# Patient Record
Sex: FEMALE | Race: WHITE | NOT HISPANIC OR LATINO | Employment: PART TIME | URBAN - METROPOLITAN AREA
[De-identification: names, ages, dates, MRNs, and addresses within clinical notes are randomized per-mention and may not be internally consistent; named-entity substitution may affect disease eponyms.]

---

## 2017-11-26 ENCOUNTER — HOSPITAL ENCOUNTER (EMERGENCY)
Facility: HOSPITAL | Age: 57
Discharge: HOME | End: 2017-11-26
Attending: EMERGENCY MEDICINE | Admitting: EMERGENCY MEDICINE
Payer: COMMERCIAL

## 2017-11-26 ENCOUNTER — APPOINTMENT (EMERGENCY)
Dept: RADIOLOGY | Facility: HOSPITAL | Age: 57
End: 2017-11-26
Payer: COMMERCIAL

## 2017-11-26 VITALS
DIASTOLIC BLOOD PRESSURE: 68 MMHG | SYSTOLIC BLOOD PRESSURE: 100 MMHG | HEIGHT: 68 IN | RESPIRATION RATE: 21 BRPM | WEIGHT: 160 LBS | TEMPERATURE: 97.7 F | OXYGEN SATURATION: 98 % | HEART RATE: 86 BPM | BODY MASS INDEX: 24.25 KG/M2

## 2017-11-26 DIAGNOSIS — I47.1 PSVT (PAROXYSMAL SUPRAVENTRICULAR TACHYCARDIA) (HCC): Primary | ICD-10-CM

## 2017-11-26 LAB
ALBUMIN SERPL BCP-MCNC: 3.3 G/DL (ref 3.5–5)
ALP SERPL-CCNC: 83 U/L (ref 46–116)
ALT SERPL W P-5'-P-CCNC: 18 U/L (ref 12–78)
ANION GAP SERPL CALCULATED.3IONS-SCNC: 14 MMOL/L (ref 4–13)
AST SERPL W P-5'-P-CCNC: 12 U/L (ref 5–45)
ATRIAL RATE: 104 BPM
ATRIAL RATE: 79 BPM
BASOPHILS # BLD AUTO: 0.08 THOUSANDS/ΜL (ref 0–0.1)
BASOPHILS NFR BLD AUTO: 1 % (ref 0–1)
BILIRUB SERPL-MCNC: 0.3 MG/DL (ref 0.2–1)
BUN SERPL-MCNC: 17 MG/DL (ref 5–25)
CALCIUM SERPL-MCNC: 9.1 MG/DL (ref 8.3–10.1)
CHLORIDE SERPL-SCNC: 101 MMOL/L (ref 100–108)
CO2 SERPL-SCNC: 24 MMOL/L (ref 21–32)
CREAT SERPL-MCNC: 1.09 MG/DL (ref 0.6–1.3)
EOSINOPHIL # BLD AUTO: 0.13 THOUSAND/ΜL (ref 0–0.61)
EOSINOPHIL NFR BLD AUTO: 1 % (ref 0–6)
ERYTHROCYTE [DISTWIDTH] IN BLOOD BY AUTOMATED COUNT: 13.2 % (ref 11.6–15.1)
GFR SERPL CREATININE-BSD FRML MDRD: 57 ML/MIN/1.73SQ M
GLUCOSE SERPL-MCNC: 149 MG/DL (ref 65–140)
HCT VFR BLD AUTO: 47.1 % (ref 34.8–46.1)
HGB BLD-MCNC: 15.6 G/DL (ref 11.5–15.4)
LYMPHOCYTES # BLD AUTO: 3.73 THOUSANDS/ΜL (ref 0.6–4.47)
LYMPHOCYTES NFR BLD AUTO: 27 % (ref 14–44)
MAGNESIUM SERPL-MCNC: 2 MG/DL (ref 1.6–2.6)
MCH RBC QN AUTO: 28.3 PG (ref 26.8–34.3)
MCHC RBC AUTO-ENTMCNC: 33.1 G/DL (ref 31.4–37.4)
MCV RBC AUTO: 85 FL (ref 82–98)
MONOCYTES # BLD AUTO: 0.75 THOUSAND/ΜL (ref 0.17–1.22)
MONOCYTES NFR BLD AUTO: 5 % (ref 4–12)
NEUTROPHILS # BLD AUTO: 9.25 THOUSANDS/ΜL (ref 1.85–7.62)
NEUTS SEG NFR BLD AUTO: 66 % (ref 43–75)
NRBC BLD AUTO-RTO: 0 /100 WBCS
P AXIS: 60 DEGREES
PHOSPHATE SERPL-MCNC: 3 MG/DL (ref 2.7–4.5)
PLATELET # BLD AUTO: 328 THOUSANDS/UL (ref 149–390)
PMV BLD AUTO: 10.1 FL (ref 8.9–12.7)
POTASSIUM SERPL-SCNC: 4.2 MMOL/L (ref 3.5–5.3)
PR INTERVAL: 144 MS
PROT SERPL-MCNC: 7.3 G/DL (ref 6.4–8.2)
QRS AXIS: 57 DEGREES
QRS AXIS: 75 DEGREES
QRSD INTERVAL: 66 MS
QRSD INTERVAL: 68 MS
QT INTERVAL: 280 MS
QT INTERVAL: 338 MS
QTC INTERVAL: 444 MS
QTC INTERVAL: 448 MS
RBC # BLD AUTO: 5.52 MILLION/UL (ref 3.81–5.12)
SODIUM SERPL-SCNC: 139 MMOL/L (ref 136–145)
T WAVE AXIS: 10 DEGREES
T WAVE AXIS: 50 DEGREES
TROPONIN I SERPL-MCNC: 0.02 NG/ML
TSH SERPL DL<=0.05 MIU/L-ACNC: 1.83 UIU/ML (ref 0.36–3.74)
VENTRICULAR RATE: 104 BPM
VENTRICULAR RATE: 154 BPM
WBC # BLD AUTO: 13.99 THOUSAND/UL (ref 4.31–10.16)

## 2017-11-26 PROCEDURE — 96374 THER/PROPH/DIAG INJ IV PUSH: CPT

## 2017-11-26 PROCEDURE — 99285 EMERGENCY DEPT VISIT HI MDM: CPT

## 2017-11-26 PROCEDURE — 80053 COMPREHEN METABOLIC PANEL: CPT | Performed by: EMERGENCY MEDICINE

## 2017-11-26 PROCEDURE — 36415 COLL VENOUS BLD VENIPUNCTURE: CPT | Performed by: EMERGENCY MEDICINE

## 2017-11-26 PROCEDURE — 84484 ASSAY OF TROPONIN QUANT: CPT | Performed by: EMERGENCY MEDICINE

## 2017-11-26 PROCEDURE — 84443 ASSAY THYROID STIM HORMONE: CPT | Performed by: EMERGENCY MEDICINE

## 2017-11-26 PROCEDURE — 71010 HB CHEST X-RAY 1 VIEW FRONTAL (PORTABLE): CPT

## 2017-11-26 PROCEDURE — 85025 COMPLETE CBC W/AUTO DIFF WBC: CPT | Performed by: EMERGENCY MEDICINE

## 2017-11-26 PROCEDURE — 84100 ASSAY OF PHOSPHORUS: CPT | Performed by: EMERGENCY MEDICINE

## 2017-11-26 PROCEDURE — 83735 ASSAY OF MAGNESIUM: CPT | Performed by: EMERGENCY MEDICINE

## 2017-11-26 PROCEDURE — 93005 ELECTROCARDIOGRAM TRACING: CPT | Performed by: EMERGENCY MEDICINE

## 2017-11-26 RX ORDER — CLONAZEPAM 1 MG/1
0.5 TABLET ORAL 4 TIMES DAILY PRN
COMMUNITY

## 2017-11-26 RX ORDER — ADENOSINE 3 MG/ML
6 INJECTION INTRAVENOUS ONCE
Status: COMPLETED | OUTPATIENT
Start: 2017-11-26 | End: 2017-11-26

## 2017-11-26 RX ORDER — ADENOSINE 3 MG/ML
INJECTION, SOLUTION INTRAVENOUS
Status: COMPLETED
Start: 2017-11-26 | End: 2017-11-26

## 2017-11-26 RX ORDER — PAROXETINE 30 MG/1
60 TABLET, FILM COATED ORAL EVERY MORNING
COMMUNITY

## 2017-11-26 RX ORDER — ADENOSINE 3 MG/ML
12 INJECTION INTRAVENOUS ONCE
Status: COMPLETED | OUTPATIENT
Start: 2017-11-26 | End: 2017-11-26

## 2017-11-26 RX ADMIN — ADENOSINE 6 MG: 3 INJECTION, SOLUTION INTRAVENOUS at 10:13

## 2017-11-26 RX ADMIN — ADENOSINE 6 MG: 3 INJECTION INTRAVENOUS at 10:13

## 2017-11-26 RX ADMIN — ADENOSINE 12 MG: 3 INJECTION, SOLUTION INTRAVENOUS at 10:15

## 2017-11-26 NOTE — ED NOTES
SVT persists after 6mg adenosine  Administered 12mg adenosine   Dr crenshaw at bedside for administration     Pepe Mcbride RN  11/26/17 1016

## 2017-11-26 NOTE — ED PROVIDER NOTES
History  Chief Complaint   Patient presents with    Chest Pain     patient is c/o chest pain and palpitations  hx of SVT  Patient is a 72-year-old female  She has a history of PSVT  She has not had an episode about 20 years  Last time she was referred for ablation  She declined  Again she has been well for about 20 years  At 8:00 a m  she developed palpitations  She tried vagal maneuvers without relief  She is also experiencing a burning chest pain and some shortness of breath  Those symptoms are typical for her when she has these episodes of PSVT  She is not dizzy or near syncopal   She did not pass out  Symptoms have been constant  There been no relieving factors  Prior to Admission Medications   Prescriptions Last Dose Informant Patient Reported? Taking? PARoxetine (PAXIL) 30 mg tablet   Yes Yes   Sig: Take 60 mg by mouth every morning   clonazePAM (KlonoPIN) 1 mg tablet   Yes Yes   Sig: Take 0 5 mg by mouth 4 (four) times a day as needed      Facility-Administered Medications: None       Past Medical History:   Diagnosis Date    SVT (supraventricular tachycardia) (HCC)        Past Surgical History:   Procedure Laterality Date    JOINT REPLACEMENT      right hip       History reviewed  No pertinent family history  I have reviewed and agree with the history as documented  Social History   Substance Use Topics    Smoking status: Current Every Day Smoker     Packs/day: 1 00     Types: Cigarettes    Smokeless tobacco: Never Used    Alcohol use No        Review of Systems   Constitutional: Negative for chills and fever  HENT: Negative for rhinorrhea and sore throat  Eyes: Negative for pain, redness and visual disturbance  Respiratory: Positive for shortness of breath  Negative for cough  Cardiovascular: Positive for chest pain and palpitations  Negative for leg swelling  Gastrointestinal: Negative for abdominal pain, diarrhea and vomiting     Endocrine: Negative for polydipsia and polyuria  Genitourinary: Negative for dysuria, frequency, hematuria, vaginal bleeding and vaginal discharge  Musculoskeletal: Negative for back pain and neck pain  Skin: Negative for rash and wound  Allergic/Immunologic: Negative for immunocompromised state  Neurological: Negative for weakness, numbness and headaches  Hematological: Does not bruise/bleed easily  Psychiatric/Behavioral: Negative for hallucinations and suicidal ideas  All other systems reviewed and are negative  Physical Exam  ED Triage Vitals [11/26/17 1005]   Temperature Pulse Respirations Blood Pressure SpO2   97 7 °F (36 5 °C) (!) 165 16 158/76 98 %      Temp Source Heart Rate Source Patient Position - Orthostatic VS BP Location FiO2 (%)   Oral Monitor Sitting Right arm --      Pain Score       8           Orthostatic Vital Signs  Vitals:    11/26/17 1005 11/26/17 1015 11/26/17 1030 11/26/17 1100   BP: 158/76  115/70 100/68   Pulse: (!) 165 (!) 152 88 86   Patient Position - Orthostatic VS: Sitting  Lying        Physical Exam   Constitutional: She is oriented to person, place, and time  She appears well-developed and well-nourished  HENT:   Head: Normocephalic and atraumatic  Mouth/Throat: Oropharynx is clear and moist    Eyes: Conjunctivae are normal  Right eye exhibits no discharge  Left eye exhibits no discharge  No scleral icterus  Neck: Normal range of motion  Neck supple  Cardiovascular: Regular rhythm, normal heart sounds and intact distal pulses  Exam reveals no gallop and no friction rub  No murmur heard  Tachycardic rate  Pulmonary/Chest: Effort normal and breath sounds normal  No stridor  No respiratory distress  She has no wheezes  She has no rales  Abdominal: Soft  Bowel sounds are normal  She exhibits no distension  There is no tenderness  There is no rebound and no guarding  Musculoskeletal: Normal range of motion  She exhibits no edema, tenderness or deformity     No CVA tenderness  No calf tenderness/palpable cords  Neurological: She is alert and oriented to person, place, and time  She has normal strength  No sensory deficit  GCS eye subscore is 4  GCS verbal subscore is 5  GCS motor subscore is 6  Skin: Skin is warm and dry  No rash noted  Psychiatric: She has a normal mood and affect  Her behavior is normal    Vitals reviewed  ED Medications  Medications   adenosine (ADENOCARD) injection 6 mg (6 mg Intravenous Given 11/26/17 1013)   adenosine (ADENOCARD) injection 12 mg (12 mg Intravenous Given 11/26/17 1015)       Diagnostic Studies  Results Reviewed     Procedure Component Value Units Date/Time    Magnesium [[id number]]  (Normal) Collected:  11/26/17 1023    Lab Status:  Final result Specimen:  Blood from Arm, Right Updated:  11/26/17 1116     Magnesium 2 0 mg/dL     Phosphorus [[id number]]  (Normal) Collected:  11/26/17 1023    Lab Status:  Final result Specimen:  Blood from Arm, Right Updated:  11/26/17 1116     Phosphorus 3 0 mg/dL     TSH [[id number]]  (Normal) Collected:  11/26/17 1023    Lab Status:  Final result Specimen:  Blood from Arm, Right Updated:  11/26/17 1116     TSH 3RD GENERATON 1 827 uIU/mL     Narrative:         Patients undergoing fluorescein dye angiography may retain small amounts of fluorescein in the body for 48-72 hours post procedure  Samples containing fluorescein can produce falsely depressed TSH values  If the patient had this procedure,a specimen should be resubmitted post fluorescein clearance            The recommended reference ranges for TSH during pregnancy are as follows:  First trimester 0 1 to 2 5 uIU/mL  Second trimester  0 2 to 3 0 uIU/mL  Third trimester 0 3 to 3 0 uIU/m      Comprehensive metabolic panel [[id number]]  (Abnormal) Collected:  11/26/17 1023    Lab Status:  Final result Specimen:  Blood from Arm, Right Updated:  11/26/17 1109     Sodium 139 mmol/L      Potassium 4 2 mmol/L      Chloride 101 mmol/L      CO2 24 mmol/L Anion Gap 14 (H) mmol/L      BUN 17 mg/dL      Creatinine 1 09 mg/dL      Glucose 149 (H) mg/dL      Calcium 9 1 mg/dL      AST 12 U/L      ALT 18 U/L      Alkaline Phosphatase 83 U/L      Total Protein 7 3 g/dL      Albumin 3 3 (L) g/dL      Total Bilirubin 0 30 mg/dL      eGFR 57 ml/min/1 73sq m     Narrative:         National Kidney Disease Education Program recommendations are as follows:  GFR calculation is accurate only with a steady state creatinine  Chronic Kidney disease less than 60 ml/min/1 73 sq  meters  Kidney failure less than 15 ml/min/1 73 sq  meters  Troponin I [[id number]]  (Normal) Collected:  11/26/17 1023    Lab Status:  Final result Specimen:  Blood from Arm, Right Updated:  11/26/17 1056     Troponin I 0 02 ng/mL     Narrative:         [employer] Chemistry analyzer 99% cutoff is > 0 04 ng/mL in network labs    o cTnI 99% cutoff is useful only when applied to patients in the clinical setting of myocardial ischemia  o cTnI 99% cutoff should be interpreted in the context of clinical history, ECG findings and possibly cardiac imaging to establish correct diagnosis  o cTnI 99% cutoff may be suggestive but clearly not indicative of a coronary event without the clinical setting of myocardial ischemia      CBC and differential [[id number]]  (Abnormal) Collected:  11/26/17 1023    Lab Status:  Final result Specimen:  Blood from Arm, Right Updated:  11/26/17 1038     WBC 13 99 (H) Thousand/uL      RBC 5 52 (H) Million/uL      Hemoglobin 15 6 (H) g/dL      Hematocrit 47 1 (H) %      MCV 85 fL      MCH 28 3 pg      MCHC 33 1 g/dL      RDW 13 2 %      MPV 10 1 fL      Platelets 588 Thousands/uL      nRBC 0 /100 WBCs      Neutrophils Relative 66 %      Lymphocytes Relative 27 %      Monocytes Relative 5 %      Eosinophils Relative 1 %      Basophils Relative 1 %      Neutrophils Absolute 9 25 (H) Thousands/µL      Lymphocytes Absolute 3 73 Thousands/µL      Monocytes Absolute 0 75 Thousand/µL      Eosinophils Absolute 0 13 Thousand/µL      Basophils Absolute 0 08 Thousands/µL                  XR chest 1 view portable   ED Interpretation by Ana Maria Roach MD (11/26 5031)   No cardiomegaly  Normal mediastinum  No infiltrates  No congestive heart failure  No pneumothorax  Procedures  ECG 12 Lead Documentation  Date/Time: 11/26/2017 10:19 AM  Performed by: Tom Molina  Authorized by: Tom Molina     ECG reviewed by me, the ED Provider: yes    Patient location:  ED  Comments:      PSVT  Nonspecific ST abnormality  Rate is 154  No ectopy  ECG 12 Lead Documentation  Date/Time: 11/26/2017 10:20 AM  Performed by: Tom Molina  Authorized by: Tom Molina     ECG reviewed by me, the ED Provider: yes    Patient location:  ED  Ectopy:     Ectopy: none    QRS:     QRS axis:  Normal  Conduction:     Conduction: normal    ST segments:     ST segments:  Normal  T waves:     T waves: normal    Comments:      Sinus tachycardia  Rate is 104 beats per minute  General Procedure  Date/Time: 11/26/2017 10:21 AM  Performed by: Tom Molina  Authorized by: Tom Molina     Patient location:  ED  Assisting Provider(s): Yes (comment)    Comments:      Chemical cardioversion:  Patient received 6 mg of adenosine rapid IV push  Patient remained in PSVT  Patient subsequently received 12 mg of adenosine rapid IV push  She converted into a sinus tachycardia at 104 beats per minute  Patient tolerated procedure well  Phone Contacts  ED Phone Contact    ED Course  ED Course                                MDM  Number of Diagnoses or Management Options  Diagnosis management comments: Patient remained in normal sinus rhythm  Chest x-ray was normal  Symptoms resolved  Troponin negative  Appropriate for discharge and outpatient management  Will refer to cardiology  White blood cell count was mildly elevated  Patient reports this is chronic  She also had mild elevations in her glucose  This can be followed up by her primary MD        Amount and/or Complexity of Data Reviewed  Clinical lab tests: ordered and reviewed  Tests in the radiology section of CPT®: ordered and reviewed  Independent visualization of images, tracings, or specimens: yes      CritCare Time    Disposition  Final diagnoses:   PSVT (paroxysmal supraventricular tachycardia) ([address] )     Time reflects when diagnosis was documented in both MDM as applicable and the Disposition within this note     Time User Action Codes Description Comment    11/26/2017 12:03 PM Dung Golden Add [I47 1] PSVT (paroxysmal supraventricular tachycardia) [facility])       ED Disposition     ED Disposition Condition Comment    Discharge  Viktor Ghosh discharge to home/self care  Condition at discharge: Good        Follow-up Information     Follow up With Specialties Details Why Samir Hamilton MD Cardiology In 1 week  [address] [facility] [address] [phone]          Patient's Medications   Discharge Prescriptions    No medications on file     No discharge procedures on file      ED Provider  Electronically Signed by           Ashanti Salinas MD  11/26/17 2200

## 2017-11-26 NOTE — ED NOTES
Pt in NSR post 12mg adenosine; VSS    Denies chest pain SOB; resolved     Jose Burgos RN  11/26/17 6673

## 2022-05-06 ENCOUNTER — OFFICE VISIT (OUTPATIENT)
Dept: URGENT CARE | Facility: CLINIC | Age: 62
End: 2022-05-06
Payer: COMMERCIAL

## 2022-05-06 ENCOUNTER — HOSPITAL ENCOUNTER (EMERGENCY)
Facility: HOSPITAL | Age: 62
Discharge: HOME | End: 2022-05-06
Attending: EMERGENCY MEDICINE | Admitting: EMERGENCY MEDICINE
Payer: COMMERCIAL

## 2022-05-06 VITALS
BODY MASS INDEX: 25.76 KG/M2 | OXYGEN SATURATION: 98 % | DIASTOLIC BLOOD PRESSURE: 70 MMHG | TEMPERATURE: 98.6 F | WEIGHT: 170 LBS | RESPIRATION RATE: 20 BRPM | HEIGHT: 68 IN | HEART RATE: 156 BPM | SYSTOLIC BLOOD PRESSURE: 160 MMHG

## 2022-05-06 VITALS
RESPIRATION RATE: 18 BRPM | DIASTOLIC BLOOD PRESSURE: 55 MMHG | TEMPERATURE: 99.9 F | SYSTOLIC BLOOD PRESSURE: 93 MMHG | HEART RATE: 77 BPM | OXYGEN SATURATION: 95 %

## 2022-05-06 DIAGNOSIS — R06.02 SHORTNESS OF BREATH: ICD-10-CM

## 2022-05-06 DIAGNOSIS — R00.0 TACHYCARDIA: Primary | ICD-10-CM

## 2022-05-06 DIAGNOSIS — I47.1 SVT (SUPRAVENTRICULAR TACHYCARDIA) (HCC): Primary | ICD-10-CM

## 2022-05-06 DIAGNOSIS — I95.9 HYPOTENSION: ICD-10-CM

## 2022-05-06 LAB
ATRIAL RATE: 153 BPM
ATRIAL RATE: 375 BPM
QRS AXIS: 77 DEGREES
QRS AXIS: 78 DEGREES
QRSD INTERVAL: 66 MS
QRSD INTERVAL: 68 MS
QT INTERVAL: 266 MS
QT INTERVAL: 282 MS
QTC INTERVAL: 420 MS
QTC INTERVAL: 445 MS
T WAVE AXIS: -30 DEGREES
T WAVE AXIS: -74 DEGREES
VENTRICULAR RATE: 150 BPM
VENTRICULAR RATE: 150 BPM

## 2022-05-06 PROCEDURE — 93010 ELECTROCARDIOGRAM REPORT: CPT | Performed by: INTERNAL MEDICINE

## 2022-05-06 PROCEDURE — 96360 HYDRATION IV INFUSION INIT: CPT

## 2022-05-06 PROCEDURE — 93005 ELECTROCARDIOGRAM TRACING: CPT

## 2022-05-06 PROCEDURE — G0382 LEV 3 HOSP TYPE B ED VISIT: HCPCS

## 2022-05-06 PROCEDURE — 99284 EMERGENCY DEPT VISIT MOD MDM: CPT | Performed by: EMERGENCY MEDICINE

## 2022-05-06 PROCEDURE — 99285 EMERGENCY DEPT VISIT HI MDM: CPT

## 2022-05-06 RX ORDER — ADENOSINE 3 MG/ML
12 INJECTION INTRAVENOUS ONCE
Status: DISCONTINUED | OUTPATIENT
Start: 2022-05-06 | End: 2022-05-06 | Stop reason: HOSPADM

## 2022-05-06 RX ADMIN — SODIUM CHLORIDE 1000 ML: 0.9 INJECTION, SOLUTION INTRAVENOUS at 15:19

## 2022-05-06 NOTE — ED NOTES
Pt's heart rate 83 when brought back to room  Provider made aware and new EKG printed        Nilda Rudolph RN  05/06/22 0084

## 2022-05-06 NOTE — PROGRESS NOTES
[facility]        NAME: Henrry Chin is a 65 y o  female  : 1960    MRN: [id number]  DATE: May 6, 2022  TIME: 2:19 PM    Assessment and Plan   Tachycardia [R00 0]  1  Tachycardia  ECG 12 lead    Transfer to other facility   2  Shortness of breath       EKG obtained - motion degraded  Regular rhythm, appears SVT but a fib not ruled out due to limited P-wave presence noted with motion degration  Sent to ER for further work-up given abnormal EKG and symptomatic nature  Offered ambulance, pt and  declined  They prefer to drive over to hospital,  reports that he will drive  Patient Instructions     Proceed to ER  Chief Complaint     Chief Complaint   Patient presents with    Shortness of Breath     with  racing heart started last night          History of Present Illness       Presents with shortness of breath and heart racing that started last night  Previous history of SVT - no episode for years  Other past medical history includes anxiety  She feels she is always anxious/panic status unchanged from baseline  No shortness of breath at rest but feels with any movement  Waited to seek a doctor since her SVT episodes often went away own their own  Associated symptoms include mild nausea, no vomiting and slight lightheadedness  Review of Systems   Review of Systems   Constitutional: Negative for chills, fatigue and fever  HENT: Negative for congestion  Respiratory: Positive for shortness of breath  Negative for cough  Cardiovascular: Positive for palpitations  Negative for chest pain  Feels heart racing   Gastrointestinal: Positive for nausea (mild)  Musculoskeletal: Negative for myalgias  Skin: Negative for rash  Neurological: Positive for light-headedness  Negative for syncope  Psychiatric/Behavioral: Negative for confusion  The patient is nervous/anxious (chronic)            Current Medications       Current Outpatient Medications:    clonazePAM (KlonoPIN) 1 mg tablet, Take 0 5 mg by mouth 4 (four) times a day as needed, Disp: , Rfl:     CLONIDINE-CHLORTHALIDONE PO, Take by mouth, Disp: , Rfl:     PARoxetine (PAXIL) 30 mg tablet, Take 60 mg by mouth every morning, Disp: , Rfl:     QUEtiapine Fumarate (SEROQUEL PO), Take by mouth, Disp: , Rfl:     Sertraline HCl (ZOLOFT PO), Take by mouth, Disp: , Rfl:     Current Allergies     Allergies as of 05/06/2022    (No Known Allergies)            The following portions of the patient's history were reviewed and updated as appropriate: allergies, current medications, past family history, past medical history, past social history, past surgical history and problem list      Past Medical History:   Diagnosis Date    Anxiety     SVT (supraventricular tachycardia) ([facility] [address] )        Past Surgical History:   Procedure Laterality Date    JOINT REPLACEMENT      right hip       History reviewed  No pertinent family history  Medications have been verified  Objective   /70   Pulse (!) 156   Temp 98 6 °F (37 °C)   Resp 20   Ht 5' 8" (1 727 m)   Wt 77 1 kg (170 lb)   SpO2 98%   BMI 25 85 kg/m²        Physical Exam     Physical Exam  Vitals reviewed  Constitutional:       Appearance: Normal appearance  Cardiovascular:      Rate and Rhythm: Regular rhythm  Tachycardia present  Pulses: Normal pulses  Heart sounds: Normal heart sounds  No murmur heard  Pulmonary:      Effort: Pulmonary effort is normal  No respiratory distress  Breath sounds: Normal breath sounds  No decreased breath sounds  Chest:      Chest wall: No tenderness  Musculoskeletal:         General: Normal range of motion  Skin:     General: Skin is warm and dry  Capillary Refill: Capillary refill takes less than 2 seconds  Neurological:      General: No focal deficit present  Mental Status: She is alert and oriented to person, place, and time     Psychiatric:         Mood and Affect: Mood normal          Behavior: Behavior normal

## 2022-05-06 NOTE — ED PROVIDER NOTES
History  Chief Complaint   Patient presents with    Rapid Heart Rate     pt states she was just seen at urgent care for palpitations and was sent here  66 yo female with h/o recurrent SVT who presents to ED c/o tachycardia since last night  Associated dyspnea similar to prior SVT  No leg pain or swelling  No cough  No CP  Attempted to perform vagal maneuvers without relief  Sent to ED by  due to SVT  States prior episodes were resolved with adenosine  Prior to Admission Medications   Prescriptions Last Dose Informant Patient Reported? Taking? CLONIDINE-CHLORTHALIDONE PO   Yes No   Sig: Take by mouth   PARoxetine (PAXIL) 30 mg tablet   Yes No   Sig: Take 60 mg by mouth every morning   QUEtiapine Fumarate (SEROQUEL PO)   Yes No   Sig: Take by mouth   Sertraline HCl (ZOLOFT PO)   Yes No   Sig: Take by mouth   clonazePAM (KlonoPIN) 1 mg tablet   Yes No   Sig: Take 0 5 mg by mouth 4 (four) times a day as needed      Facility-Administered Medications: None       Past Medical History:   Diagnosis Date    Anxiety     SVT (supraventricular tachycardia) (HCC)        Past Surgical History:   Procedure Laterality Date    JOINT REPLACEMENT      right hip       History reviewed  No pertinent family history  I have reviewed and agree with the history as documented  E-Cigarette/Vaping     E-Cigarette/Vaping Substances     Social History     Tobacco Use    Smoking status: Current Every Day Smoker     Packs/day: 1 00     Types: Cigarettes    Smokeless tobacco: Never Used   Substance Use Topics    Alcohol use: No    Drug use: No       Review of Systems   Respiratory: Positive for shortness of breath  Cardiovascular: Positive for palpitations  All other systems reviewed and are negative  Physical Exam  Physical Exam  Vitals and nursing note reviewed  Constitutional:       General: She is not in acute distress  Appearance: Normal appearance  She is well-developed   She is not ill-appearing, toxic-appearing or diaphoretic  HENT:      Head: Normocephalic and atraumatic  Eyes:      Conjunctiva/sclera: Conjunctivae normal       Pupils: Pupils are equal, round, and reactive to light  Neck:      Vascular: No JVD  Cardiovascular:      Rate and Rhythm: Regular rhythm  Tachycardia present  Pulses: Normal pulses  Heart sounds: Normal heart sounds  Pulmonary:      Effort: Pulmonary effort is normal       Breath sounds: Normal breath sounds  No stridor  Abdominal:      General: There is no distension  Palpations: Abdomen is soft  Tenderness: There is no abdominal tenderness  There is no guarding or rebound  Musculoskeletal:         General: No tenderness or deformity  Normal range of motion  Cervical back: Normal range of motion and neck supple  Skin:     General: Skin is warm and dry  Capillary Refill: Capillary refill takes less than 2 seconds  Coloration: Skin is not jaundiced or pale  Findings: No bruising, erythema, lesion or rash  Neurological:      General: No focal deficit present  Mental Status: She is alert and oriented to person, place, and time  Cranial Nerves: No cranial nerve deficit  Sensory: No sensory deficit  Motor: No abnormal muscle tone        Coordination: Coordination normal       Gait: Gait normal          Vital Signs  ED Triage Vitals [05/06/22 1444]   Temperature Pulse Respirations Blood Pressure SpO2   99 9 °F (37 7 °C) (!) 157 20 116/58 99 %      Temp Source Heart Rate Source Patient Position - Orthostatic VS BP Location FiO2 (%)   Tympanic Monitor Sitting Left arm --      Pain Score       --           Vitals:    05/06/22 1444 05/06/22 1500 05/06/22 1515 05/06/22 1530   BP: 116/58  (!) 85/55 93/55   Pulse: (!) 157 83 79 77   Patient Position - Orthostatic VS: Sitting            Visual Acuity      ED Medications  Medications   sodium chloride 0 9 % bolus 1,000 mL (0 mL Intravenous Stopped 5/6/22 1615) Diagnostic Studies  Results Reviewed     None                 No orders to display              Procedures  Procedures         ED Course  ED Course as of 05/06/22 2003   Fri May 06, 2022   1511 Svt resolved PRIOR to administration of adenosine  Pt notes resolution of sxs  Will observe in ED on cardiac monitor  Anticipate d/c home, will refer to EP and start on beta blocker  [address]  Mildly hypotensive but states she feels well, appears well  Iv fluids hanging  1604 Again reexamined  Continues to feel well  Comfortable w plan to d/c home  SBIRT 22yo+      Most Recent Value   SBIRT (24 yo +)    In order to provide better care to our patients, we are screening all of our patients for alcohol and drug use  Would it be okay to ask you these screening questions? No Filed at: 05/06/2022 1522                    MDM    Disposition  Final diagnoses:   SVT (supraventricular tachycardia) (HCC)   Hypotension     Time reflects when diagnosis was documented in both MDM as applicable and the Disposition within this note     Time User Action Codes Description Comment    5/6/2022  4:06 PM Pedrito [place] Add [I47 1] SVT (supraventricular tachycardia) ([address] )     5/6/2022  4:06 PM Pedrito [place] Add [I95 9] Hypotension       ED Disposition     ED Disposition Condition Date/Time Comment    Discharge Stable Fri May 6, 2022  4:06 PM Gordon Davila discharge to home/self care              Follow-up Information     Follow up With Specialties Details Why Contact Info Additional Information    Dandy Villavicencio, DO Cardiology In 1 week for further evaluation of your SVT [facility]  Emergency Department Emergency Medicine  If symptoms worsen [address] Emergency Department, [address] [place] [address], [id number] Discharge Medication List as of 5/6/2022  4:07 PM      CONTINUE these medications which have NOT CHANGED    Details   clonazePAM (KlonoPIN) 1 mg tablet Take 0 5 mg by mouth 4 (four) times a day as needed, Historical Med      CLONIDINE-CHLORTHALIDONE PO Take by mouth, Historical Med      PARoxetine (PAXIL) 30 mg tablet Take 60 mg by mouth every morning, Historical Med      QUEtiapine Fumarate (SEROQUEL PO) Take by mouth, Historical Med      Sertraline HCl (ZOLOFT PO) Take by mouth, Historical Med             No discharge procedures on file      PDMP Review     None          ED Provider  Electronically Signed by           Mark Hobson MD  05/06/22 2003

## 2022-05-08 LAB
ATRIAL RATE: 83 BPM
P AXIS: 43 DEGREES
PR INTERVAL: 116 MS
QRS AXIS: 65 DEGREES
QRSD INTERVAL: 64 MS
QT INTERVAL: 350 MS
QTC INTERVAL: 411 MS
T WAVE AXIS: 31 DEGREES
VENTRICULAR RATE: 83 BPM

## 2022-05-08 PROCEDURE — 93010 ELECTROCARDIOGRAM REPORT: CPT | Performed by: INTERNAL MEDICINE

## 2022-05-10 LAB
ATRIAL RATE: 146 BPM
QRS AXIS: 57 DEGREES
QRSD INTERVAL: 118 MS
QT INTERVAL: 290 MS
QTC INTERVAL: 451 MS
T WAVE AXIS: -24 DEGREES
VENTRICULAR RATE: 146 BPM

## 2022-05-10 PROCEDURE — 93010 ELECTROCARDIOGRAM REPORT: CPT | Performed by: INTERNAL MEDICINE

## 2022-07-25 ENCOUNTER — OFFICE VISIT (OUTPATIENT)
Dept: URGENT CARE | Facility: CLINIC | Age: 62
End: 2022-07-25
Payer: COMMERCIAL

## 2022-07-25 VITALS
RESPIRATION RATE: 18 BRPM | SYSTOLIC BLOOD PRESSURE: 110 MMHG | TEMPERATURE: 98 F | HEART RATE: 76 BPM | OXYGEN SATURATION: 98 % | DIASTOLIC BLOOD PRESSURE: 60 MMHG

## 2022-07-25 DIAGNOSIS — B34.9 VIRAL INFECTION: Primary | ICD-10-CM

## 2022-07-25 PROCEDURE — G0382 LEV 3 HOSP TYPE B ED VISIT: HCPCS | Performed by: PHYSICIAN ASSISTANT

## 2022-07-25 NOTE — PROGRESS NOTES
[facility] Now        NAME: Daniel Freitas is a 65 y o  female  : 1960    MRN: [id number]  DATE: 2022  TIME: 7:58 AM    Assessment and Plan   Viral infection [B34 9]  1  Viral infection           Patient Instructions   There are no Patient Instructions on file for this visit  Follow up with PCP in 3-5 days  Proceed to  ER if symptoms worsen  Chief Complaint     Chief Complaint   Patient presents with    Influenza     Patient states about last Monday with symtoms    Sore Throat    Headache    Cough    Fatigue         History of Present Illness       Patient presents with sore throat, headache, cough, fatigue, congestion for the past week  Had 2 episodes of vomiting earlier and a few episodes of diarrhea  Denies any sudden changes to symptoms, new symptoms, fevers, chest pain, shortness of breath, difficulty breathing, muscle aches, body aches  She has had sick contacts with similar symptoms around her  Review of Systems   Review of Systems   Constitutional: Positive for fatigue  Negative for chills and fever  HENT: Positive for congestion, rhinorrhea and sore throat  Negative for ear discharge, ear pain, postnasal drip, sinus pressure and sinus pain  Respiratory: Positive for cough  Negative for chest tightness, shortness of breath and wheezing  Cardiovascular: Negative for chest pain and palpitations  Gastrointestinal: Positive for diarrhea, nausea and vomiting  Negative for blood in stool  Musculoskeletal: Negative for arthralgias and myalgias  Neurological: Positive for headaches  Negative for weakness  Psychiatric/Behavioral: Negative for confusion           Current Medications       Current Outpatient Medications:     clonazePAM (KlonoPIN) 1 mg tablet, Take 0 5 mg by mouth 4 (four) times a day as needed, Disp: , Rfl:     CLONIDINE-CHLORTHALIDONE PO, Take by mouth, Disp: , Rfl:     PARoxetine (PAXIL) 30 mg tablet, Take 60 mg by mouth every morning (Patient not taking: Reported on 7/25/2022), Disp: , Rfl:     QUEtiapine Fumarate (SEROQUEL PO), Take by mouth, Disp: , Rfl:     Sertraline HCl (ZOLOFT PO), Take by mouth, Disp: , Rfl:     Current Allergies     Allergies as of 07/25/2022    (No Known Allergies)            The following portions of the patient's history were reviewed and updated as appropriate: allergies, current medications, past family history, past medical history, past social history, past surgical history and problem list      Past Medical History:   Diagnosis Date    Anxiety     SVT (supraventricular tachycardia) ([facility] [address] )        Past Surgical History:   Procedure Laterality Date    JOINT REPLACEMENT      right hip       History reviewed  No pertinent family history  Medications have been verified  Objective   /60   Pulse 76   Temp 98 °F (36 7 °C)   Resp 18   SpO2 98%        Physical Exam     Physical Exam  Constitutional:       General: She is not in acute distress  Appearance: Normal appearance  She is not ill-appearing or diaphoretic  HENT:      Right Ear: Tympanic membrane, ear canal and external ear normal       Left Ear: Tympanic membrane, ear canal and external ear normal       Nose: Nose normal       Mouth/Throat:      Mouth: Mucous membranes are moist       Pharynx: Oropharynx is clear  Eyes:      Conjunctiva/sclera: Conjunctivae normal    Cardiovascular:      Rate and Rhythm: Normal rate and regular rhythm  Heart sounds: Normal heart sounds  Pulmonary:      Effort: Pulmonary effort is normal       Breath sounds: Normal breath sounds  Abdominal:      General: Abdomen is flat  Bowel sounds are normal       Palpations: Abdomen is soft  Tenderness: There is no abdominal tenderness  There is no guarding or rebound  Skin:     General: Skin is warm and dry  Neurological:      Mental Status: She is alert     Psychiatric:         Mood and Affect: Mood normal          Behavior: Behavior normal

## 2022-07-25 NOTE — LETTER
July 25, 2022     Patient: Kurt De La Rosa  YOB: 1960  Date of Visit: 7/25/2022      To Whom it May Concern:    Kurt De La Rosa is under my professional care  Lali Heller was seen in my office on 7/25/2022  Heathelena Heller may return to work on 07/26/2022  If you have any questions or concerns, please don't hesitate to call           Sincerely,          BREN Isaacs        CC: No Recipients

## 2022-10-30 ENCOUNTER — HOSPITAL ENCOUNTER (EMERGENCY)
Facility: HOSPITAL | Age: 62
Discharge: HOME | End: 2022-10-30
Attending: EMERGENCY MEDICINE

## 2022-10-30 ENCOUNTER — APPOINTMENT (EMERGENCY)
Dept: RADIOLOGY | Facility: HOSPITAL | Age: 62
End: 2022-10-30

## 2022-10-30 VITALS
DIASTOLIC BLOOD PRESSURE: 60 MMHG | OXYGEN SATURATION: 96 % | TEMPERATURE: 97.9 F | HEART RATE: 85 BPM | SYSTOLIC BLOOD PRESSURE: 91 MMHG | RESPIRATION RATE: 20 BRPM

## 2022-10-30 DIAGNOSIS — I47.1 SVT (SUPRAVENTRICULAR TACHYCARDIA) (HCC): Primary | ICD-10-CM

## 2022-10-30 LAB
ALBUMIN SERPL BCP-MCNC: 3.2 G/DL (ref 3.5–5)
ALP SERPL-CCNC: 106 U/L (ref 46–116)
ALT SERPL W P-5'-P-CCNC: 19 U/L (ref 12–78)
ANION GAP SERPL CALCULATED.3IONS-SCNC: 11 MMOL/L (ref 4–13)
AST SERPL W P-5'-P-CCNC: 21 U/L (ref 5–45)
ATRIAL RATE: 112 BPM
ATRIAL RATE: 32 BPM
ATRIAL RATE: 91 BPM
BASOPHILS # BLD AUTO: 0.06 THOUSANDS/ÂΜL (ref 0–0.1)
BASOPHILS NFR BLD AUTO: 0 % (ref 0–1)
BILIRUB SERPL-MCNC: 0.43 MG/DL (ref 0.2–1)
BUN SERPL-MCNC: 10 MG/DL (ref 5–25)
CALCIUM ALBUM COR SERPL-MCNC: 9.7 MG/DL (ref 8.3–10.1)
CALCIUM SERPL-MCNC: 9.1 MG/DL (ref 8.3–10.1)
CHLORIDE SERPL-SCNC: 104 MMOL/L (ref 96–108)
CO2 SERPL-SCNC: 26 MMOL/L (ref 21–32)
CREAT SERPL-MCNC: 0.95 MG/DL (ref 0.6–1.3)
EOSINOPHIL # BLD AUTO: 0.08 THOUSAND/ÂΜL (ref 0–0.61)
EOSINOPHIL NFR BLD AUTO: 1 % (ref 0–6)
ERYTHROCYTE [DISTWIDTH] IN BLOOD BY AUTOMATED COUNT: 13.9 % (ref 11.6–15.1)
GFR SERPL CREATININE-BSD FRML MDRD: 64 ML/MIN/1.73SQ M
GLUCOSE SERPL-MCNC: 152 MG/DL (ref 65–140)
HCT VFR BLD AUTO: 43.1 % (ref 34.8–46.1)
HGB BLD-MCNC: 14.1 G/DL (ref 11.5–15.4)
IMM GRANULOCYTES # BLD AUTO: 0.08 THOUSAND/UL (ref 0–0.2)
IMM GRANULOCYTES NFR BLD AUTO: 1 % (ref 0–2)
LYMPHOCYTES # BLD AUTO: 3.02 THOUSANDS/ÂΜL (ref 0.6–4.47)
LYMPHOCYTES NFR BLD AUTO: 19 % (ref 14–44)
MCH RBC QN AUTO: 29.5 PG (ref 26.8–34.3)
MCHC RBC AUTO-ENTMCNC: 32.7 G/DL (ref 31.4–37.4)
MCV RBC AUTO: 90 FL (ref 82–98)
MONOCYTES # BLD AUTO: 1.01 THOUSAND/ÂΜL (ref 0.17–1.22)
MONOCYTES NFR BLD AUTO: 6 % (ref 4–12)
NEUTROPHILS # BLD AUTO: 11.89 THOUSANDS/ÂΜL (ref 1.85–7.62)
NEUTS SEG NFR BLD AUTO: 73 % (ref 43–75)
NRBC BLD AUTO-RTO: 0 /100 WBCS
P AXIS: 41 DEGREES
P AXIS: 68 DEGREES
PLATELET # BLD AUTO: 313 THOUSANDS/UL (ref 149–390)
PMV BLD AUTO: 10.4 FL (ref 8.9–12.7)
POTASSIUM SERPL-SCNC: 4.1 MMOL/L (ref 3.5–5.3)
PR INTERVAL: 136 MS
PR INTERVAL: 140 MS
PROT SERPL-MCNC: 7.6 G/DL (ref 6.4–8.4)
QRS AXIS: 49 DEGREES
QRS AXIS: 63 DEGREES
QRS AXIS: 69 DEGREES
QRSD INTERVAL: 66 MS
QRSD INTERVAL: 68 MS
QRSD INTERVAL: 72 MS
QT INTERVAL: 266 MS
QT INTERVAL: 312 MS
QT INTERVAL: 352 MS
QTC INTERVAL: 425 MS
QTC INTERVAL: 432 MS
QTC INTERVAL: 455 MS
RBC # BLD AUTO: 4.78 MILLION/UL (ref 3.81–5.12)
SODIUM SERPL-SCNC: 141 MMOL/L (ref 135–147)
T WAVE AXIS: -21 DEGREES
T WAVE AXIS: 12 DEGREES
T WAVE AXIS: 21 DEGREES
TSH SERPL DL<=0.05 MIU/L-ACNC: 1.65 UIU/ML (ref 0.45–4.5)
VENTRICULAR RATE: 112 BPM
VENTRICULAR RATE: 176 BPM
VENTRICULAR RATE: 91 BPM
WBC # BLD AUTO: 16.14 THOUSAND/UL (ref 4.31–10.16)

## 2022-10-30 RX ORDER — ADENOSINE 3 MG/ML
INJECTION, SOLUTION INTRAVENOUS
Status: COMPLETED
Start: 2022-10-30 | End: 2022-10-30

## 2022-10-30 RX ORDER — ADENOSINE 3 MG/ML
6 INJECTION INTRAVENOUS ONCE
Status: COMPLETED | OUTPATIENT
Start: 2022-10-30 | End: 2022-10-30

## 2022-10-30 RX ADMIN — ADENOSINE 6 MG: 3 INJECTION INTRAVENOUS at 09:52

## 2022-10-30 RX ADMIN — SODIUM CHLORIDE 1000 ML: 0.9 INJECTION, SOLUTION INTRAVENOUS at 10:00

## 2022-10-30 NOTE — ED PROVIDER NOTES
History  Chief Complaint   Patient presents with   • Palpitations     Pt reports a hx of SVT, reports a rapid heart rate that started at 8am  Also reports SOB     62yo female with a history of anxiety and recurrent SVT presenting for evaluation of palpitations x 2 hours  She reports feeling like her heart is racing with associated shortness of breath with exertion  Symptoms are identical to her previous SVT episodes  Patient tried vagal maneuvers at home without improvement  She has been having SVT episodes for 30 years  She was seen by cardiology many years ago and ablation was recommended but she declined at that time  She states her episodes are now becoming more frequent  Previously, she had 1 episode per year but now symptoms are occurring every 4-5 months  She started with mild URI symptoms earlier this week which are now improving  History provided by:  Patient and medical records   used: No    Palpitations  Palpitations quality:  Regular  Onset quality:  Sudden  Duration:  2 hours  Timing:  Constant  Progression:  Unchanged  Chronicity:  New  Relieved by:  Nothing  Worsened by:  Nothing  Ineffective treatments:  Valsalva  Associated symptoms: shortness of breath    Associated symptoms: no chest pain, no diaphoresis, no dizziness, no lower extremity edema, no nausea, no syncope, no vomiting and no weakness        Prior to Admission Medications   Prescriptions Last Dose Informant Patient Reported? Taking?    CLONIDINE-CHLORTHALIDONE PO   Yes No   Sig: Take by mouth   PARoxetine (PAXIL) 30 mg tablet   Yes No   Sig: Take 60 mg by mouth every morning   Patient not taking: Reported on 7/25/2022   QUEtiapine Fumarate (SEROQUEL PO)   Yes No   Sig: Take by mouth   Sertraline HCl (ZOLOFT PO)   Yes No   Sig: Take by mouth   clonazePAM (KlonoPIN) 1 mg tablet   Yes No   Sig: Take 0 5 mg by mouth 4 (four) times a day as needed      Facility-Administered Medications: None       Past Medical History:   Diagnosis Date   • Anxiety    • SVT (supraventricular tachycardia) (HCC)        Past Surgical History:   Procedure Laterality Date   • JOINT REPLACEMENT      right hip       No family history on file  I have reviewed and agree with the history as documented  E-Cigarette/Vaping     E-Cigarette/Vaping Substances     Social History     Tobacco Use   • Smoking status: Current Every Day Smoker     Packs/day: 1 00     Types: Cigarettes   • Smokeless tobacco: Never Used   Substance Use Topics   • Alcohol use: No   • Drug use: No       Review of Systems   Constitutional: Negative for chills, diaphoresis and fever  HENT: Negative for drooling and voice change  Eyes: Negative for discharge and redness  Respiratory: Positive for shortness of breath  Negative for stridor  Cardiovascular: Positive for palpitations  Negative for chest pain, leg swelling and syncope  Gastrointestinal: Negative for nausea and vomiting  Musculoskeletal: Negative for neck pain and neck stiffness  Skin: Negative for color change and rash  Neurological: Negative for dizziness, seizures, syncope and weakness  Psychiatric/Behavioral: Negative for confusion  The patient is not nervous/anxious  All other systems reviewed and are negative  Physical Exam  Physical Exam  Vitals and nursing note reviewed  Constitutional:       General: She is in acute distress  Appearance: She is well-developed  She is not diaphoretic  HENT:      Head: Normocephalic and atraumatic  Right Ear: External ear normal       Left Ear: External ear normal       Nose: Nose normal    Eyes:      General: No scleral icterus  Right eye: No discharge  Left eye: No discharge  Conjunctiva/sclera: Conjunctivae normal    Cardiovascular:      Rate and Rhythm: Regular rhythm  Tachycardia present  Heart sounds: Normal heart sounds  No murmur heard    Pulmonary:      Effort: Pulmonary effort is normal  No respiratory distress  Breath sounds: Normal breath sounds  No stridor  No wheezing or rales  Musculoskeletal:         General: No deformity  Normal range of motion  Cervical back: Normal range of motion and neck supple  Right lower leg: No edema  Left lower leg: No edema  Lymphadenopathy:      Cervical: No cervical adenopathy  Skin:     General: Skin is warm and dry  Neurological:      Mental Status: She is alert  She is not disoriented  GCS: GCS eye subscore is 4  GCS verbal subscore is 5  GCS motor subscore is 6  Psychiatric:         Behavior: Behavior normal          Vital Signs  ED Triage Vitals   Temperature Pulse Respirations Blood Pressure SpO2   10/30/22 1103 10/30/22 0943 10/30/22 0943 10/30/22 0943 10/30/22 0943   97 9 °F (36 6 °C) (!) 175 16 105/71 98 %      Temp Source Heart Rate Source Patient Position - Orthostatic VS BP Location FiO2 (%)   10/30/22 1103 10/30/22 0943 10/30/22 0943 10/30/22 0943 --   Oral Monitor Lying Right arm       Pain Score       --                  Vitals:    10/30/22 0943 10/30/22 1000 10/30/22 1045   BP: 105/71 101/61 91/60   Pulse: (!) 175 92 85   Patient Position - Orthostatic VS: Lying Sitting          Visual Acuity      ED Medications  Medications   sodium chloride 0 9 % bolus 1,000 mL (0 mL Intravenous Stopped 10/30/22 1100)   adenosine (ADENOCARD) injection 6 mg (6 mg Intravenous Given 10/30/22 5820)       Diagnostic Studies  Results Reviewed     Procedure Component Value Units Date/Time    TSH, 3rd generation with Free T4 reflex [[id number]]  (Normal) Collected: 10/30/22 1002    Lab Status: Final result Specimen: Blood from Arm, Right Updated: 10/30/22 1041     TSH 3RD GENERATON 1 653 uIU/mL     Narrative:      Patients undergoing fluorescein dye angiography may retain small amounts of fluorescein in the body for 48-72 hours post procedure  Samples containing fluorescein can produce falsely depressed TSH values   If the patient had this procedure,a specimen should be resubmitted post fluorescein clearance        Comprehensive metabolic panel [[id number]]  (Abnormal) Collected: 10/30/22 1002    Lab Status: Final result Specimen: Blood from Arm, Right Updated: 10/30/22 1032     Sodium 141 mmol/L      Potassium 4 1 mmol/L      Chloride 104 mmol/L      CO2 26 mmol/L      ANION GAP 11 mmol/L      BUN 10 mg/dL      Creatinine 0 95 mg/dL      Glucose 152 mg/dL      Calcium 9 1 mg/dL      Corrected Calcium 9 7 mg/dL      AST 21 U/L      ALT 19 U/L      Alkaline Phosphatase 106 U/L      Total Protein 7 6 g/dL      Albumin 3 2 g/dL      Total Bilirubin 0 43 mg/dL      eGFR 64 ml/min/1 73sq m     Narrative:      National Kidney Disease Foundation guidelines for Chronic Kidney Disease (CKD):   •  Stage 1 with normal or high GFR (GFR > 90 mL/min/1 73 square meters)  •  Stage 2 Mild CKD (GFR = 60-89 mL/min/1 73 square meters)  •  Stage 3A Moderate CKD (GFR = 45-59 mL/min/1 73 square meters)  •  Stage 3B Moderate CKD (GFR = 30-44 mL/min/1 73 square meters)  •  Stage 4 Severe CKD (GFR = 15-29 mL/min/1 73 square meters)  •  Stage 5 End Stage CKD (GFR <15 mL/min/1 73 square meters)  Note: GFR calculation is accurate only with a steady state creatinine    CBC and differential [[id number]]  (Abnormal) Collected: 10/30/22 1002    Lab Status: Final result Specimen: Blood from Arm, Right Updated: 10/30/22 1010     WBC 16 14 Thousand/uL      RBC 4 78 Million/uL      Hemoglobin 14 1 g/dL      Hematocrit 43 1 %      MCV 90 fL      MCH 29 5 pg      MCHC 32 7 g/dL      RDW 13 9 %      MPV 10 4 fL      Platelets 692 Thousands/uL      nRBC 0 /100 WBCs      Neutrophils Relative 73 %      Immat GRANS % 1 %      Lymphocytes Relative 19 %      Monocytes Relative 6 %      Eosinophils Relative 1 %      Basophils Relative 0 %      Neutrophils Absolute 11 89 Thousands/µL      Immature Grans Absolute 0 08 Thousand/uL      Lymphocytes Absolute 3 02 Thousands/µL      Monocytes Absolute 1 01 Thousand/µL Eosinophils Absolute 0 08 Thousand/µL      Basophils Absolute 0 06 Thousands/µL                  XR chest 1 view portable   ED Interpretation by Aylin Jane PA-C (10/30 7173)   No acute abnormality      Final Result by Rachelle Carroll MD (10/30 1132)      No acute cardiopulmonary disease  Findings concur with the preliminary report by the referring clinician already in PACS and/or our electronic record EPIC              Workstation performed: [id number]                    Procedures  ECG 12 Lead Documentation Only    Date/Time: 10/30/2022 3:40 PM  Performed by: Aylin Jane PA-C  Authorized by: Aylin Jane PA-C     Indications / Diagnosis:  Palpitations  ECG reviewed by me, the ED Provider: yes    Patient location:  ED  Rate:     ECG rate:  176    ECG rate assessment: tachycardic    Rhythm:     Rhythm: SVT    Ectopy:     Ectopy: none    QRS:     QRS axis:  Normal  Conduction:     Conduction: normal    ST segments:     ST segments:  Non-specific  T waves:     T waves: non-specific    ECG 12 Lead Documentation Only    Date/Time: 10/30/2022 3:40 PM  Performed by: Aylin Jane PA-C  Authorized by: Aylin Jane PA-C     Indications / Diagnosis:  S/p adenosine  ECG reviewed by me, the ED Provider: yes    Patient location:  ED  Rate:     ECG rate:  91    ECG rate assessment: normal    Rhythm:     Rhythm: sinus rhythm    Ectopy:     Ectopy: none    QRS:     QRS axis:  Normal  Conduction:     Conduction: normal    ST segments:     ST segments:  Normal  T waves:     T waves: inverted      Inverted:  III  CriticalCare Time  Performed by: Aylin Jane PA-C  Authorized by: Aylin Jane PA-C     Critical care provider statement:     Critical care time (minutes):  35    Critical care was necessary to treat or prevent imminent or life-threatening deterioration of the following conditions:  Cardiac failure    Critical care was time spent personally by me on the following activities:  Blood draw for specimens, ordering and review of laboratory studies, obtaining history from patient or surrogate, ordering and performing treatments and interventions, development of treatment plan with patient or surrogate, ordering and review of radiographic studies, re-evaluation of patient's condition, review of old charts, examination of patient and evaluation of patient's response to treatment             ED Course  ED Course as of 10/30/22 1809   Sun Oct 30, 2022   1053 Patient remains asymptomatic  She is stable for discharge  MDM  Number of Diagnoses or Management Options  SVT (supraventricular tachycardia) [facility]): new and requires workup  Diagnosis management comments: 64yo female presenting for palpitations x 2 hours  Hx of recurrent SVT  Tried vagal maneuvers without improvement  She is tachycardic to 175 on arrival  EKG confirms SVT  Blood pressure stable  Initial ED plan: Vagal maneuvers were attempted without improvement  Patient ultimately given 6mg IV adenosine with successful conversion to NSR  Check CBC, CMP, TSH, repeat EKG, and CXR  IV fluid bolus  Final assessment: Labs reveal a leukocytosis with a WBC of 16 which patient states is chronic  Glucose 152  Remainder of labs including electrolytes and TSH are normal  Repeat EKG without ischemic changes  She is asymptomatic on multiple re-evaluations  No indication for admission  Stressed the importance of outpatient f/u with cardiology  ED return precautions discussed  Patient expressed understanding and is agreeable to plan  Patient discharged in stable condition           Amount and/or Complexity of Data Reviewed  Clinical lab tests: ordered and reviewed  Tests in the radiology section of CPT®: ordered and reviewed  Tests in the medicine section of CPT®: reviewed and ordered  Review and summarize past medical records: yes  Independent visualization of images, tracings, or specimens: yes    Risk of Complications, Morbidity, and/or Mortality  Presenting problems: high  Diagnostic procedures: high  Management options: high    Patient Progress  Patient progress: resolved      Disposition  Final diagnoses:   SVT (supraventricular tachycardia) ([address] )     Time reflects when diagnosis was documented in both MDM as applicable and the Disposition within this note     Time User Action Codes Description Comment    10/30/2022 10:52 AM Zuri Epperson Add [I47 1] SVT (supraventricular tachycardia) [facility])       ED Disposition     ED Disposition   Discharge    Condition   Stable    Date/Time   Sun Oct 30, 2022 10:52 AM    Comment   Jean-Claude Phan discharge to home/self care                 Follow-up Information     Follow up With Specialties Details Why Contact Info Additional [facility] [place] [facility] Schedule an appointment as soon as possible for a visit   [address]  [facility] [address] [facility] [place] [address] Cardiology [address] [facility] [address] [place], [place], [address] [facility] Emergency Department Emergency Medicine  If symptoms worsen [address] [facility] [address] [facility] Emergency Department, [address] [place], [place], [address]          Discharge Medication List as of 10/30/2022 10:53 AM      CONTINUE these medications which have NOT CHANGED    Details   clonazePAM (KlonoPIN) 1 mg tablet Take 0 5 mg by mouth 4 (four) times a day as needed, Historical Med      CLONIDINE-CHLORTHALIDONE PO Take by mouth, Historical Med      PARoxetine (PAXIL) 30 mg tablet Take 60 mg by mouth every morning, Historical Med      QUEtiapine Fumarate (SEROQUEL PO) Take by mouth, Historical Med      Sertraline HCl (ZOLOFT PO) Take by mouth, Historical Med                 PDMP Review     None ED Provider  Electronically Signed by           Grant Steiner PA-C  10/30/22 7499

## 2022-10-30 NOTE — DISCHARGE INSTRUCTIONS
Please call tomorrow to schedule a follow-up appointment with cardiology  Return to the ER with any new or worsening symptoms

## 2022-11-30 ENCOUNTER — CONSULT (OUTPATIENT)
Dept: CARDIOLOGY CLINIC | Facility: CLINIC | Age: 62
End: 2022-11-30

## 2022-11-30 VITALS
WEIGHT: 181 LBS | DIASTOLIC BLOOD PRESSURE: 62 MMHG | HEART RATE: 71 BPM | OXYGEN SATURATION: 97 % | BODY MASS INDEX: 27.43 KG/M2 | SYSTOLIC BLOOD PRESSURE: 115 MMHG | HEIGHT: 68 IN | RESPIRATION RATE: 16 BRPM

## 2022-11-30 DIAGNOSIS — I47.1 SVT (SUPRAVENTRICULAR TACHYCARDIA) (HCC): Primary | ICD-10-CM

## 2022-11-30 DIAGNOSIS — F41.9 ANXIETY DISORDER, UNSPECIFIED TYPE: ICD-10-CM

## 2022-11-30 DIAGNOSIS — R06.02 SHORTNESS OF BREATH ON EXERTION: ICD-10-CM

## 2022-11-30 DIAGNOSIS — Z72.0 TOBACCO ABUSE: ICD-10-CM

## 2022-11-30 NOTE — PROGRESS NOTES
[address] [facility]  [address], [facility] [place]  Tel: [phone]      NAME: Jose Angel Call  AGE: 65 y o  SEX: female  : 1960  MRN: [id number]      Chief Complaint:  Chief Complaint   Patient presents with   • New Patient Visit         History of Present Illness:   Patient is an RN by profession  58-year-old with an anxiety disorder who states she has been suffering from SVT for over 30 years  Initially her episodes used to be once or twice a year but recently they have become more frequent occurring every few months  Earlier the episodes could easily be resolved by vagal maneuvers but now vagal maneuvers do not seem to be working that efficiently  Last time she had to go to the ED and almost got an adenosine injection before the rhythm broke  Pt states she was recommended ablation before but did not want to get done in her 35s but is willing to get it done now  Patient complains of shortness of breath during the episodes but denies associated chest pain  Pt also denies lightheadedness, syncope, swelling feet, orthopnea, PND, claudication  Past Medical History:  Past Medical History:   Diagnosis Date   • Anxiety    • SVT (supraventricular tachycardia) ([facility] [address] )          Past Surgical History:  Past Surgical History:   Procedure Laterality Date   • JOINT REPLACEMENT      right hip         Family History:  History reviewed  No pertinent family history  Social History:  Social History     Socioeconomic History   • Marital status: /Civil Union     Spouse name: None   • Number of children: None   • Years of education: None   • Highest education level: None   Occupational History   • None   Tobacco Use   • Smoking status: Every Day     Packs/day: 1 00     Types: Cigarettes   • Smokeless tobacco: Never   Substance and Sexual Activity   • Alcohol use: Yes     Comment: occ   • Drug use:  Yes Types: Marijuana     Comment: medical marijuana   • Sexual activity: Not Currently     Partners: Male   Other Topics Concern   • None   Social History Narrative   • None     Social Determinants of Health     Financial Resource Strain: Not on file   Food Insecurity: Not on file   Transportation Needs: Not on file   Physical Activity: Not on file   Stress: Not on file   Social Connections: Not on file   Intimate Partner Violence: Not on file   Housing Stability: Not on file         Active Problems: There is no problem list on file for this patient  The following portions of the patient's history were reviewed and updated as appropriate: past medical history, past surgical history, past family history,  past social history, current medications, allergies and problem list       Review of Systems:  Constitutional: Denies fever, chills  Eyes: Denies eye redness, eye discharge  ENT: Denies hearing loss, sneezing, nasal discharge, sore throat   Respiratory: Denies cough, expectoration  +shortness of breath  Cardiovascular: Denies chest pain,lower extremity swelling  +palpitations  Gastrointestinal: Denies abdominal pain, nausea, vomiting, diarrhea  Genito-Urinary: Denies dysuria, incontinence  Musculoskeletal: Denies back pain, joint pain, muscle pain  Neurologic: Denies lightheadedness, syncope, headache, seizures  Endocrine: Denies polydipsia, temperature intolerance  Allergy and Immunology: Denies hives, insect bite sensitivity  Hematological and Lymphatic: Denies bleeding problems, swollen glands   Psychological: Denies depression, suicidal ideation, anxiety, panic  Dermatological: Denies pruritus, rash, skin lesion changes      Vitals:  Vitals:    11/30/22 0922   BP: 115/62   Pulse: 71   Resp: 16   SpO2: 97%       Body mass index is 27 52 kg/m²  Weight (last 2 days)     Date/Time Weight    11/30/22 0922 82 1 (181)            Physical Examination:  General: Patient is not in acute distress   Awake, alert, oriented in time, place and person  Responding to commands  Head: Normocephalic  Atraumatic  Eyes: Both pupils normal sized, round and reactive to light  Nonicteric  ENT: Normal external ear canals  Neck: Supple  JVP not raised  Trachea central  No thyromegaly  Lungs: Bilateral bronchovascular breath sounds with no crackles or rhonchi  Chest wall: No tenderness  Cardiovascular: RRR  S1 and S2 normal  No murmur, rub or gallop  Gastrointestinal: Abdomen soft, nontender  No guarding or rigidity  Liver and spleen not palpable  Bowel sounds present  Neurologic: Patient is awake, alert, oriented in time, place and person  Responding to commands  Moving all extremities  Integumentary:  No skin rash  Lymphatic: No cervical lymphadenopathy  Back: Symmetric   No CVA tenderness  Extremities: No clubbing, cyanosis or edema      Laboratory Results:  CBC with diff:   Lab Results   Component Value Date    WBC 16 14 (H) 10/30/2022    RBC 4 78 10/30/2022    HGB 14 1 10/30/2022    HCT 43 1 10/30/2022    MCV 90 10/30/2022    MCH 29 5 10/30/2022    RDW 13 9 10/30/2022     10/30/2022       CMP:  Lab Results   Component Value Date    CREATININE 0 95 10/30/2022    BUN 10 10/30/2022    K 4 1 10/30/2022     10/30/2022    CO2 26 10/30/2022    ALKPHOS 106 10/30/2022    ALT 19 10/30/2022    AST 21 10/30/2022       Lab Results   Component Value Date    HGBA1C 6 1 (H) 02/15/2021    MG 2 0 11/26/2017    PHOS 3 0 11/26/2017       Lab Results   Component Value Date    TROPONINI 0 02 11/26/2017       EKG:  Patient has multiple EKGs in the system depicting the SVT episodes      Medications:    Current Outpatient Medications:   •  clonazePAM (KlonoPIN) 1 mg tablet, Take 0 5 mg by mouth 3 (three) times a day, Disp: , Rfl:   •  CLONIDINE-CHLORTHALIDONE PO, Take by mouth 2 (two) times a day, Disp: , Rfl:   •  QUEtiapine Fumarate (SEROQUEL PO), Take by mouth, Disp: , Rfl:   •  Sertraline HCl (ZOLOFT PO), Take by mouth once, Disp: , Rfl: Allergies:  No Known Allergies      Assessment and Plan:  1  SVT (supraventricular tachycardia) (HCC)  Cardizem was not started due to soft BP  Ablation offered  Referred to EP at [facility]    - Ambulatory Referral to Cardiology    2  Anxiety disorder, unspecified type  Continue your regular medications in association with a PCP/psychiatrist    3  Shortness of breath on exertion  2D echocardiogram ordered for EF, RWMA  Pharmacological nuclear stress test ordered for evaluation as this could be an anginal equivalent  Patient not being exercised on the treadmill for risk of going into SVT    4  Tobacco abuse  Counseled to try to stop smoking    Recommend aggressive risk factor modification and therapeutic lifestyle changes  Low-salt, low-calorie, low-fat, low-cholesterol diet with regular exercise and to optimize weight  I will defer the ordering and monitoring of necessity lab studies to you, but I am available and happy to review and manage any of the data at your request in the future  Discussed concepts of atherosclerosis, including signs and symptoms of cardiac disease  Previous studies were reviewed  Safety measures were reviewed  Questions were entertained and answered  Patient was advised to report any problems requiring medical attention  Follow-up with PCP and appropriate specialist and lab work as discussed  Return for follow up visit as scheduled or earlier, if needed  Thank you for allowing me to participate in the care and evaluation of your patient  Should you have any questions, please feel free to contact me        Roe Badillo MD  11/30/2022,10:00 AM

## 2023-01-06 ENCOUNTER — HOSPITAL ENCOUNTER (OUTPATIENT)
Dept: NON INVASIVE DIAGNOSTICS | Facility: CLINIC | Age: 63
Discharge: HOME | End: 2023-01-06

## 2023-01-06 ENCOUNTER — HOSPITAL ENCOUNTER (OUTPATIENT)
Dept: NON INVASIVE DIAGNOSTICS | Facility: CLINIC | Age: 63
End: 2023-01-06

## 2023-01-06 VITALS
DIASTOLIC BLOOD PRESSURE: 60 MMHG | WEIGHT: 181 LBS | SYSTOLIC BLOOD PRESSURE: 91 MMHG | HEIGHT: 68 IN | HEART RATE: 79 BPM | BODY MASS INDEX: 27.43 KG/M2

## 2023-01-06 DIAGNOSIS — I47.1 SVT (SUPRAVENTRICULAR TACHYCARDIA) (HCC): ICD-10-CM

## 2023-01-06 DIAGNOSIS — R06.02 SHORTNESS OF BREATH ON EXERTION: ICD-10-CM

## 2023-01-06 LAB
AORTIC ROOT: 3.2 CM
APICAL FOUR CHAMBER EJECTION FRACTION: 63 %
ASCENDING AORTA: 3 CM
E WAVE DECELERATION TIME: 167 MS
FRACTIONAL SHORTENING: 36 % (ref 28–44)
INTERVENTRICULAR SEPTUM IN DIASTOLE (PARASTERNAL SHORT AXIS VIEW): 0.9 CM
INTERVENTRICULAR SEPTUM: 0.9 CM (ref 0.6–1.1)
LAAS-AP2: 15.6 CM2
LAAS-AP4: 18.8 CM2
LEFT ATRIUM AREA SYSTOLE SINGLE PLANE A4C: 17.7 CM2
LEFT ATRIUM SIZE: 3 CM
LEFT INTERNAL DIMENSION IN SYSTOLE: 2.9 CM (ref 2.1–4)
LEFT VENTRICULAR INTERNAL DIMENSION IN DIASTOLE: 4.5 CM (ref 3.5–6)
LEFT VENTRICULAR POSTERIOR WALL IN END DIASTOLE: 0.9 CM
LEFT VENTRICULAR STROKE VOLUME: 58 ML
LVSV (TEICH): 58 ML
MV E'TISSUE VEL-SEP: 9 CM/S
MV PEAK A VEL: 0.52 M/S
MV PEAK E VEL: 44 CM/S
MV STENOSIS PRESSURE HALF TIME: 48 MS
MV VALVE AREA P 1/2 METHOD: 4.58 CM2
RIGHT ATRIUM AREA SYSTOLE A4C: 14.5 CM2
RIGHT VENTRICLE ID DIMENSION: 3.4 CM
SL CV LEFT ATRIUM LENGTH A2C: 4.9 CM
SL CV PED ECHO LEFT VENTRICLE DIASTOLIC VOLUME (MOD BIPLANE) 2D: 91 ML
SL CV PED ECHO LEFT VENTRICLE SYSTOLIC VOLUME (MOD BIPLANE) 2D: 33 ML
TR MAX PG: 27 MMHG
TR PEAK VELOCITY: 2.6 M/S
TRICUSPID VALVE PEAK REGURGITATION VELOCITY: 2.6 M/S

## 2023-01-13 ENCOUNTER — HOSPITAL ENCOUNTER (OUTPATIENT)
Dept: NON INVASIVE DIAGNOSTICS | Facility: CLINIC | Age: 63
Discharge: HOME | End: 2023-01-13

## 2023-01-13 VITALS
HEART RATE: 71 BPM | SYSTOLIC BLOOD PRESSURE: 118 MMHG | BODY MASS INDEX: 27.43 KG/M2 | WEIGHT: 181 LBS | HEIGHT: 68 IN | OXYGEN SATURATION: 95 % | DIASTOLIC BLOOD PRESSURE: 88 MMHG

## 2023-01-13 LAB
CHEST PAIN STATEMENT: NORMAL
MAX DIASTOLIC BP: 82 MMHG
MAX HEART RATE: 123 BPM
MAX PREDICTED HEART RATE: 159 BPM
MAX. SYSTOLIC BP: 156 MMHG
NUC STRESS EJECTION FRACTION: 66 %
PROTOCOL NAME: NORMAL
RATE PRESSURE PRODUCT: NORMAL
REASON FOR TERMINATION: NORMAL
SL CV REST NUCLEAR ISOTOPE DOSE: 10.9 MCI
SL CV STRESS NUCLEAR ISOTOPE DOSE: 30.6 MCI
SL CV STRESS RECOVERY BP: NORMAL MMHG
SL CV STRESS RECOVERY HR: 100 BPM
SL CV STRESS RECOVERY O2 SAT: 97 %
STRESS ANGINA INDEX: 0
STRESS BASELINE BP: NORMAL MMHG
STRESS BASELINE HR: 71 BPM
STRESS O2 SAT REST: 95 %
STRESS PEAK HR: 123 BPM
STRESS POST O2 SAT PEAK: 99 %
STRESS POST PEAK BP: 148 MMHG
STRESS/REST PERFUSION RATIO: 1.04
TARGET HR FORMULA: NORMAL
TIME IN EXERCISE PHASE: NORMAL

## 2023-01-13 RX ADMIN — REGADENOSON 0.4 MG: 0.08 INJECTION, SOLUTION INTRAVENOUS at 09:04

## 2023-01-16 ENCOUNTER — TELEPHONE (OUTPATIENT)
Dept: CARDIOLOGY CLINIC | Facility: CLINIC | Age: 63
End: 2023-01-16

## 2023-01-16 NOTE — TELEPHONE ENCOUNTER
----- Message from Makenzie Lazo MD sent at 1/13/2023  4:43 PM EST -----  Please call and inform the patient that the stress test was normal

## 2023-01-16 NOTE — TELEPHONE ENCOUNTER
Patient called back relayed Dr Chung response regarding stress test results,  patient verbally understood

## 2023-02-15 ENCOUNTER — OFFICE VISIT (OUTPATIENT)
Dept: CARDIOLOGY CLINIC | Facility: CLINIC | Age: 63
End: 2023-02-15

## 2023-02-15 VITALS
DIASTOLIC BLOOD PRESSURE: 60 MMHG | HEIGHT: 68 IN | OXYGEN SATURATION: 97 % | WEIGHT: 180 LBS | RESPIRATION RATE: 16 BRPM | BODY MASS INDEX: 27.28 KG/M2 | SYSTOLIC BLOOD PRESSURE: 100 MMHG | HEART RATE: 87 BPM

## 2023-02-15 DIAGNOSIS — I47.1 SVT (SUPRAVENTRICULAR TACHYCARDIA) (HCC): Primary | ICD-10-CM

## 2023-02-15 DIAGNOSIS — F41.9 ANXIETY DISORDER, UNSPECIFIED TYPE: ICD-10-CM

## 2023-02-15 DIAGNOSIS — Z72.0 TOBACCO ABUSE: ICD-10-CM

## 2023-02-15 NOTE — PROGRESS NOTES
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45-year-old with an anxiety disorder who states that she has been suffering from SVT for over 30 years  Initially her episodes used to be once or twice a year but recently they have become more frequent occurring every few months  Earlier the episodes could easily be resolved by vagal maneuvers but now vagal maneuvers do not seem to be working that effectively  Last time she had to go to the ED and almost got an adenosine injection before the rhythm broke  Pt states she was recommended ablation before but did not want to get done in her 35s but is willing to consider it now  Patient complains of shortness of breath during the episodes but denies associated chest pain  Pt denies chest pain / pressure, lightheadedness, syncope, swelling feet, orthopnea, PND, claudication  Past Medical History:  Past Medical History:   Diagnosis Date   • Anxiety    • SVT (supraventricular tachycardia) ([address] )          Past Surgical History:  Past Surgical History:   Procedure Laterality Date   • JOINT REPLACEMENT      right hip         Family History:  History reviewed  No pertinent family history        Social History:  Social History     Socioeconomic History   • Marital status: /Civil Union     Spouse name: None   • Number of children: None   • Years of education: None   • Highest education level: None   Occupational History   • None   Tobacco Use   • Smoking status: Every Day     Packs/day: 1 00     Types: Cigarettes   • Smokeless tobacco: Never   Substance and Sexual Activity   • Alcohol use: Yes     Comment: occ   • Drug use: Yes     Types: Marijuana     Comment: medical marijuana   • Sexual activity: Not Currently     Partners: Male   Other Topics Concern   • None   Social History Narrative   • None     Social Determinants of Health     Financial Resource Strain: Not on file   Food Insecurity: Not on file   Transportation Needs: Not on file   Physical Activity: Not on file   Stress: Not on file   Social Connections: Not on file   Intimate Partner Violence: Not on file   Housing Stability: Not on file         Active Problems: There is no problem list on file for this patient  The following portions of the patient's history were reviewed and updated as appropriate: past medical history, past surgical history, past family history,  past social history, current medications, allergies and problem list       Review of Systems:  Constitutional: Denies fever, chills  Eyes: Denies eye redness, eye discharge  ENT: Denies hearing loss, sneezing, nasal discharge, sore throat   Respiratory: Denies cough, expectoration  +shortness of breath  Cardiovascular: Denies chest pain  +palpitations  Gastrointestinal: Denies abdominal pain, nausea, vomiting, diarrhea  Genito-Urinary: Denies dysuria, incontinence  Musculoskeletal: Denies back pain, joint pain, muscle pain  Neurologic: Denies lightheadedness, syncope, headache, seizures  Endocrine: Denies polydipsia, temperature intolerance  Allergy and Immunology: Denies hives, insect bite sensitivity  Hematological and Lymphatic: Denies bleeding problems, swollen glands   Psychological: Denies depression, suicidal ideation, anxiety, panic  Dermatological: Denies pruritus, rash, skin lesion changes      Vitals:  Vitals:    02/15/23 0949   BP: 100/60   Pulse: 87   Resp: 16   SpO2: 97%       Body mass index is 27 37 kg/m²  Weight (last 2 days)     Date/Time Weight    02/15/23 0949 81 6 (180)            Physical Examination:  General: Patient is not in acute distress  Awake, alert, oriented in time, place and person  Responding to commands  Head: Normocephalic  Atraumatic  Eyes: Both pupils normal sized, round and reactive to light  Nonicteric  ENT: Normal external ear canals  Neck: Supple  JVP not raised  Trachea central  No thyromegaly  Lungs: Bilateral bronchovascular breath sounds with no crackles or rhonchi  Chest wall: No tenderness  Cardiovascular: RRR  S1 and S2 normal  No murmur, rub or gallop  Gastrointestinal: Abdomen soft, nontender  No guarding or rigidity  Liver and spleen not palpable  Bowel sounds present  Neurologic: Patient is awake, alert, oriented in time, place and person  Responding to commands  Moving all extremities  Integumentary:  No skin rash  Lymphatic: No cervical lymphadenopathy  Back: Symmetric  No CVA tenderness  Extremities: No clubbing, cyanosis or edema      Laboratory Results:  CBC with diff:   Lab Results   Component Value Date    WBC 16 14 (H) 10/30/2022    RBC 4 78 10/30/2022    HGB 14 1 10/30/2022    HCT 43 1 10/30/2022    MCV 90 10/30/2022    MCH 29 5 10/30/2022    RDW 13 9 10/30/2022     10/30/2022       CMP:  Lab Results   Component Value Date    CREATININE 0 95 10/30/2022    BUN 10 10/30/2022    K 4 1 10/30/2022     10/30/2022    CO2 26 10/30/2022    ALKPHOS 106 10/30/2022    ALT 19 10/30/2022    AST 21 10/30/2022       Lab Results   Component Value Date    HGBA1C 6 1 (H) 02/15/2021    MG 2 0 11/26/2017    PHOS 3 0 11/26/2017       Lab Results   Component Value Date    TROPONINI 0 02 11/26/2017       Cardiac testing:   Results for orders placed during the hospital encounter of 01/06/23    Echo complete w/ contrast if indicated    Interpretation Summary  •  Left Ventricle: Left ventricular cavity size is normal  Wall thickness is normal  Systolic function is normal (55%)  Wall motion is normal  Diastolic function is normal   •  Right Ventricle: Right ventricular cavity size is normal  Systolic function is normal   •  Mitral Valve: There is mild regurgitation    • Tricuspid Valve: There is mild regurgitation  The right ventricular systolic pressure is normal   •  Pulmonic Valve: There is trace regurgitation  Results for orders placed during the hospital encounter of 01/06/23    NM myocardial perfusion spect (rx stress and/or rest)    Interpretation Summary  •  Resting ECG: The ECG shows normal sinus rhythm  •  Stress ECG: Arrhythmias during stress: rare PACs  The ECG was not diagnostic due to pharmacological (vasodilator) stress  •  Perfusion: There is a left ventricular perfusion defect that is small in size present in the apical anteroseptal location(s) that is paradoxical   •  Perfusion Defect Conclusion: The stress/rest perfusion ratio is 1 04   There is no evidence of transient ischemic dilation (TID)  •  Stress Function: Left ventricular function post-stress is normal  Post-stress ejection fraction is 66 %  Normal study after pharmacologic vasodilation  There was image artifact without diagnostic evidence of perfusion abnormality  Left ventricular function was preserved  Medications:    Current Outpatient Medications:   •  clonazePAM (KlonoPIN) 1 mg tablet, Take 0 5 mg by mouth 3 (three) times a day, Disp: , Rfl:   •  CLONIDINE-CHLORTHALIDONE PO, Take by mouth 2 (two) times a day, Disp: , Rfl:   •  QUEtiapine Fumarate (SEROQUEL PO), Take by mouth, Disp: , Rfl:   •  Sertraline HCl (ZOLOFT PO), Take by mouth once, Disp: , Rfl:       Allergies:  No Known Allergies      Assessment and Plan:  1  SVT (supraventricular tachycardia) (HCC)  Cardizem not started due to soft BP  Ablation offered  Will refer to EP at [place]    2  Anxiety disorder, unspecified type  Management per psychiatrist/PCP    3  Tobacco abuse  Counseled to try to stop smoking    Recommend aggressive risk factor modification and therapeutic lifestyle changes  Low-salt, low-calorie, low-fat, low-cholesterol diet with regular exercise and to optimize weight    I will defer the ordering and monitoring of necessity lab studies to you, but I am available and happy to review and manage any of the data at your request in the future  Discussed concepts of atherosclerosis, including signs and symptoms of cardiac disease  Previous studies were reviewed  Safety measures were reviewed  Questions were entertained and answered  Patient was advised to report any problems requiring medical attention  Follow-up with PCP and appropriate specialist and lab work as discussed  Return for follow up visit as scheduled or earlier, if needed  Thank you for allowing me to participate in the care and evaluation of your patient  Should you have any questions, please feel free to contact me        Alcides Thompson MD  2/15/2023,10:19 AM

## 2023-02-25 ENCOUNTER — HOSPITAL ENCOUNTER (EMERGENCY)
Facility: HOSPITAL | Age: 63
Discharge: HOME | End: 2023-02-25
Attending: EMERGENCY MEDICINE

## 2023-02-25 ENCOUNTER — APPOINTMENT (EMERGENCY)
Dept: RADIOLOGY | Facility: HOSPITAL | Age: 63
End: 2023-02-25

## 2023-02-25 VITALS
DIASTOLIC BLOOD PRESSURE: 73 MMHG | WEIGHT: 175 LBS | SYSTOLIC BLOOD PRESSURE: 123 MMHG | OXYGEN SATURATION: 98 % | HEART RATE: 112 BPM | RESPIRATION RATE: 18 BRPM | TEMPERATURE: 98 F | BODY MASS INDEX: 26.61 KG/M2

## 2023-02-25 DIAGNOSIS — R00.2 PALPITATIONS: Primary | ICD-10-CM

## 2023-02-25 LAB
2HR DELTA HS TROPONIN: 16 NG/L
ALBUMIN SERPL BCP-MCNC: 3.7 G/DL (ref 3.5–5)
ALP SERPL-CCNC: 79 U/L (ref 34–104)
ALT SERPL W P-5'-P-CCNC: 12 U/L (ref 7–52)
ANION GAP SERPL CALCULATED.3IONS-SCNC: 7 MMOL/L (ref 4–13)
AST SERPL W P-5'-P-CCNC: 14 U/L (ref 13–39)
BASOPHILS # BLD AUTO: 0.05 THOUSANDS/ÂΜL (ref 0–0.1)
BASOPHILS NFR BLD AUTO: 0 % (ref 0–1)
BILIRUB SERPL-MCNC: 0.28 MG/DL (ref 0.2–1)
BUN SERPL-MCNC: 13 MG/DL (ref 5–25)
CALCIUM SERPL-MCNC: 9.5 MG/DL (ref 8.4–10.2)
CARDIAC TROPONIN I PNL SERPL HS: 12 NG/L
CARDIAC TROPONIN I PNL SERPL HS: 28 NG/L
CHLORIDE SERPL-SCNC: 108 MMOL/L (ref 96–108)
CO2 SERPL-SCNC: 26 MMOL/L (ref 21–32)
CREAT SERPL-MCNC: 0.86 MG/DL (ref 0.6–1.3)
EOSINOPHIL # BLD AUTO: 0.11 THOUSAND/ÂΜL (ref 0–0.61)
EOSINOPHIL NFR BLD AUTO: 1 % (ref 0–6)
ERYTHROCYTE [DISTWIDTH] IN BLOOD BY AUTOMATED COUNT: 14 % (ref 11.6–15.1)
GFR SERPL CREATININE-BSD FRML MDRD: 73 ML/MIN/1.73SQ M
GLUCOSE SERPL-MCNC: 96 MG/DL (ref 65–140)
HCT VFR BLD AUTO: 45 % (ref 34.8–46.1)
HGB BLD-MCNC: 14.7 G/DL (ref 11.5–15.4)
IMM GRANULOCYTES # BLD AUTO: 0.03 THOUSAND/UL (ref 0–0.2)
IMM GRANULOCYTES NFR BLD AUTO: 0 % (ref 0–2)
LYMPHOCYTES # BLD AUTO: 2.53 THOUSANDS/ÂΜL (ref 0.6–4.47)
LYMPHOCYTES NFR BLD AUTO: 21 % (ref 14–44)
MAGNESIUM SERPL-MCNC: 2.1 MG/DL (ref 1.9–2.7)
MCH RBC QN AUTO: 29 PG (ref 26.8–34.3)
MCHC RBC AUTO-ENTMCNC: 32.7 G/DL (ref 31.4–37.4)
MCV RBC AUTO: 89 FL (ref 82–98)
MONOCYTES # BLD AUTO: 0.62 THOUSAND/ÂΜL (ref 0.17–1.22)
MONOCYTES NFR BLD AUTO: 5 % (ref 4–12)
NEUTROPHILS # BLD AUTO: 8.77 THOUSANDS/ÂΜL (ref 1.85–7.62)
NEUTS SEG NFR BLD AUTO: 73 % (ref 43–75)
NRBC BLD AUTO-RTO: 0 /100 WBCS
PLATELET # BLD AUTO: 303 THOUSANDS/UL (ref 149–390)
PMV BLD AUTO: 9.8 FL (ref 8.9–12.7)
POTASSIUM SERPL-SCNC: 4 MMOL/L (ref 3.5–5.3)
PROT SERPL-MCNC: 7 G/DL (ref 6.4–8.4)
RBC # BLD AUTO: 5.07 MILLION/UL (ref 3.81–5.12)
SODIUM SERPL-SCNC: 141 MMOL/L (ref 135–147)
TSH SERPL DL<=0.05 MIU/L-ACNC: 1.94 UIU/ML (ref 0.45–4.5)
WBC # BLD AUTO: 12.11 THOUSAND/UL (ref 4.31–10.16)

## 2023-02-25 RX ADMIN — SODIUM CHLORIDE 1000 ML: 0.9 INJECTION, SOLUTION INTRAVENOUS at 10:17

## 2023-02-25 NOTE — ED PROVIDER NOTES
History  Chief Complaint   Patient presents with   • Palpitations     Pt reports her heart has been racing x 2 hrs  62yo female with a history of SVT and anxiety presenting for evaluation of palpitations x 2 hours  Patient was cleaning horse stalls around 8am when she started to have an abrupt onset of palpitations that felt like her heart was racing  This was associated with shortness of breath and felt identical to her previous bouts of SVT  She tried vagal maneuvers at home and states her symptoms are now resolved  She has a history of recurrent SVT for 30 years and episodes are becoming more frequent  She has been following with cardiology and underwent stress test and echocardiogram last month which were normal  She is in the process of scheduling an ablation with electrophysiology  History provided by:  Patient and medical records   used: No    Palpitations  Palpitations quality:  Fast  Onset quality:  Sudden  Duration:  2 hours  Timing:  Constant  Progression:  Resolved  Chronicity:  Recurrent  Context comment:  Hx of recurrent SVT  Relieved by:  Valsalva  Worsened by:  Nothing  Associated symptoms: shortness of breath    Associated symptoms: no chest pain, no diaphoresis, no dizziness, no nausea, no near-syncope, no numbness, no syncope, no vomiting and no weakness        Prior to Admission Medications   Prescriptions Last Dose Informant Patient Reported? Taking?    CLONIDINE-CHLORTHALIDONE PO   Yes No   Sig: Take by mouth 2 (two) times a day   QUEtiapine Fumarate (SEROQUEL PO)   Yes No   Sig: Take by mouth   Sertraline HCl (ZOLOFT PO)   Yes No   Sig: Take by mouth once   clonazePAM (KlonoPIN) 1 mg tablet   Yes No   Sig: Take 0 5 mg by mouth 3 (three) times a day      Facility-Administered Medications: None       Past Medical History:   Diagnosis Date   • Anxiety    • SVT (supraventricular tachycardia) (HCC)        Past Surgical History:   Procedure Laterality Date   • JOINT REPLACEMENT      right hip       History reviewed  No pertinent family history  I have reviewed and agree with the history as documented  E-Cigarette/Vaping     E-Cigarette/Vaping Substances     Social History     Tobacco Use   • Smoking status: Every Day     Packs/day: 1 00     Types: Cigarettes   • Smokeless tobacco: Never   Substance Use Topics   • Alcohol use: Yes     Comment: occ   • Drug use: Yes     Types: Marijuana     Comment: medical marijuana       Review of Systems   Constitutional: Negative for diaphoresis and fever  Eyes: Negative for discharge and redness  Respiratory: Positive for shortness of breath  Negative for stridor  Cardiovascular: Positive for palpitations  Negative for chest pain, syncope and near-syncope  Gastrointestinal: Negative for nausea and vomiting  Skin: Negative for color change and rash  Neurological: Negative for dizziness, weakness and numbness  All other systems reviewed and are negative  Physical Exam  Physical Exam  Vitals and nursing note reviewed  Constitutional:       General: She is not in acute distress  Appearance: She is well-developed  She is not diaphoretic  HENT:      Head: Normocephalic and atraumatic  Right Ear: External ear normal       Left Ear: External ear normal       Nose: Nose normal    Eyes:      General: No scleral icterus  Right eye: No discharge  Left eye: No discharge  Conjunctiva/sclera: Conjunctivae normal    Cardiovascular:      Rate and Rhythm: Normal rate and regular rhythm  Heart sounds: Normal heart sounds  No murmur heard  Pulmonary:      Effort: Pulmonary effort is normal  No respiratory distress  Breath sounds: Normal breath sounds  No stridor  No wheezing or rales  Musculoskeletal:         General: No deformity  Normal range of motion  Cervical back: Normal range of motion and neck supple  Lymphadenopathy:      Cervical: No cervical adenopathy     Skin:     General: Skin is warm and dry  Neurological:      General: No focal deficit present  Mental Status: She is alert  She is not disoriented  GCS: GCS eye subscore is 4  GCS verbal subscore is 5  GCS motor subscore is 6  Psychiatric:         Behavior: Behavior normal          Vital Signs  ED Triage Vitals [02/25/23 0945]   Temperature Pulse Respirations Blood Pressure SpO2   98 °F (36 7 °C) (!) 112 18 123/73 98 %      Temp Source Heart Rate Source Patient Position - Orthostatic VS BP Location FiO2 (%)   Tympanic Monitor Sitting Left arm --      Pain Score       --           Vitals:    02/25/23 0945   BP: 123/73   Pulse: (!) 112   Patient Position - Orthostatic VS: Sitting         Visual Acuity      ED Medications  Medications   sodium chloride 0 9 % bolus 1,000 mL (0 mL Intravenous Stopped 2/25/23 1212)       Diagnostic Studies  Results Reviewed     Procedure Component Value Units Date/Time    HS Troponin I 2hr [[id number]]  (Normal) Collected: 02/25/23 1221    Lab Status: Final result Specimen: Blood from Arm, Right Updated: 02/25/23 1254     hs TnI 2hr 28 ng/L      Delta 2hr hsTnI 16 ng/L     HS Troponin I 4hr [[id number]]     Lab Status: No result Specimen: Blood     TSH, 3rd generation with Free T4 reflex [[id number]]  (Normal) Collected: 02/25/23 1002    Lab Status: Final result Specimen: Blood from Arm, Right Updated: 02/25/23 1053     TSH 3RD GENERATON 1 935 uIU/mL     Narrative:      Patients undergoing fluorescein dye angiography may retain small amounts of fluorescein in the body for 48-72 hours post procedure  Samples containing fluorescein can produce falsely depressed TSH values  If the patient had this procedure,a specimen should be resubmitted post fluorescein clearance        HS Troponin 0hr (reflex protocol) [[id number]]  (Normal) Collected: 02/25/23 1002    Lab Status: Final result Specimen: Blood from Arm, Right Updated: 02/25/23 1044     hs TnI 0hr 12 ng/L     Comprehensive metabolic panel [[id number]] Collected: 02/25/23 1002    Lab Status: Final result Specimen: Blood from Arm, Right Updated: 02/25/23 1040     Sodium 141 mmol/L      Potassium 4 0 mmol/L      Chloride 108 mmol/L      CO2 26 mmol/L      ANION GAP 7 mmol/L      BUN 13 mg/dL      Creatinine 0 86 mg/dL      Glucose 96 mg/dL      Calcium 9 5 mg/dL      AST 14 U/L      ALT 12 U/L      Alkaline Phosphatase 79 U/L      Total Protein 7 0 g/dL      Albumin 3 7 g/dL      Total Bilirubin 0 28 mg/dL      eGFR 73 ml/min/1 73sq m     Narrative:      National Kidney Disease Foundation guidelines for Chronic Kidney Disease (CKD):   •  Stage 1 with normal or high GFR (GFR > 90 mL/min/1 73 square meters)  •  Stage 2 Mild CKD (GFR = 60-89 mL/min/1 73 square meters)  •  Stage 3A Moderate CKD (GFR = 45-59 mL/min/1 73 square meters)  •  Stage 3B Moderate CKD (GFR = 30-44 mL/min/1 73 square meters)  •  Stage 4 Severe CKD (GFR = 15-29 mL/min/1 73 square meters)  •  Stage 5 End Stage CKD (GFR <15 mL/min/1 73 square meters)  Note: GFR calculation is accurate only with a steady state creatinine    Magnesium [[id number]]  (Normal) Collected: 02/25/23 1002    Lab Status: Final result Specimen: Blood from Arm, Right Updated: 02/25/23 1040     Magnesium 2 1 mg/dL     CBC and differential [[id number]]  (Abnormal) Collected: 02/25/23 1002    Lab Status: Final result Specimen: Blood from Arm, Right Updated: 02/25/23 1013     WBC 12 11 Thousand/uL      RBC 5 07 Million/uL      Hemoglobin 14 7 g/dL      Hematocrit 45 0 %      MCV 89 fL      MCH 29 0 pg      MCHC 32 7 g/dL      RDW 14 0 %      MPV 9 8 fL      Platelets 724 Thousands/uL      nRBC 0 /100 WBCs      Neutrophils Relative 73 %      Immat GRANS % 0 %      Lymphocytes Relative 21 %      Monocytes Relative 5 %      Eosinophils Relative 1 %      Basophils Relative 0 %      Neutrophils Absolute 8 77 Thousands/µL      Immature Grans Absolute 0 03 Thousand/uL      Lymphocytes Absolute 2 53 Thousands/µL      Monocytes Absolute 0 62 Thousand/µL      Eosinophils Absolute 0 11 Thousand/µL      Basophils Absolute 0 05 Thousands/µL                  No orders to display              Procedures  ECG 12 Lead Documentation Only    Date/Time: 2/25/2023 4:02 PM  Performed by: Colletta Lain, PA-C  Authorized by: Colletta Lain, PA-C     Indications / Diagnosis:  Palpitations  ECG reviewed by me, the ED Provider: yes    Patient location:  ED  Rate:     ECG rate:  100    ECG rate assessment: normal    Rhythm:     Rhythm: sinus rhythm    Ectopy:     Ectopy: none    QRS:     QRS axis:  Normal  Conduction:     Conduction: normal    ST segments:     ST segments:  Normal  T waves:     T waves: non-specific               ED Course  ED Course as of 02/25/23 1602   Sat Feb 25, 2023   1035 Patient refused CXR    1313 Delta troponin is 16  Per protocol, a message was sent to [facility] to discuss  Patient requesting to leave and does not want to wait for this discussion  She remains asymptomatic and would like to f/u outpatient with cardiology  SBIRT 20yo+    Flowsheet Row Most Recent Value   SBIRT (25 yo +)    In order to provide better care to our patients, we are screening all of our patients for alcohol and drug use  Would it be okay to ask you these screening questions? Yes Filed at: 02/25/2023 0503   Initial Alcohol Screen: US AUDIT-C     1  How often do you have a drink containing alcohol? 0 Filed at: 02/25/2023 0946   2  How many drinks containing alcohol do you have on a typical day you are drinking? 0 Filed at: 02/25/2023 0946   3a  Male UNDER 65: How often do you have five or more drinks on one occasion? 0 Filed at: 02/25/2023 0946   3b  FEMALE Any Age, or MALE 65+: How often do you have 4 or more drinks on one occassion? 0 Filed at: 02/25/2023 0946   Audit-C Score 0 Filed at: 02/25/2023 4795   ONOFRE: How many times in the past year have you        Used an illegal drug or used a prescription medication for non-medical reasons? Never Filed at: 02/25/2023 [address] presenting for palpitations x 2 hours  Felt like heart was racing  Associated with dyspnea  Hx of recurrent SVT and this felt identical  Symptoms now resolved  Heart rate documented as 112 in triage although HR in  range on initial exam  Remainder of vitals stable  She is well appearing in no distress  Differential diagnosis includes but is not limited: arrhythmia, ACS, electrolyte abnormality, thyroid dysfunction    Initial ED plan: Check cardiac labs, TSH, EKG, and CXR  IV fluid bolus  Final assessment: Labs reveal a leukocytosis with a WBC of 12 which appears chronic  Remainder of labs unremarkable including normal electrolytes and TSH  EKG shows NSR with nonspecific T wave changes  Initial troponin 12  Repeat EKG performed at 2 hours unchanged  Repeat troponin increased to 28 (delta of 16)  Suspect this is 2/2 demand from episode of SVT  She has no chest pain, remains in normal sinus rhythm, and had a negative stress test last month  I attempted to discuss this with the internal medicine team but patient is requesting to leave  She is already dressed and asking for her IV to be removed  She states she does not want to wait in the ED any longer as she is asymptomatic and wishes to f/u outpatient  She was advised to f/u with cardiology/electrophysiology  ED return precautions discussed  Patient discharged in stable condition  Palpitations: acute illness or injury  Amount and/or Complexity of Data Reviewed  Labs: ordered  ECG/medicine tests: ordered and independent interpretation performed            Disposition  Final diagnoses:   Palpitations     Time reflects when diagnosis was documented in both MDM as applicable and the Disposition within this note     Time User Action Codes Description Comment    2/25/2023  1:12 PM [address] [place] [address] [facility] [R00 2] Palpitations       ED Disposition     ED Disposition   Discharge    Condition   Stable    Date/Time   Sat Feb 25, 2023  1:12 PM    Comment   Renetta Layton discharge to home/self care  Follow-up Information     Follow up With Specialties Details Why Contact Info Additional [address] Os[address] Palomares DO Cardiology Schedule an appointment as soon as possible for a visit   [address] [facility] [id number] [place] [address] Emergency Department Emergency Medicine  If symptoms worsen [address] [facility] [address] [facility] Emergency Department, [address] [place] [address], [facility]          Discharge Medication List as of 2/25/2023  2:23 PM      CONTINUE these medications which have NOT CHANGED    Details   clonazePAM (KlonoPIN) 1 mg tablet Take 0 5 mg by mouth 3 (three) times a day, Historical Med      CLONIDINE-CHLORTHALIDONE PO Take by mouth 2 (two) times a day, Historical Med      QUEtiapine Fumarate (SEROQUEL PO) Take by mouth, Historical Med      Sertraline HCl (ZOLOFT PO) Take by mouth once, Historical Med             No discharge procedures on file      PDMP Review     None          ED Provider  Electronically Signed by           Erasmo Burgos PA-C  02/25/23 9266

## 2023-02-26 LAB
ATRIAL RATE: 100 BPM
P AXIS: 47 DEGREES
PR INTERVAL: 140 MS
QRS AXIS: 29 DEGREES
QRSD INTERVAL: 68 MS
QT INTERVAL: 340 MS
QTC INTERVAL: 438 MS
T WAVE AXIS: 7 DEGREES
VENTRICULAR RATE: 100 BPM

## 2023-02-27 LAB
ATRIAL RATE: 68 BPM
P AXIS: 21 DEGREES
PR INTERVAL: 154 MS
QRS AXIS: 51 DEGREES
QRSD INTERVAL: 72 MS
QT INTERVAL: 390 MS
QTC INTERVAL: 414 MS
T WAVE AXIS: 28 DEGREES
VENTRICULAR RATE: 68 BPM

## 2023-03-01 ENCOUNTER — TELEPHONE (OUTPATIENT)
Dept: CARDIOLOGY CLINIC | Facility: CLINIC | Age: 63
End: 2023-03-01

## 2023-03-01 DIAGNOSIS — I47.1 SVT (SUPRAVENTRICULAR TACHYCARDIA): Primary | ICD-10-CM

## 2023-03-01 RX ORDER — DILTIAZEM HYDROCHLORIDE 120 MG/1
120 TABLET, FILM COATED ORAL DAILY
Qty: 30 TABLET | Refills: 3 | Status: SHIPPED | OUTPATIENT
Start: 2023-03-01 | End: 2023-05-30

## 2023-03-01 NOTE — TELEPHONE ENCOUNTER
I reviewed Dr Vera Marina note from before  I sent a prescription for Cardizem 120 mg daily  Patient has an appointment with Dr Donta Campbell 16th of this month  She should have her SVT ablation done  To discuss further with Dr Donta Campbell regarding the same

## 2023-03-01 NOTE — TELEPHONE ENCOUNTER
Patient is having episodes of SVT and is having episodes of Anxiety  She would like to know if there is any medication that can be prescribed to her for the SVT  She is no longer taking Clonidine       [phone]

## 2023-03-02 NOTE — TELEPHONE ENCOUNTER
Pt returned vm received by MS CORT[facility] and would like a call back  Gita March

## 2023-03-02 NOTE — TELEPHONE ENCOUNTER
Spoke with patient, advised on new medication sent in to pharmacy per Dr Breonna Bull  Advised patient on speaking with Dr Monika Alexander about SVT ablation      Patient confirmed and understood instructions

## 2023-03-16 ENCOUNTER — CONSULT (OUTPATIENT)
Dept: CARDIOLOGY CLINIC | Facility: CLINIC | Age: 63
End: 2023-03-16

## 2023-03-16 VITALS
BODY MASS INDEX: 27.71 KG/M2 | HEART RATE: 71 BPM | WEIGHT: 182.8 LBS | SYSTOLIC BLOOD PRESSURE: 96 MMHG | HEIGHT: 68 IN | DIASTOLIC BLOOD PRESSURE: 72 MMHG

## 2023-03-16 DIAGNOSIS — I47.1 SVT (SUPRAVENTRICULAR TACHYCARDIA) (HCC): Primary | ICD-10-CM

## 2023-03-16 NOTE — PROGRESS NOTES
HEART AND VASCULAR  CARDIAC ELECTROPHYSIOLOGY   HEART RHYTHM CENTER  [facility]    Outpatient New Consult    Today's Date: 03/16/23        Patient name: Renetta Layton  YOB: 1960  Sex: female         Chief Complaint: Referral for SVT      ASSESSMENT:  Problem List Items Addressed This Visit    None  Visit Diagnoses     SVT (supraventricular tachycardia) ([address] )    -  Primary    Relevant Orders    POCT ECG        66 yo female  1) SVT narrow complex short RP 171bpm, retrograde P wave immediately after QRS  She has had 30 years but now having frequently, 3 ER visits, sometimes breaks w valsalva  Taking diltiazem but still having episodes  Normal EF, neg stress      PLAN:  1  Recommend SVT ablation, risks and benefits discussed including but not limited to bleeding around heart and pacemaker, estimate 95% success rate  2  HOld dilt 48 hour prior to ablation and can remain off after  Orders Placed This Encounter   Procedures   • POCT ECG     Medications Discontinued During This Encounter   Medication Reason   • CLONIDINE-CHLORTHALIDONE PO Therapy completed             HPI/Subjective:   66 yo female w SVT, has had for 30 years, she feels heart racing out of her dennis, palpitations, sometimes breaks w valsalva  She is trying diltiazem but had it as recently as last night  Seeing DR Esau Hernandez who referred for ablation  Please note HPI is listed by problem with with update following it, it is copied again in the assessment above and reflects medical decision making as well  Complete 12 point ROS reviewed and otherwise non pertinent or negative except as per HPI pertinent positives in Cardiovascular and Respiratory emphasized  Please see paper chart for outpatient clinic patients where the patient completed the 12 point ROS survey             Past Medical History:   Diagnosis Date • Anxiety    • SVT (supraventricular tachycardia) (HCC)        No Known Allergies  I reviewed the Home Medication list and Allergies in the chart  Scheduled Meds:  Current Outpatient Medications   Medication Sig Dispense Refill   • clonazePAM (KlonoPIN) 1 mg tablet Take 0 5 mg by mouth 3 (three) times a day     • diltiazem (CARDIZEM) 120 MG tablet Take 1 tablet (120 mg total) by mouth in the morning 30 tablet 3   • QUEtiapine Fumarate (SEROQUEL PO) Take by mouth     • Sertraline HCl (ZOLOFT PO) Take by mouth once       No current facility-administered medications for this visit  PRN Meds:  History reviewed  No pertinent family history      Social History     Socioeconomic History   • Marital status: /Civil Union     Spouse name: Not on file   • Number of children: Not on file   • Years of education: Not on file   • Highest education level: Not on file   Occupational History   • Not on file   Tobacco Use   • Smoking status: Every Day     Packs/day: 1 00     Types: Cigarettes   • Smokeless tobacco: Never   Substance and Sexual Activity   • Alcohol use: Yes     Comment: occ   • Drug use: Yes     Types: Marijuana     Comment: medical marijuana   • Sexual activity: Not Currently     Partners: Male   Other Topics Concern   • Not on file   Social History Narrative   • Not on file     Social Determinants of Health     Financial Resource Strain: Not on file   Food Insecurity: Not on file   Transportation Needs: Not on file   Physical Activity: Not on file   Stress: Not on file   Social Connections: Not on file   Intimate Partner Violence: Not on file   Housing Stability: Not on file         OBJECTIVE:    BP 96/72 (BP Location: Left arm, Patient Position: Sitting, Cuff Size: Large)   Pulse 71   Ht 5' 8" (1 727 m)   Wt 82 9 kg (182 lb 12 8 oz)   BMI 27 79 kg/m²   Vitals:    03/16/23 1406   Weight: 82 9 kg (182 lb 12 8 oz)     GEN: No acute distress, Alert and oriented, well appearing  HEENT:External ears normal, oral pharynx clear, mucous membranes moist  EYES: Pupils equal, sclera anicteric, midline, normal conjuctiva  NECK: No JVD, supple, no obvious masses or thryomegaly or goiter  CARDIOVASCULAR:  RRR, LUNGS: normal effort, no wheezing  ABDOMEN:  nondistended,  without obvious organomegaly or ascites  EXTREMITIES/VASCULAR:  No edema  warm an well perfused  PSYCH: Normal Affect,  linear speech pattern without evidence of psychosis  NEURO: Grossly intact, moving all extremiteis equal, face symmetric, alert and responsive, no obvious focal defecits   GAIT:  Ambulates normally without difficulty  HEME: No bleeding, bruising, petechia, purpura   SKIN: No significant rashes on visibile skin, warm, no diaphoresis or pallor  Lab Results:       LABS:      Chemistry        Component Value Date/Time    K 4 0 02/25/2023 1002     02/25/2023 1002    CO2 26 02/25/2023 1002    BUN 13 02/25/2023 1002    CREATININE 0 86 02/25/2023 1002        Component Value Date/Time    CALCIUM 9 5 02/25/2023 1002    ALKPHOS 79 02/25/2023 1002    AST 14 02/25/2023 1002    ALT 12 02/25/2023 1002            No results found for: CHOL  No results found for: HDL  No results found for: LDLCALC  No results found for: TRIG  No results found for: CHOLHDL    IMAGING: No results found  Cardiac testing:   No results found for this or any previous visit  No results found for this or any previous visit  No results found for this or any previous visit  No results found for this or any previous visit            I reviewed and interpreted the following LABS/EKG/TELE/IMAGING and below is summary of my interpretation (if data available):

## 2023-03-20 ENCOUNTER — TELEPHONE (OUTPATIENT)
Dept: CARDIOLOGY CLINIC | Facility: CLINIC | Age: 63
End: 2023-03-20

## 2023-04-06 ENCOUNTER — PREP FOR PROCEDURE (OUTPATIENT)
Dept: CARDIOLOGY CLINIC | Facility: CLINIC | Age: 63
End: 2023-04-06

## 2023-04-06 DIAGNOSIS — I47.1 SVT (SUPRAVENTRICULAR TACHYCARDIA) (HCC): Primary | ICD-10-CM

## 2023-05-05 ENCOUNTER — APPOINTMENT (OUTPATIENT)
Dept: LAB | Facility: CLINIC | Age: 63
End: 2023-05-05

## 2023-05-05 LAB
BASOPHILS # BLD AUTO: 0.07 THOUSANDS/ΜL (ref 0–0.1)
BASOPHILS NFR BLD AUTO: 1 % (ref 0–1)
EOSINOPHIL # BLD AUTO: 0.2 THOUSAND/ΜL (ref 0–0.61)
EOSINOPHIL NFR BLD AUTO: 2 % (ref 0–6)
ERYTHROCYTE [DISTWIDTH] IN BLOOD BY AUTOMATED COUNT: 13.9 % (ref 11.6–15.1)
HCT VFR BLD AUTO: 41.5 % (ref 34.8–46.1)
HGB BLD-MCNC: 13.5 G/DL (ref 11.5–15.4)
IMM GRANULOCYTES # BLD AUTO: 0.05 THOUSAND/UL (ref 0–0.2)
IMM GRANULOCYTES NFR BLD AUTO: 0 % (ref 0–2)
LYMPHOCYTES # BLD AUTO: 2.35 THOUSANDS/ΜL (ref 0.6–4.47)
LYMPHOCYTES NFR BLD AUTO: 19 % (ref 14–44)
MCH RBC QN AUTO: 29 PG (ref 26.8–34.3)
MCHC RBC AUTO-ENTMCNC: 32.5 G/DL (ref 31.4–37.4)
MCV RBC AUTO: 89 FL (ref 82–98)
MONOCYTES # BLD AUTO: 0.73 THOUSAND/ΜL (ref 0.17–1.22)
MONOCYTES NFR BLD AUTO: 6 % (ref 4–12)
NEUTROPHILS # BLD AUTO: 9.08 THOUSANDS/ΜL (ref 1.85–7.62)
NEUTS SEG NFR BLD AUTO: 72 % (ref 43–75)
NRBC BLD AUTO-RTO: 0 /100 WBCS
PLATELET # BLD AUTO: 242 THOUSANDS/UL (ref 149–390)
PMV BLD AUTO: 11 FL (ref 8.9–12.7)
RBC # BLD AUTO: 4.66 MILLION/UL (ref 3.81–5.12)
WBC # BLD AUTO: 12.48 THOUSAND/UL (ref 4.31–10.16)

## 2023-05-06 LAB
ALBUMIN SERPL BCP-MCNC: 3.3 G/DL (ref 3.5–5)
ALP SERPL-CCNC: 93 U/L (ref 46–116)
ALT SERPL W P-5'-P-CCNC: 23 U/L (ref 12–78)
ANION GAP SERPL CALCULATED.3IONS-SCNC: 1 MMOL/L (ref 4–13)
AST SERPL W P-5'-P-CCNC: 16 U/L (ref 5–45)
BILIRUB SERPL-MCNC: 0.21 MG/DL (ref 0.2–1)
BUN SERPL-MCNC: 12 MG/DL (ref 5–25)
CALCIUM ALBUM COR SERPL-MCNC: 10.1 MG/DL (ref 8.3–10.1)
CALCIUM SERPL-MCNC: 9.5 MG/DL (ref 8.3–10.1)
CHLORIDE SERPL-SCNC: 110 MMOL/L (ref 96–108)
CO2 SERPL-SCNC: 26 MMOL/L (ref 21–32)
CREAT SERPL-MCNC: 0.84 MG/DL (ref 0.6–1.3)
GFR SERPL CREATININE-BSD FRML MDRD: 74 ML/MIN/1.73SQ M
GLUCOSE P FAST SERPL-MCNC: 116 MG/DL (ref 65–99)
POTASSIUM SERPL-SCNC: 4.5 MMOL/L (ref 3.5–5.3)
PROT SERPL-MCNC: 7.3 G/DL (ref 6.4–8.4)
SODIUM SERPL-SCNC: 137 MMOL/L (ref 135–147)

## 2023-05-29 NOTE — H&P
H&P Exam - Electrophysiology  Minnie Zabala 59 y o  female MRN: [id number]  Unit/Bed#:  Encounter: [id number]    Assessment/Plan     Assessment:  1  Symptomatic SVT  - short RP tachycardia that had responded to Valsalva and broke with adenosine  - utilizing diltiazem with breakthrough  - EF of 55% per echo 1/2023 / nuclear stress test 2/2023 showing no evidence of ischemia  2  Prior right hip replacement  3  Anxiety disorder  - maintained on seroquel and sertraline as an outptatient    Plan:  1  Patient presents today to undergo EPS/SVT ablation  History of Present Illness   HPI:  Minnie Zabala is a 59y o  year old female with SVT and anxiety disorder  She establish care with [facility], Dr Martina Luo, in November 2020 given her SVT  She had been reporting increasing frequency of her SVT episodes and that vagal maneuvers did not work as well  She did require ED visit and receiving adenosine to break this  He had been offered ablation in her 35s but had not wanted to do anything invasive at that time  She did complain of shortness of breath, this was followed up with nuclear stress test that showed no evidence of ischemia  He was also offered Cardizem to treat SVT until she she could be seen by EP  She was finally seen by Dr Chula Sharma on 3/16 at which point ablation was recommended and she was agreeable  She presents today to undergo this procedure  She reports that cardizem has helped her not have further episodes since seeing Dr Chula Sharma  EKG:     Review of Systems  ROS as noted above, otherwise 12 point review of systems was performed and is negative  Historical Information   Past Medical History:   Diagnosis Date   • Anxiety    • SVT (supraventricular tachycardia) (HCC)      Past Surgical History:   Procedure Laterality Date   • JOINT REPLACEMENT      right hip     Family History: No family history on file      Social History   Social History     Substance and Sexual Activity "  Alcohol Use Yes    Comment: occ     Social History     Substance and Sexual Activity   Drug Use Yes   • Types: Marijuana    Comment: medical marijuana     Social History     Tobacco Use   Smoking Status Every Day   • Packs/day: 1 00   • Types: Cigarettes   Smokeless Tobacco Never       Meds/Allergies   all medications and allergies reviewed  Home Medications:   No medications prior to admission  No Known Allergies    Objective   Vitals: There were no vitals taken for this visit  No intake or output data in the 24 hours ending 05/29/23 0825    Invasive Devices     Peripheral Intravenous Line  Duration           Peripheral IV 02/25/23 Right Antecubital 92 days                Physical Exam   GEN: NAD, alert and oriented, well appearing  SKIN: dry without significant lesions or rashes  HEENT: NCAT, PERRL, EOMs intact  NECK: No JVD or carotid bruits appreciated  CARDIOVASCULAR: RRR, normal S1, S2 without murmurs, rubs, or gallops appreciated  LUNGS: Clear to auscultation bilaterally without wheezes, rhonchi, or rales  ABDOMEN: Soft, nontender, nondistended  EXTREMITIES/VASCULAR: perfused without clubbing, cyanosis, or edema b/l  PSYCH: Normal mood and affect  NEURO: CN ll-Xll grossly intact    Lab Results: I have personally reviewed pertinent lab results  Invalid input(s): \"LABGLOM\"              Imaging: I have personally reviewed pertinent reports  No results found for this or any previous visit  Code Status: No Order    ** Please Note: Dictation voice to text software may have been used in the creation of this document   **    "

## 2023-05-30 ENCOUNTER — ANESTHESIA (OUTPATIENT)
Dept: NON INVASIVE DIAGNOSTICS | Facility: HOSPITAL | Age: 63
End: 2023-05-30

## 2023-05-30 ENCOUNTER — HOSPITAL ENCOUNTER (OUTPATIENT)
Facility: HOSPITAL | Age: 63
Setting detail: OUTPATIENT SURGERY
Discharge: HOME | End: 2023-05-30
Attending: INTERNAL MEDICINE | Admitting: INTERNAL MEDICINE

## 2023-05-30 ENCOUNTER — ANESTHESIA EVENT (OUTPATIENT)
Dept: NON INVASIVE DIAGNOSTICS | Facility: HOSPITAL | Age: 63
End: 2023-05-30

## 2023-05-30 VITALS
HEART RATE: 81 BPM | SYSTOLIC BLOOD PRESSURE: 100 MMHG | DIASTOLIC BLOOD PRESSURE: 54 MMHG | WEIGHT: 175 LBS | BODY MASS INDEX: 27.47 KG/M2 | OXYGEN SATURATION: 91 % | RESPIRATION RATE: 17 BRPM | HEIGHT: 67 IN | TEMPERATURE: 98.1 F

## 2023-05-30 DIAGNOSIS — I47.1 SVT (SUPRAVENTRICULAR TACHYCARDIA) (HCC): ICD-10-CM

## 2023-05-30 LAB
ANION GAP SERPL CALCULATED.3IONS-SCNC: 0 MMOL/L (ref 4–13)
ATRIAL RATE: 81 BPM
BUN SERPL-MCNC: 13 MG/DL (ref 5–25)
CALCIUM SERPL-MCNC: 8.8 MG/DL (ref 8.3–10.1)
CHLORIDE SERPL-SCNC: 112 MMOL/L (ref 96–108)
CO2 SERPL-SCNC: 26 MMOL/L (ref 21–32)
CREAT SERPL-MCNC: 0.87 MG/DL (ref 0.6–1.3)
ERYTHROCYTE [DISTWIDTH] IN BLOOD BY AUTOMATED COUNT: 14 % (ref 11.6–15.1)
GFR SERPL CREATININE-BSD FRML MDRD: 71 ML/MIN/1.73SQ M
GLUCOSE P FAST SERPL-MCNC: 118 MG/DL (ref 65–99)
GLUCOSE SERPL-MCNC: 118 MG/DL (ref 65–140)
HCT VFR BLD AUTO: 39.8 % (ref 34.8–46.1)
HGB BLD-MCNC: 13.3 G/DL (ref 11.5–15.4)
INR PPP: 0.93 (ref 0.84–1.19)
MCH RBC QN AUTO: 29 PG (ref 26.8–34.3)
MCHC RBC AUTO-ENTMCNC: 33.4 G/DL (ref 31.4–37.4)
MCV RBC AUTO: 87 FL (ref 82–98)
P AXIS: 15 DEGREES
PLATELET # BLD AUTO: 258 THOUSANDS/UL (ref 149–390)
PMV BLD AUTO: 9.6 FL (ref 8.9–12.7)
POTASSIUM SERPL-SCNC: 4.2 MMOL/L (ref 3.5–5.3)
PR INTERVAL: 152 MS
PROTHROMBIN TIME: 12.6 SECONDS (ref 11.6–14.5)
QRS AXIS: 25 DEGREES
QRSD INTERVAL: 80 MS
QT INTERVAL: 376 MS
QTC INTERVAL: 436 MS
RBC # BLD AUTO: 4.59 MILLION/UL (ref 3.81–5.12)
SODIUM SERPL-SCNC: 138 MMOL/L (ref 135–147)
T WAVE AXIS: 20 DEGREES
VENTRICULAR RATE: 81 BPM
WBC # BLD AUTO: 10.33 THOUSAND/UL (ref 4.31–10.16)

## 2023-05-30 RX ORDER — PANTOPRAZOLE SODIUM 40 MG/10ML
40 INJECTION, POWDER, LYOPHILIZED, FOR SOLUTION INTRAVENOUS ONCE
Status: DISCONTINUED | OUTPATIENT
Start: 2023-05-30 | End: 2023-05-30 | Stop reason: HOSPADM

## 2023-05-30 RX ORDER — FENTANYL CITRATE 50 UG/ML
INJECTION, SOLUTION INTRAMUSCULAR; INTRAVENOUS AS NEEDED
Status: DISCONTINUED | OUTPATIENT
Start: 2023-05-30 | End: 2023-05-30

## 2023-05-30 RX ORDER — SODIUM CHLORIDE 9 MG/ML
INJECTION, SOLUTION INTRAVENOUS CONTINUOUS PRN
Status: DISCONTINUED | OUTPATIENT
Start: 2023-05-30 | End: 2023-05-30

## 2023-05-30 RX ORDER — LIDOCAINE HYDROCHLORIDE 10 MG/ML
INJECTION, SOLUTION EPIDURAL; INFILTRATION; INTRACAUDAL; PERINEURAL CODE/TRAUMA/SEDATION MEDICATION
Status: DISCONTINUED | OUTPATIENT
Start: 2023-05-30 | End: 2023-05-30 | Stop reason: HOSPADM

## 2023-05-30 RX ORDER — CEFAZOLIN SODIUM 1 G/3ML
INJECTION, POWDER, FOR SOLUTION INTRAMUSCULAR; INTRAVENOUS AS NEEDED
Status: DISCONTINUED | OUTPATIENT
Start: 2023-05-30 | End: 2023-05-30

## 2023-05-30 RX ORDER — ACETAMINOPHEN 325 MG/1
650 TABLET ORAL EVERY 4 HOURS PRN
Status: DISCONTINUED | OUTPATIENT
Start: 2023-05-30 | End: 2023-05-30 | Stop reason: HOSPADM

## 2023-05-30 RX ORDER — HEPARIN SODIUM 1000 [USP'U]/ML
INJECTION, SOLUTION INTRAVENOUS; SUBCUTANEOUS CODE/TRAUMA/SEDATION MEDICATION
Status: DISCONTINUED | OUTPATIENT
Start: 2023-05-30 | End: 2023-05-30 | Stop reason: HOSPADM

## 2023-05-30 RX ORDER — MIDAZOLAM HYDROCHLORIDE 2 MG/2ML
INJECTION, SOLUTION INTRAMUSCULAR; INTRAVENOUS AS NEEDED
Status: DISCONTINUED | OUTPATIENT
Start: 2023-05-30 | End: 2023-05-30

## 2023-05-30 RX ORDER — OXYCODONE HYDROCHLORIDE 5 MG/1
5 TABLET ORAL EVERY 4 HOURS PRN
Status: DISCONTINUED | OUTPATIENT
Start: 2023-05-30 | End: 2023-05-30 | Stop reason: HOSPADM

## 2023-05-30 RX ADMIN — FENTANYL CITRATE 25 MCG: 50 INJECTION INTRAMUSCULAR; INTRAVENOUS at 10:25

## 2023-05-30 RX ADMIN — MIDAZOLAM 1 MG: 1 INJECTION INTRAMUSCULAR; INTRAVENOUS at 09:09

## 2023-05-30 RX ADMIN — CEFAZOLIN 2000 MG: 1 INJECTION, POWDER, FOR SOLUTION INTRAMUSCULAR; INTRAVENOUS at 09:04

## 2023-05-30 RX ADMIN — SODIUM CHLORIDE: 0.9 INJECTION, SOLUTION INTRAVENOUS at 08:12

## 2023-05-30 RX ADMIN — FENTANYL CITRATE 25 MCG: 50 INJECTION INTRAMUSCULAR; INTRAVENOUS at 09:53

## 2023-05-30 RX ADMIN — MIDAZOLAM 1 MG: 1 INJECTION INTRAMUSCULAR; INTRAVENOUS at 08:12

## 2023-05-30 RX ADMIN — FENTANYL CITRATE 25 MCG: 50 INJECTION INTRAMUSCULAR; INTRAVENOUS at 09:59

## 2023-05-30 RX ADMIN — OXYCODONE HYDROCHLORIDE 5 MG: 5 TABLET ORAL at 11:28

## 2023-05-30 RX ADMIN — FENTANYL CITRATE 25 MCG: 50 INJECTION INTRAMUSCULAR; INTRAVENOUS at 10:31

## 2023-05-30 RX ADMIN — FENTANYL CITRATE 25 MCG: 50 INJECTION INTRAMUSCULAR; INTRAVENOUS at 08:30

## 2023-05-30 RX ADMIN — FENTANYL CITRATE 25 MCG: 50 INJECTION INTRAMUSCULAR; INTRAVENOUS at 08:22

## 2023-05-30 RX ADMIN — MIDAZOLAM 1 MG: 1 INJECTION INTRAMUSCULAR; INTRAVENOUS at 08:22

## 2023-05-30 RX ADMIN — FENTANYL CITRATE 25 MCG: 50 INJECTION INTRAMUSCULAR; INTRAVENOUS at 09:51

## 2023-05-30 RX ADMIN — FENTANYL CITRATE 50 MCG: 50 INJECTION INTRAMUSCULAR; INTRAVENOUS at 08:48

## 2023-05-30 RX ADMIN — MIDAZOLAM 1 MG: 1 INJECTION INTRAMUSCULAR; INTRAVENOUS at 09:29

## 2023-05-30 RX ADMIN — FENTANYL CITRATE 25 MCG: 50 INJECTION INTRAMUSCULAR; INTRAVENOUS at 09:27

## 2023-05-30 NOTE — ANESTHESIA PREPROCEDURE EVALUATION
Procedure:  Cardiac eps/svt ablation (Chest)    Relevant Problems   CARDIO   (+) SVT (supraventricular tachycardia) (HCC)      NEURO/PSYCH   (+) Anxiety      PULMONARY   (+) Smoking      Other   (+) Marijuana use        Physical Exam    Airway    Mallampati score: III  TM Distance: >3 FB  Neck ROM: full     Dental       Cardiovascular      Pulmonary      Other Findings        Anesthesia Plan  ASA Score- 3     Anesthesia Type- IV sedation with anesthesia with ASA Monitors  Additional Monitors:   Airway Plan:           Plan Factors-    Chart reviewed  EKG reviewed  Existing labs reviewed  Patient summary reviewed  Induction- intravenous  Postoperative Plan-     Informed Consent- Anesthetic plan and risks discussed with patient  I personally reviewed this patient with the CRNA  Discussed and agreed on the Anesthesia Plan with the CRNA  Taurus Bautista

## 2023-05-30 NOTE — ANESTHESIA POSTPROCEDURE EVALUATION
Post-Op Assessment Note    CV Status:  Stable  Pain Score: 0    Pain management: adequate     Mental Status:  Alert   Hydration Status:  Euvolemic   PONV Controlled:  None   Airway Patency:  Patent      Post Op Vitals Reviewed: Yes      Staff: CRNA         There were no known notable events for this encounter      BP   91/56   Temp      Pulse  88   Resp   16   SpO2   95

## 2023-05-30 NOTE — DISCHARGE INSTR - AVS FIRST PAGE
MEDICATION INSTRUCTIONS/CHANGES:  Please STOP taking diltiazem (cardiazem)  RESTRICTIONS:   No heavy lifting (more than 5-10 pounds) or strenuous activity for one week  No soaking in a bath tub/hot tub/swimming pool for one week or until groin heals  You may shower - please let soap and water run over the groins, no scrubbing, and pat the area dry  Please place band-aid on groins daily for up to five days, but you may remove sooner if no issues are noted  If you notice ongoing bleeding, swelling, or firm lumps in groin near ablation incision, please contact Dr Sara Grady' office - [phone]  If you have any significant issues after hours or on the weekends, please call the on call cardiology number at [phone]

## 2023-05-30 NOTE — LETTER
[address] [place] [address],[place] [address] CATH LAB  [place] [address]  Dept: [phone]    June 2, 2023     Patient: Olivier Sutherland   YOB: 1960   Date of Visit: 5/30/2023       To Whom it May Concern:    Olivier Sutherland is currently under my professional care and recently underwent a procedure  She was seen in the hospital on 5/30/2023  She will be able to return to work on Saturday, 6/3/2023 with the following restrictions: No lifting more than 5 to 10 pounds, no overhead reaching, squatting/straining, or overexertion; standing and walking is permitted  As of Tuesday, 6/6/2023, she may return to work without limitations or restrictions  If you have any questions or concerns, please do not hesitate to contact our office - [phone]           Sincerely,          [facility] - Cardiac Electrophysiology

## 2023-05-31 LAB
ATRIAL RATE: 83 BPM
P AXIS: 35 DEGREES
PR INTERVAL: 148 MS
QRS AXIS: 46 DEGREES
QRSD INTERVAL: 70 MS
QT INTERVAL: 374 MS
QTC INTERVAL: 439 MS
T WAVE AXIS: 37 DEGREES
VENTRICULAR RATE: 83 BPM

## 2023-06-25 ENCOUNTER — SOCIAL WORK (OUTPATIENT)
Dept: BEHAVIORAL/MENTAL HEALTH CLINIC | Facility: CLINIC | Age: 63
End: 2023-06-25

## 2023-06-25 DIAGNOSIS — F41.9 ANXIETY DISORDER, UNSPECIFIED TYPE: ICD-10-CM

## 2023-06-25 DIAGNOSIS — F33.1 MODERATE EPISODE OF RECURRENT MAJOR DEPRESSIVE DISORDER (HCC): Primary | ICD-10-CM

## 2023-06-25 NOTE — PROGRESS NOTES
[place] ambulatory encounter  CARDIOLOGY OFFICE VISIT    PCP: Vasquez Burgos MD     CHIEF COMPLAINT:    Chief Complaint   Patient presents with   • Office Visit     6 month follow up//dizziness has concerns frequent        PERTINENT HISTORY   coronary artery disease s/p CABG, left sided subclavian stenosis, carotid artery disease, hypertension, hyperlipidemia, peripheral artery disease and cardiomyopathy.    HISTORY OF PRESENT ILLNESS   Enriqueta Salgado is a 86 year old female with a past medical history of coronary artery disease s/p CABG, left sided subclavian stenosis, carotid artery disease, hypertension, hyperlipidemia, peripheral artery disease and cardiomyopathy.  The patient presents today for a 6 month follow up.      The patient presents today unaccompanied and reports doing well from a cardiac standpoint.  *** Recent testing and labs were reviewed with the patient during their appointment.  The patient is compliant with all cardiac medications and reports tolerating them well.  No reports of SOB, palpitations, edema, dizziness, or chest pain.  No further complaints at this time.    REVIEW OF SYSTEMS   {car ros:7314027}     HISTORIES   {History Links:3685121::\"I have reviewed the past medical history, family history, social history, medications and allergies listed in the medical record as obtained by my nursing staff and support staff and agree with their documentation.\"}    PHYSICAL EXAM   {car physical exam:8435660}    LABORATORY DATA   {car lab list:5277079}    DIAGNOSTIC IMAGING   {Diagnostics:8996325}    ASSESSMENT & PLAN   Subclavian Steal Syndrome:      Hypertension: Elevated in clinic, 156/72. On ramipril, metoprolol, amlodipine, hydrochlorothiazide.      Coronary Artery Disease s/p CABG in 2002 following MI. NM Stress from 11/2021 shows very mild sanaz-infarct ischemia. Denies angina or anginal equivalent. On aspirin, rosuvastatin, metoprolol, and nitroglycerin.      Hyperlipidemia: Most recent  Writer discussed inpatient treatment with the patient  She admitted that she does experience suicidal ideations, but has no intent  She stated that, when she speaks with her , she feels much better  Partial and outpatient treatment were discussed  The patient would like to participate in partial at the [facility] only if it is not virtual   She would like to see a therapist if she is unable to participate in partial   Message will be sent to [facility]  The patient will be contacted in the near future regarding treatment  FLP reviewed from 09/2022. Chol 212, HDL 79, , TG 76. On rosuvastatin.     Cardiomyopathy: LVEF of 46% per most recent Echo from 08/2022. On metoprolol and ramipril.      Carotid Artery Disease: US Carotid from 10/2017 showed less than 50% stenosis of the right and left internal and external carotid arteries.      History of TIA/CVA in 2014: [place] to be cardio-embolic in origin. No residual deficits. Anticoagulated on warfarin.     Patient will No follow-ups on file. or sooner if problems arise.    Instructions provided as documented in the after visit summary.

## 2023-06-25 NOTE — PROGRESS NOTES
Assessment      Crisis Intake  Patient Intake  Special Needs: none  Living Arrangement: Lives with someone (with )  Can patient return home?: Yes  Address to be Discharge to[de-identified] see demographics  Patient's Telephone Number: see demographics  Type of Work: [employer]  Work History: Full-time  Patient History  Presenting Problems: The patient presented to the walk in center with her mohsen due to her depression and anxiety  The patient currently sees Dr Lina Prabhakar at the [facility], however, he has tried her on multiple medications for years without benefit  Dr Lina Prabhakar informed the patient that she was treatment resistant  The patient stated that she has been depressed and anxious for decades  She has some insight into her mental health, knowing that her constant worrying and fear interupts her daily life  The patient stated that she worries about 'everything'  She worries about becoming old and that her  may become sick  She stated that she is 'always waiting for the other shoe to drop'  The patient cited the death of her horse 2 months ago as one of her triggers  It was a traumatic experience for her, as she watched her horse be euthanized  Multiple people in the patient's life have either tried to or were successful with suicide  The patient denied having ever had suicidal thoughts until recently  She has no history of inpatient admits  The patient reported that she has occassionally thought about suicide, but has no intent  The suicidal thoughts stem from her fear that she will never get better  The patient felt safe to be discharged home, as did her   He is her main support and listens when she feels depressed  She denied having any other supports  She has tried therapy in the past, but felt that it was never effective  She expressed a desire to see a therapist at this time, however  The patient stated that she will do anything needed in order to get better    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 when she could no lojnger handle the anxiety she was experiencing  She has since obtained a  job  She feels better at work often, as she keeps herself busy  The patient denied homicidal ideations  No psychosis was present  Treatment History: outpatient treatment  Currently in Treatment: Yes  Current Psychiatrist/Therapist: Dr Merissa Espinal at [place] [address]: none  Medical Problems: see H&P  Legal Issues: none  Substance Abuse: Yes (See [address] [place] [address] Street History section for detail) (Pt smokes marijuana by hx)  Mental Status Exam  Orientation Level: Appropriate for age, Oriented X4  Affect: Flat  Speech: Logical/coherent  Mood: Anxious, Depressed, Despairing  Thought Content: Appropriate  Hallucination Type: No problems reported or observed  Judgement: Good  Impulse Control: Good  Attention Span: Appropriate for age  Memory: Intact  Appetite: Fair  Sleep: Good  Appear/Hygiene: Neatly Groomed  ADL Comments: no problems with ADLs  Strengths and Limitations  Patient Limitations: Poor past treatment response  Ideations  Current Self Harm/Suicidal Ideation: No  Previous Self Harm/Suicidal Ideation: Yes  Description of self harming behaviors or thoughts[de-identified] Thoughts to overdose on 's insulin  Violence Risk to Self: Yes- Within the past 6 months  Previous Plans to Harm Another Person: No  Violence Risk to Others: Denies within past 6 months  Previous History of Violence to Others: No  Provisional Diagnosis  Axis I: MDD, recurrent, moderate, without psychosis  Axis II: def  [place] III: see H&P  Intake Assessment Outcome  Patient Plan: Outpatient    C-SSRS  [place] Suicide Severity Rating Scale  C-SSRS Q1   Wish to be Dead: Yes - In the Past Month  C-SSRS Q2  Suicidal Thoughts: Yes - In the Past Month  C-SSRS Q3  Suicidal Thoughts with Method (without Specific Plan or Intent to Act): Yes - In the Past Month  C-SSRS Q4  Suicidal Intent (without Specific Plan): No - In the Past Month  C-SSRS Q5  Suicide Intent with Specific Plan: No - In the Past Month  C-SSRS Q6  Suicide Behavior Question: Yes  *Risk Level*: Moderate Risk      Patient was referred by: Other PCP    Visit start and stop times:    06/25/23  Start Time: 1045        Discharge and Safety    This writer discussed the patients current presentation and recommended discharge plan with the patient and her    They agree with the patient being discharged at this time  This writer and the patient completed a safety plan   The patient was provided with a copy of their safety plan with encouragement to utilize the plan following discharge  In addition, the patient was instructed to call [facility] crisis, other crisis services, 911 or to go to the nearest ER immediately if their situation changes at any time       This writer discussed discharge plans with the patient and family who agrees with and understands the discharge plans      SAFETY PLAN  Warning Signs (thoughts, images, mood, behavior, situations) of a potential crisis: thoughts about her horse, isolation      Coping Skills (what can I do to take my mind off the problem, or to keep myself safe): take a walk, talk to       Outside Support (who can I reach out to for support and help):         National Suicide Prevention Hotline:  8-243.353.9682    [facility] [address] [facility] [address]: [facility]: [address] [facility] [address] [facility] [address] [facility] [phone] - Crisis   [phone] - Peer Support Talk Line (1-9pm daily)  [phone] - Teen Support Talk Line (1-9pm daily)  [address] [facility] [address]  [facility] [phone] - [address] [facility]

## 2023-06-26 ENCOUNTER — DOCUMENTATION (OUTPATIENT)
Dept: BEHAVIORAL/MENTAL HEALTH CLINIC | Facility: CLINIC | Age: 63
End: 2023-06-26

## 2023-06-26 ENCOUNTER — TELEPHONE (OUTPATIENT)
Dept: BEHAVIORAL/MENTAL HEALTH CLINIC | Facility: CLINIC | Age: 63
End: 2023-06-26

## 2023-06-26 NOTE — PROGRESS NOTES
[address]    Name and Date of Birth:  Christina Armendariz 59 y o  1960    Date of Referral: June 26, 2023    Presenting Symptoms and Stressors:      Symptoms:  depression  Stressors:  ongoing anxiety    Access to Weapons:  No    Smoking Status: Yes     Substance Use: Patient smoke marijuana by history       Suicidal Ideation: Yes, passive death wish    Homicidal Ideation: None    Depressed Mood: Yes    Melanie/Hypomania: None    Psychosis: None    Agitation: No    Appetite Changes: adequate appetite    Sleep Disturbance: normal sleep    Diagnoses:  1   Major Depressive Disorder, single, moderate    Current Psychiatrist or Therapist:    Psychiatrist: Ji Beaulieu   Therapist: None    Do they Require Ambulatory Assistance: No    Communication Assistance: declined     Legal Issues: None        Alexander Serrato

## 2023-06-26 NOTE — TELEPHONE ENCOUNTER
"Telephone call to PT following up from [facility] visit  PT reports feeling \"okay  \" PT is in agreement with partial referral through [address] [place] [address]  CM had two additional questions for PHP referral  PT denies access to firearms and does smoke  PT thanked CM for following up and denies needing anything additional at this time     "

## 2023-06-30 ENCOUNTER — TELEMEDICINE (OUTPATIENT)
Dept: PSYCHIATRY | Facility: CLINIC | Age: 63
End: 2023-06-30
Payer: COMMERCIAL

## 2023-06-30 ENCOUNTER — OFFICE VISIT (OUTPATIENT)
Dept: PSYCHOLOGY | Facility: CLINIC | Age: 63
End: 2023-06-30
Payer: COMMERCIAL

## 2023-06-30 DIAGNOSIS — F41.1 GAD (GENERALIZED ANXIETY DISORDER): ICD-10-CM

## 2023-06-30 DIAGNOSIS — F33.2 MDD (MAJOR DEPRESSIVE DISORDER), RECURRENT SEVERE, WITHOUT PSYCHOSIS (HCC): Primary | ICD-10-CM

## 2023-06-30 DIAGNOSIS — F41.0 PANIC DISORDER WITHOUT AGORAPHOBIA: ICD-10-CM

## 2023-06-30 DIAGNOSIS — F40.10 SOCIAL ANXIETY DISORDER: ICD-10-CM

## 2023-06-30 DIAGNOSIS — F33.2 MDD (MAJOR DEPRESSIVE DISORDER), RECURRENT SEVERE, WITHOUT PSYCHOSIS (HCC): Primary | Chronic | ICD-10-CM

## 2023-06-30 DIAGNOSIS — F45.22 BODY DYSMORPHIC DISORDER: ICD-10-CM

## 2023-06-30 PROCEDURE — G0410 GRP PSYCH PARTIAL HOSP 45-50: HCPCS

## 2023-06-30 PROCEDURE — G0176 OPPS/PHP;ACTIVITY THERAPY: HCPCS

## 2023-06-30 PROCEDURE — 90834 PSYTX W PT 45 MINUTES: CPT

## 2023-06-30 PROCEDURE — 90791 PSYCH DIAGNOSTIC EVALUATION: CPT

## 2023-06-30 RX ORDER — SERTRALINE HYDROCHLORIDE 100 MG/1
TABLET, FILM COATED ORAL
Start: 2023-06-30

## 2023-06-30 NOTE — PSYCH
Subjective:     Patient ID: Justin Arzate is a 59 y o  female  [facility] Clinical Progress Notes      Specialized Services Documentation  Therapist must complete separate progress note for each specific clinical activity in which the individual participated during the day  Group Psychotherapy     This group was facilitated virtually in a private office using HIPAA Compliant and Approved [employer] Teams  Justin Arzate  consented to the use of tele-video modality of treatment  ([id number])  Justin Arzate  actively shared in psychotherapy group exploring the weekly wellness assessment  Participants explored successes and challenges in wellness areas throughout this past week - physical, social, vocational, spiritual, cognitive, and emotional   Some positive progress toward goal noted  Continue psychotherapy to encourage self-awareness and home practice of skills to support wellness      Treatment Plan Objective: 1 1, 1 2, 1 3, 1 4    Therapist: Cecelia CRUZ RN and Alec CLEMENS
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
## 2023-06-30 NOTE — PSYCH
Visit Time    Visit Start Time: 0930  Visit Stop Time: 2337  Total Visit Duration: 45 minutes    Subjective:     Patient ID: Conrado Coughlin is a 59 y o  female  [facility] Clinical Progress Notes      Specialized Services Documentation  Therapist must complete separate progress note for each specific clinical activity in which the individual participated during the day  Other Pre-Authorization: Spoke with Lizzie Wick at [place] - phone [phone]  5 days authorized from 06/30/23  to 7/7/2023  Authorization number [id number]  Reviewer assigned DUSTY Wilkes [facility]     Case Management Note    Marguerite Wilkes [facility]    Current suicide risk : Low     This case management session was facilitated virtually in a private office using HIPAA Compliant and Approved Microsoft Teams  Conrado Coughlin consented to the use of tele-video modality of treatment  Met with Conrado Ced  via TEAMS  Reviewed program, initial paperwork reviewed: Consent for Treatment, PHP handbook, HIPPA, General Consent, Client Bill of Rights, and Smoking/Drug and Alcohol Policy  Release of Information obtained for emergency contact - Rebeca Jimenez and PCP and Health Care Coordination Form  Conrado Coughlin has hard copies of all paperwork and verbally gave consent  Reviewed and given on call number  PCP notified of admission and health care coordination form sent  Completed initial psycho-social evaluation and initial treatment goals discussed  I,Linette Davila,am physically unable to provide a signature; however, I understand the nature of and am in agreement with the documentation presented to me via TEAMS  I have received a copy through My Chart and/or the [facility] Postal Service  I freely give verbal consent  Name of Document (s):Consent for Treatment, PHP handbook, HIPPA, General Consent, Client Bill of Rights, and Smoking/Drug and Alcohol Policy   Release of Information obtained for emergency contact - spouse and PCP and Health Care Coordination Form  Witness to verbal consent: Kathe Butler  Witness to verbal consent: Kenyon Babinski      Medications changes/added/denied? Yes     Treatment session number: 1    Individual Case Management Visit provided today?  Yes     [facility] follow up physician's orders: see Dr Chuck Zarate, DO

## 2023-06-30 NOTE — PSYCH
Virtual Regular Visit    Verification of patient location:    Patient is located at Home in the following state in which I hold an active license [place]      Assessment/Plan:    Problem List Items Addressed This Visit        Other    MDD (major depressive disorder), recurrent severe, without psychosis ([facility] [address] ) - Primary (Chronic)    JACOBY (generalized anxiety disorder)    Panic disorder without agoraphobia    Social anxiety disorder       Goals addressed in session:           Reason for visit is PHP VIRTUAL GROUP DUE TO VIRTUAL PREFERENCE      Encounter provider [facility] PARTIAL [address]    Provider located at [address] [place] [address] Dr  [id number] [facility] [id number]      Recent Visits  No visits were found meeting these conditions  Showing recent visits within past 7 days and meeting all other requirements  Today's Visits  Date Type Provider Dept   06/30/23 Office Visit [facility] PARTIAL PSYCH GROUP THERAPY [facility]   Showing today's visits and meeting all other requirements  Future Appointments  No visits were found meeting these conditions  Showing future appointments within next 150 days and meeting all other requirements       The patient was identified by name and date of birth  Conrado Coughlin was informed that this is a telemedicine visit and that the visit is being conducted [employer]  She agrees to proceed     My office door was closed  No one else was in the room  She acknowledged consent and understanding of privacy and security of the video platform  The patient has agreed to participate and understands they can discontinue the visit at any time  Patient is aware this is a billable service  Subjective  Conrado Coughlin is a 59 y o  female         HPI     Past Medical History:   Diagnosis Date   • Anxiety    • SVT (supraventricular tachycardia) ([facility] [address] )        Past Surgical History: Procedure Laterality Date   • CARDIAC ELECTROPHYSIOLOGY PROCEDURE N/A 05/30/2023    Procedure: Cardiac eps/svt ablation;  Surgeon: Andrew Perry MD;  Location: [facility]; Service: Cardiology   • JOINT REPLACEMENT  2010    right hip       Current Outpatient Medications   Medication Sig Dispense Refill   • clonazePAM (KlonoPIN) 1 mg tablet Take 1 mg by mouth 3 (three) times a day     • QUEtiapine Fumarate (SEROQUEL PO) Take 50 mg by mouth 2 (two) times a day Was prescribed 100mg up to QID she reports, but dose not tolerated     • sertraline (Zoloft) 100 mg tablet Take 150mg daily, taper will be discussed with PHP provider       No current facility-administered medications for this visit  No Known Allergies    Review of Systems    Video Exam    There were no vitals filed for this visit  Physical Exam     Visit Time    I spent FOUR GROUP HOURS PLUS CASE MANAGEMENT minutes with patient today in which greater than 50% of the time was spent in counseling/coordination of care regarding PHP - SEE NOTES

## 2023-06-30 NOTE — PSYCH
"Visit Time    Visit Start Time: 0930  Visit Stop Time: 0000  Total Visit Duration: 45 minutes    Assessment/Plan:      Diagnoses and all orders for this visit:    MDD (major depressive disorder), recurrent severe, without psychosis ([address] )    JACOBY (generalized anxiety disorder)    Panic disorder without agoraphobia    Social anxiety disorder          Subjective:     Patient ID: Elaine Stevenson is a 59 y o  female  HPI:     Pre-morbid level of function and History of Present Illness:   Per Dr Kasey Bruno:   Elaine Stevenson is a 59 y o  female presents with significant anxiety, worrying about aging, getting sick, her  dying  He is a diabetic but takes care of himself  She has had anxiety for 30+ years  Had a therapist in the past, was in [place]  A psychiatrist as well, but now he is seeing Dr Merissa Espinal who tells her she is 'treatment resistant'  Tried therapy there, a couple, but 'they don't have the experience I need\"  She was referred by [facility] because of SI and depression, anxiety, difficulty functioning  Onset of symptoms was years ago but worsening course in last 2 months with gradually worsening course since that time  Psychosocial Rob Pisano feels frustrated that her medications have not helped but determined to get through what she is dealing with  She pushes through, forces herself out the door to work and it does help when there, but every day her anxiety and depression are a struggle  Among ongoing worries and stressors, she also carries the past with her  Guilt about mother dying in 2011, felt she did not do all she could when she was alive  They had a lot of differences  Her mom had fell down a flight of stairs, on coumadin, had a bleed  In/out of hospital  She had to make decision to end care  Depression has been difficult as well and together they make functioning very difficulty for her       She notes medications have not helped her   Currently Klonopin 1mg TID (#100, " "last filled 2023), Zoloft 200mg daily x8mo, Seroquel 50mg BID (prescribed higher but makes her groggy; does help though)  Discussed seroquel side effects  She notes labs are monitored by PCP (independent from networks)  He monitors metabolic and other labs (she notes they have been normals, just recently done)     She recently had a cardiac ablation for SVT in May  Helped cardiac issue but did not improve her anxiety  Back pain ongoing, 7/10  Chronic headaches seem tension she thinks historically, not migraines     Stressors: losing her horse 2 months ago (2 were older,  2yr apart, and her 3rd one just ), getting older, \"I have a terrible fear\" of aging, being sick, dying  Loss of her mother in     Per this writer:   Jyoti Dickey referred to [facility] [place] after [facility] to [facility] due to increased depression and anxiety  She identifies that she is struggling with sleep, wakes in panic daily, physical headaches, stomach aches associated with anxiety, loss of interests, anhedonia, passive suicidal thoughts, stays at home - isolation, decreased energy, excessive worry, worthlessness  Struggling to get to and function at work  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  for social aspect  Worries about aging, her spouse, and had to recently put her last horse down  Spouse also had a physical accident at home and is in wound care for leg injury which appeared quite upsetting to her  Per Jyoti Keli: \"my anxiety is worse than ever\"     Strengths: family support, enjoys going to work, willing to reach out for help    Reason for evaluation and partial hospitalization as an alternative to inpatient hospitalization   PHP is medically necessary to prevent hospitalization as outpatient care has been unable to stabilize Jyoti Dickey and a greater intensity of treatment is indicated   Milieu therapy to monitor for medication needs, provide wellness tools education and offer " "opportunity to share and connect to others  Group therapy, case management, psychiatric medication management, family contact and UR as indicated  ELOS 10 treatment days  Previous Psychiatric/psychological treatment/year:   Previous diagnoses include MDD, JACOBY  Prior outpatient psychiatric treatment: yes  Prior therapy: yes  Prior inpatient psychiatric treatment: no  Prior suicide attempts: no  Prior self harm: no  Prior violence or aggression: no  Presently attended [facility] - Dr Brent Rivera      Problem Assessment:     SOCIAL/VOCATION:  Family Constellation (include parents, relationship with each and pertinent Psych/Medical History):     Family History   Problem Relation Age of Onset   • Depression Mother    • Anxiety disorder Mother    • Alcohol abuse Father    • Depression Brother    • Anxiety disorder Brother        Mother: Rain Schulz -  in  (guilt related to loss)  Spouse: Ed (79) -  28 years -  - supportive   Father: Lang -  - not close - ETOH  Children: none   Sibling: brother - Staci Awad (older) - never  - lives with her/spouse        Who is the person you relate to best spouse  she lives with spouse and brother  Legal Guardian (for individuals under 18): na  Family Factors impacting discharge planning (for individuals under 18): na    Domestic Violence: denied  Traumatic History:      Abuse: none  Other Traumatic Events: none      Family Psychiatric History:      Psychiatric Illness:      Brother and mom -anxiety and depression   No BPAD  Substance Abuse:       Father, PGPs, MGF - EtOH  Suicide Attempts:        MGF- killed self (OD), MGrandmother - drank a bottle of ammonia (survived)    Additional Comments related to family/relationships/peer support:   Reports that her mom was a positive support and childhood was fair - enjoyed growing up in [place] and moved to [facility] in [place] 10 years ago with her spouse and brother \"mom encouraged us to never allow the other to grow " "old alone\"  School or Work History (strengths/limitations/needs): 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  at [employer] for the socialization    Her highest grade level achieved was RN     history includes none    Financial status includes Salary of herself and spouse    LEISURE ASSESSMENT (Include past and present hobbies/interests and level of involvement (Ex: Group/Club Affiliations): \"horses were my life\"; goes to work for enjoyment  Her primary language is [place]  Preferred language is [place]  Ethnic considerations are   Religions affiliations and level of involvement raised Rastafarian - is spiritual - no specific involvement   FUNCTIONAL STATUS: There has been a recent change in the patient's ability to do the following: decrease in ADLs due to lack of energy and interest    Level of Assistance Needed/By Whom?: independent    Rogue Parent learns best by  listening and demonstration    SUBSTANCE ABUSE ASSESSMENT: no substance abuse   Tobacco use: yes, 1ppd  Caffeine Use: coffee, 4-5 cups a day, but also decaf  ETOH use: no  Other substance use: tried medial marijuana 'but it did not work for me, made me even more anxious'  Endorses previous experimentation with: no     Longest clean time: not applicable  History of Inpatient/Outpatient rehabilitation program: no       Do you currently smoke? Yes Offered smoking cessation?  Yes     Substance/Route/Age/Amount/Frequency/Last Use: na    DETOX HISTORY: na    Previous detox/rehab treatment: na    HEALTH ASSESSMENT: octavio Serrato DO   [address]  [address]  [address] [id number] [phone]     Date of Last Physical: within 1 year if not within the last year, one has been recommended:No    NUTRITION SCREENING:  Do you have any food allergies: No   Weight loss or gain of 10 pounds or more in the last 3 months: No  Decrease in appetite and/or food intake: Yes  Dental issues impacting nutrition: No  Binging or restricting " "patterns: No  Past treatment for an eating disorder: Yes  Level of nutrition needs: Yes = 1 point; No = 0   2  none (0)- low (1-3) - moderate (4) - severe (5)   Action plan if moderate to severe: Referral to:N\A      LEGAL: No Mental Health Advance Directive or Power of  on file, Information packet given about [address] [place] [address] and denied additional legal    Risk Assessment:   The following ratings are based on my observation of this patient over the last intake today    Risk of Harm to Self:   Demographic risk factors include   Historical Risk Factors include a relative or close friend who  by suicide and chronic psychiatric problems  Recent Specific Risk Factors include passive death wishes, experienced fleeting ideation, unable to visualize a realistic positive future, recent losses horses and diagnosis of depression     Risk of Harm to Others:   Demographic Risk Factors include na  Historical Risk Factors include na  Recent Specific Risk Factors include multiple stressors    Access to Weapons:   Kristi Richards has access to the following weapons: denied  The following steps have been taken to ensure weapons are properly secured: na    Based on the above information, the client presents the following risk of harm to self or others:  low    The following interventions are recommended:   no intervention changes    Notes regarding this Risk Assessment: given on call and [phone]    HPI ROS: per Dr Gray Mckeon  Medication Side Effects: as noted  Depression: 10 /10 (10 worst)  Anxiety: 10 /10 (10 worst)  Safety: SI thoughts, no plan  \"I do not want to die but I don't want to live like this anymore\"  Notes OD, insulin, there are many ways  But also notes the pain it would cause her family, the love of them, and the hope she has for getting through   She has never tried and she would seek help if things got worse, she would not hurt herself  Sleep: good  Energy: low  Appetite: worse with anxiety  Weight " "Change: some gain (\"I am depressed and less mobile\"      Mental status:MENTAL STATUS EXAM  Appearance:  age appropriate   Behavior:  pleasant, cooperative, with good eye contact   Speech:  Normal volume, regular rate and rhythm   Mood:  depressed and anxious   Affect:  mood congruent   Language: intact and appropriate for age, education, and intellect   Thought Process:  Linear and goal directed   Associations: intact associations   Thought Content:  normal and appropriate, negative thinking and cognitive distortions   Perceptual Disturbances: no auditory or visual hallcunations   Risk Potential / Abnormal Thoughts: Suicidal ideation - Yes, but fleeting thougths that are easily controlled, Would seek help, identifies reason to live, has means and plan to keep self safe  Homicidal ideation - None  Potential for aggression - No         Consciousness:  Alert & Awake   Sensorium:  Fully oriented to person, place, time/date   Attention: attention span and concentration are age appropriate   Intellect: within normal limits   Fund of Knowledge:  Memory: awareness of current events: yes  recent and remote memory grossly intact   Insight:  good   Judgment: good   Muscle Strength Muscle Tone:        Gait/Station:     Motor Activity: no abnormal movements      Pain 7/10 back pain         DSM:   1  MDD (major depressive disorder), recurrent severe, without psychosis ([facility] [address] )        2  JACOBY (generalized anxiety disorder)        3  Panic disorder without agoraphobia        4  Social anxiety disorder            Plan: Admit to PHP  Group therapy, case management, medication management, UR and family contact as indicated  ELOS 10 treatment days  Refer to OP psychiatry and therapy        Anticipated aftercare plan: OP care     This case management session was facilitated virtually in a private office using HIPAA Compliant and Approved [facility] Erlinda Gentile consented to the use of tele-video modality of " treatment

## 2023-06-30 NOTE — BH TREATMENT PLAN
Assessment/Plan:         Encounter Diagnoses   Name Primary? • MDD (major depressive disorder), recurrent severe, without psychosis ([facility] [address] ) Yes   • JACOBY (generalized anxiety disorder)    • Panic disorder without agoraphobia    • Social anxiety disorder       Subjective:     Patient ID: Vika Gonzalez is a 59 y o  female  [facility] Treatment Plan   AREAS OF NEED: Depression and anxiety as evidenced by excessive worry, panic, isolation, lack of enjoyment, decrease in energy, sleep disturbance, hopelessness, trouble concentrating related to fears of aging and death of horses  Date Initiated: 6/30/2023    Strengths: family support, enjoys going to work, willing to reach out for help     LONG TERM GOAL:   Date Initiated: 6/30/2023  1 0 I will identify 3 signs that my anxiety has decreased in intensity and frequency and am more active in my day to day life  Target Date: 7/28/2023  Completion Date:     SHORT TERM OBJECTIVES:     Date Initiated: 6/30/2023  1 1 I will identify 3 mindfulness and/or distress tolerance skills I can use to manage fear thoughts  Revision Date:   Target Date: 7/12/2023  Completion Date:     Date Initiated: 6/30/2023  1 2 I will identify 3 activities from Adult Pleasant Events list I can do and pick 1 daily to engage in  Revision Date:   Target Date: 7/12/2023  Completion Date:     Date Initiated: 6/30/2023  1 3 I will take medications as prescribed and share questions and concerns if arise  Revision Date:   Target Date: 7/12/2023  Completion Date:      Date Initiated: 6/30/2023  1 4 I will identify 3 ways my supports can assist in my recovery and agree to staff/support contact as indicated      Revision Date:   Target Date: 7/12/2023  Completion Date:            7 DAY REVISION:    Date Initiated:  Revision Date:   Target Date:   Completion Date:      PSYCHIATRY:  Date Initiated: 6/30/2023  Medication Management and Education       Revision Date:   The person(s) responsible for carrying out the plan is Christa Espinal DO; Asael Franz PA-C    NURSING/SYMPTOM EDUCATION:  Date Initiated: 6/30/2023  1 1, 1 2  1 3, 1 4 This RN and/or Therapist will provide wellness/symptoms and skill education groups three to five days weekly to educate Nina Wetzel on signs and symptoms of diagnoses, skills to manage, and medication questions that will be addressed by the treatment team     Revision date: The person(s) responsible for carrying out the plan is Liam Dowd RN    PSYCHOLOGY:   Date Initiated: 6/30/2023       1 1, 1 2, 1 4 Provide psychotherapy group 5 times per week to allow opportunity for Nina Wetzel  to explore stressors and ways of coping  Revision Date:   The person(s) responsible for carrying out the plan is SARATH Santos; Tiffanie Norton    ALLIED THERAPY:   Date Initiated: 6/30/2023  1 1,1 2 Engage Nina Wetzel in AT group 5 times daily to encourage development and use of wellness tools to decrease symptoms and promote recovery through meaningful activity  Revision Date:   The person(s) responsible for carrying out the plan is LUKE Vivas     CASE MANAGEMENT:   Date Initiated: 6/30/2023      1 0 This  will meet with Nina Wetzel  3-4 times weekly to assess treatment progress, discharge planning, connection to community supports and UR as indicated  Revision Date:   The person(s) responsible for carrying out the plan is Monty BUTLER    TREATMENT REVIEW/COMMENTS:   DISCHARGE CRITERIA:Identify 3 signs of progress and complete relapse prevention plan  DISCHARGE PLAN: OP care  Estimated Length of Stay:10 treatment days  Diagnosis and Treatment Plan explained to Jackie Clemente relates understanding diagnosis and is agreeable to Treatment Plan  CLIENT COMMENTS / Please share your thoughts, feelings, need and/or experiences regarding your treatment plan with Staff    Please see follow up note with comments  Signatures can be found on [facility] Treatment plan consent form

## 2023-07-03 ENCOUNTER — OFFICE VISIT (OUTPATIENT)
Dept: PSYCHOLOGY | Facility: CLINIC | Age: 63
End: 2023-07-03
Payer: COMMERCIAL

## 2023-07-03 DIAGNOSIS — F40.10 SOCIAL ANXIETY DISORDER: ICD-10-CM

## 2023-07-03 DIAGNOSIS — F33.2 MDD (MAJOR DEPRESSIVE DISORDER), RECURRENT SEVERE, WITHOUT PSYCHOSIS (HCC): Primary | Chronic | ICD-10-CM

## 2023-07-03 DIAGNOSIS — F41.0 PANIC DISORDER WITHOUT AGORAPHOBIA: ICD-10-CM

## 2023-07-03 DIAGNOSIS — F41.1 GAD (GENERALIZED ANXIETY DISORDER): ICD-10-CM

## 2023-07-03 DIAGNOSIS — F45.22 BODY DYSMORPHIC DISORDER: ICD-10-CM

## 2023-07-03 PROCEDURE — 90832 PSYTX W PT 30 MINUTES: CPT

## 2023-07-03 PROCEDURE — G0410 GRP PSYCH PARTIAL HOSP 45-50: HCPCS

## 2023-07-03 PROCEDURE — G0176 OPPS/PHP;ACTIVITY THERAPY: HCPCS

## 2023-07-03 NOTE — PSYCH
Subjective:     Patient ID: Varney Boeck is a 59 y.o. female. [facility] Clinical Progress Notes      Specialized Services Documentation  Therapist must complete separate progress note for each specific clinical activity in which the individual participated during the day. Group Psychotherapy Life Skills (3913-1245) Varney Boeck actively engaged in group focused on their personal narrative using the tree of life tool. which was facilitated virtually in a private office using HIPAA Compliant and Approved [employer] Teams. Dawn Boston consented to the use of tele-video modality of treatment and was virtually present for group psychotherapy today. The group watched a FORD talk “The importance of sharing your story” with speaker Wattage. The group created their own tree of life which represents who they are. They had to write words that answered the questions about each part of the tree which represents them. Group members identified what they learned by doing this activity. Dawn Boston recognized that she needs peacefulness, affirmations, and to slow down her thoughts in order to grow. Dawn Boston created a goal to work towards accomplishing the one area that she wants to grow. Group members discussed why it is important to share their story with at least one person. Dawn Boston continues to make progress towards goals through verbal participation in group; to accomplish long term goals continue to utilize skills learned in programming. Continue with psychotherapy to educate and encourage use of wellness tools. Tx Plan Objective: 1.1,1.2 Therapist: SARATH Quijano    Education Therapy   [id number] Varney Boeck actively shared in morning assessment and goal review. Presented as Receptive related to readiness to learn. Varney Boeck did complete goal from last treatment day identifying gaining hope. did not present with any barriers to learning.      [address] engaged throughout the treatment day. Was engaged in learning related to Illness, Medication, Aftercare, and Wellness Tools. Staff utilized Verbal, Written, A/V, and Demonstration teaching methods. Yann Valentine shared area of learning and set a goal for outside of program to practice affirmations.       Tx Plan Objective: 1.1,1.2,1.4 Therapist:  MARLEY Judge, SARATH

## 2023-07-03 NOTE — PSYCH
Visit Time    Visit Start Time: 0930  Visit Stop Time: 8564  Total Visit Duration: 1 hour    Subjective:     Patient ID: Jeana Hopson is a 59 y.o. y.o. female. [facility] Clinical Progress Notes      Specialized Services Documentation  Therapist must complete separate progress note for each specific clinical activity in which the individual participated during the day. This group was facilitated virtually in a private office using HIPAA Compliant and Approved [employer] Teams. Jeana Hopson consented to the use of tele-video modality of treatment. Allied Therapy   Jeana Hopson actively shared in music therapy group exploring DBT distress tolerance “crisis survival strategies”. Group explored crisis survival strategies “ACCEPTS, TIP, and STOP) reinforcing actions one could take to learn to tolerate stressful experiences, thoughts and urges. Alexa participated in STOP examples, square breathing, and Progressive Music Relaxation to explore several strategies. She felt she could put effort into practicing ACCEPTS. Some beginning progress toward goal noted. Continue AT to encourage learning, practice, and home practice of skills to manage distress.    Tx Plan Objective: 1.1, Therapist:  Anderson BUTLER

## 2023-07-03 NOTE — PSYCH
Visit Time    Visit Start Time: 9717  Visit Stop Time: 4586  Total Visit Duration: 17 minutes    Subjective:     Patient ID: Sheryle Mart is a 59 y.o. female. [facility] Clinical Progress Notes      Specialized Services Documentation  Therapist must complete separate progress note for each specific clinical activity in which the individual participated during the day. Case Management Note    [facility]    Current suicide risk : Low    This case management session was facilitated virtually in a private office using HIPAA Compliant and Approved Microsoft Teams. Sheryle Mart consented to the use of tele-video modality of treatment. Met with Sheryle Mart. Reported enjoying the program thus far. She believes she is learning new skills and would like to continue to do so. Reviewed tx plan (which she had already read on My Chart) and sent DBT Ple[facility] [employer] list. She reported working over the weekend but was "very anxious" last night. She did not want to go into details. Reminded of med check on Wednesday morning and advised to take Zoloft with food. I,Linette Davila,am physically unable to provide a signature; however, I understand the nature of and am in agreement with the documentation presented to me via TEAMS. I have received a copy through My Chart and/or the [employer]. I freely give verbal consent. Name of Document (s): Treatment plan  Witness to verbal consent: Melanie Rhodes  Witness to verbal consent: Santy Aggarwal    Medications changes/added/denied? No    Treatment session number: 2    Individual Case Management Visit provided today?  Yes     [facility] follow up physician's orders: na

## 2023-07-03 NOTE — PSYCH
Virtual Regular Visit    Verification of patient location:    Patient is located at Home in the following state in which I hold an active license [place]      Assessment/Plan:    Problem List Items Addressed This Visit        Other    MDD (major depressive disorder), recurrent severe, without psychosis ([address]) - Primary (Chronic)    JACOBY (generalized anxiety disorder)    PROVISIONAL Body dysmorphic disorder    Panic disorder without agoraphobia    Social anxiety disorder       Goals addressed in session:           Reason for visit is PHP VIRTUAL GROUP DUE TO virtual preference of care         Encounter provider [facility], [address] [place] [address]    Provider located at [address] [address] [address]      Recent Visits  Date Type Provider Dept   06/30/23 Office Visit [address] PARTIAL PSYCH GROUP THERAPY [facility]   Showing recent visits within past 7 days and meeting all other requirements  Today's Visits  Date Type Provider Dept   07/03/23 Office Visit [address] PARTIAL PSYCH GROUP THERAPY [facility]   Showing today's visits and meeting all other requirements  Future Appointments  No visits were found meeting these conditions. Showing future appointments within next 150 days and meeting all other requirements       The patient was identified by name and date of birth. Clide Bosworth was informed that this is a telemedicine visit and that the visit is being conducted [employer]. She agrees to proceed. .  My office door was closed. No one else was in the room. She acknowledged consent and understanding of privacy and security of the video platform. The patient has agreed to participate and understands they can discontinue the visit at any time. Patient is aware this is a billable service. Subjective  Clide Bosworth is a 59 y.o. female  .       HPI     Past Medical History: Diagnosis Date   • Anxiety    • SVT (supraventricular tachycardia) ([address])        Past Surgical History:   Procedure Laterality Date   • CARDIAC ELECTROPHYSIOLOGY PROCEDURE N/A 05/30/2023    Procedure: Cardiac eps/svt ablation;  Surgeon: Maty Cunningham MD;  [facility]; Service: Cardiology   • JOINT REPLACEMENT  2010    right hip       Current Outpatient Medications   Medication Sig Dispense Refill   • clonazePAM (KlonoPIN) 1 mg tablet Take 1 mg by mouth 3 (three) times a day     • QUEtiapine Fumarate (SEROQUEL PO) Take 50 mg by mouth 2 (two) times a day Was prescribed 100mg up to QID she reports, but dose not tolerated     • sertraline (Zoloft) 100 mg tablet Take 150mg daily, taper will be discussed with PHP provider       No current facility-administered medications for this visit. No Known Allergies    Review of Systems    Video Exam    There were no vitals filed for this visit. Physical Exam     I spent FOUR GROUP HOURS PLUS CASE MANAGEMENT minutes with patient today in which greater than 50% of the time was spent in counseling/coordination of care regarding PHP - SEE NOTES.

## 2023-07-03 NOTE — PSYCH
Subjective:     Patient ID: Zeinab Sheppard is a 59 y.o. female. [facility] Clinical Progress Notes      Specialized Services Documentation  Therapist must complete separate progress note for each specific clinical activity in which the individual participated during the day. Group Psychotherapy  This group was facilitated virtually in a private office using HIPAA Compliant and Approved Microsoft Teams. Zeinab Sheppard consented to the use of tele-video modality of treatment  ([id number]) Zeinab Sheppard attended group on the [address] [place] [address] of Recovery and Wellness. Group members were educated on the background of the 5 Key Facets of Recovery and Wellness exercise. First, members answered the following questions:  • What does wellness mean to you? • My definition of recovery is. .. • Today, I will support myself by. .. Members then focused the following aspects:   · 5 Key Facets of Recover and [address] [place] [address], Education, Advocacy, Responsibility, Support  · Hope-  · I can educate myself by. ..  · Name some self advocating opportunities  · I take personal responsibility by. ..  · My supporters are. .. Lastly, the group completed an exercise titled "Having a tough day". The group members were asked to write out a negative statement (trash talk) about themselves, then rephrase it into a positive affirmation. Next they:  • Threw out the "trash talk" statement  • Read aloud the positive affirmation statement. Good effort towards progress displayed through participation and engagement in topic. Group members were encouraged to use these questions to continue on their journey to wellness.     Treatment Plan Objectives: 1.1, 1.2  Therapist: Jade CRUZ RN

## 2023-07-05 ENCOUNTER — OFFICE VISIT (OUTPATIENT)
Dept: PSYCHOLOGY | Facility: CLINIC | Age: 63
End: 2023-07-05
Payer: COMMERCIAL

## 2023-07-05 DIAGNOSIS — F41.1 GAD (GENERALIZED ANXIETY DISORDER): ICD-10-CM

## 2023-07-05 DIAGNOSIS — F40.10 SOCIAL ANXIETY DISORDER: ICD-10-CM

## 2023-07-05 DIAGNOSIS — F33.2 MDD (MAJOR DEPRESSIVE DISORDER), RECURRENT SEVERE, WITHOUT PSYCHOSIS (HCC): Primary | Chronic | ICD-10-CM

## 2023-07-05 DIAGNOSIS — F41.0 PANIC DISORDER WITHOUT AGORAPHOBIA: ICD-10-CM

## 2023-07-05 DIAGNOSIS — F45.22 BODY DYSMORPHIC DISORDER: ICD-10-CM

## 2023-07-05 PROCEDURE — G0410 GRP PSYCH PARTIAL HOSP 45-50: HCPCS

## 2023-07-05 PROCEDURE — G0176 OPPS/PHP;ACTIVITY THERAPY: HCPCS

## 2023-07-05 PROCEDURE — 90832 PSYTX W PT 30 MINUTES: CPT

## 2023-07-05 PROCEDURE — G0177 OPPS/PHP; TRAIN & EDUC SERV: HCPCS

## 2023-07-05 NOTE — PSYCH
Subjective:     Patient ID: Sheryle Mart is a 59 y.o. female. [facility] Clinical Progress Notes      Specialized Services Documentation  Therapist must complete separate progress note for each specific clinical activity in which the individual participated during the day. Group Psychotherapy Life Skills ([id number]) Sheryle Mart actively engaged in group focused on hope which was facilitated virtually in a private office using HIPAA Compliant and Approved Microsoft Teams. Sheryle Mart consented to the use of tele-video modality of treatment and was virtually present for group psychotherapy today. Group members discussed what hope is to them and what causes them to lose hope. Group members were asked to think about what symbolizes hope for them. Participants kimo pictures and shared what their symbol of hope is and what it means to them. Linette's symbol of hope is a peace sign. Group members discussed ways to cultivate hope in their life. The group watched a short video, 5 Ways to Get Back on Track When Time Suggs. Group members stated their take away from the video. Sheryle Mart continues to make progress towards goals through verbal participation in group; to accomplish long term goals continue to utilize skills learned in programming. Continue with psychotherapy to educate and encourage use of wellness tools.  Tx Plan Objective: 1.1,1.2 Therapist: Matthew Hua

## 2023-07-05 NOTE — PSYCH
Virtual Regular Visit    Verification of patient location:    Patient is located at Home in the following state in which I hold an active license [place]      Assessment/Plan:    Problem List Items Addressed This Visit        Other    MDD (major depressive disorder), recurrent severe, without psychosis ([address]) - Primary (Chronic)    JACOBY (generalized anxiety disorder)    PROVISIONAL Body dysmorphic disorder    Panic disorder without agoraphobia    Social anxiety disorder       Goals addressed in session:           Reason for visit is PHP VIRTUAL GROUP DUE TO virtual preference of care         Encounter provider [address] [facility], [address] [place] [address]    [place] at [address] [address] [address]      Recent Visits  Date Type Provider Dept   07/03/23 Office Visit [address] [place] PARTIAL PSYCH GROUP THERAPY [facility]   06/30/23 Office Visit [address] [place] [facility]IAL [facility]CH GROUP [facility]AP[facility]   Showing recent visits within past 7 days and meeting all other requirements  Today's Visits  Date Type Provider Dept   07/05/23 Office Visit [address] [place] PARTIAL [facility]YCH GROUP TH[facility]AP[facility]   Showing today's visits and meeting all other requirements  Future Appointments  No visits were found meeting these conditions. Showing future appointments within next 150 days and meeting all other requirements       The patient was identified by name and date of birth. Rashida Sims was informed that this is a telemedicine visit and that the visit is being conducted [employer]. She agrees to proceed. .  My office door was closed. No one else was in the room. She acknowledged consent and understanding of privacy and security of the video platform. The patient has agreed to participate and understands they can discontinue the visit at any time. Patient is aware this is a billable service.      Subjective  Parviz Si Facundo Grant is a 59 y.o. female  . HPI     Past Medical History:   Diagnosis Date   • Anxiety    • SVT (supraventricular tachycardia) ([address])        Past Surgical History:   Procedure Laterality Date   • CARDIAC ELECTROPHYSIOLOGY PROCEDURE N/A 05/30/2023    Procedure: Cardiac eps/svt ablation;  Surgeon: Filipe Cho MD;  Location:  CARDIAC CATH LAB; Service: Cardiology   • JOINT REPLACEMENT  2010    right hip       Current Outpatient Medications   Medication Sig Dispense Refill   • clonazePAM (KlonoPIN) 1 mg tablet Take 1 mg by mouth 3 (three) times a day     • QUEtiapine Fumarate (SEROQUEL PO) Take 50 mg by mouth 2 (two) times a day Was prescribed 100mg up to QID she reports, but dose not tolerated     • sertraline (Zoloft) 100 mg tablet Take 150mg daily, taper will be discussed with PHP provider       No current facility-administered medications for this visit. No Known Allergies    Review of Systems    Video Exam    There were no vitals filed for this visit. Physical Exam     I spent FOUR GROUP HOURS PLUS CASE MANAGEMENT minutes with patient today in which greater than 50% of the time was spent in counseling/coordination of care regarding PHP - SEE NOTES. Quality 226: Preventive Care And Screening: Tobacco Use: Screening And Cessation Intervention: Patient screened for tobacco use and is an ex/non-smoker Detail Level: Detailed Quality 130: Documentation Of Current Medications In The Medical Record: Current Medications Documented Quality 111:Pneumonia Vaccination Status For Older Adults: Pneumococcal Vaccination not Administered or Previously Received, Reason not Otherwise Specified Quality 110: Preventive Care And Screening: Influenza Immunization: Influenza Immunization previously received during influenza season Quality 47: Advance Care Plan: Advance Care Planning discussed and documented in the medical record; patient did not wish or was not able to name a surrogate decision maker or provide an advance care plan.

## 2023-07-05 NOTE — PSYCH
Visit Time    Visit Start Time: 6026  Visit Stop Time: 8311  Total Visit Duration: 16 minutes    Subjective:     Patient ID: Tez Steele is a 59 y.o. female. [facility] Clinical Progress Notes      Specialized Services Documentation  Therapist must complete separate progress note for each specific clinical activity in which the individual participated during the day. Case Management Note    [facility]    Current suicide risk : Low     This case management session was facilitated virtually in a private office using HIPAA Compliant and Approved [employer] Teams. Tez Steele consented to the use of tele-video modality of treatment. Met with Tez Steele. Reported that "a lot of emotions" are coming up for her in the process of sessions. She shared about being "raised and punished by guilt" and struggling with this revelation. She was open to sharing and exploring ways she may process this going forward. Medications changes/added/denied? No    Treatment session number: 3    Individual Case Management Visit provided today?  Yes     [facility] follow up physician's orders: na

## 2023-07-05 NOTE — PSYCH
Subjective:     Patient ID: Ronan Serrato is a 59 y.o. female. [facility] Clinical Progress Notes      Specialized Services Documentation  Therapist must complete separate progress note for each specific clinical activity in which the individual participated during the day. Group Psychotherapy Life Skills ([id number]) Ronan Serrato actively engaged in group focused on positive affirmations which was facilitated virtually in a private office using HIPAA Compliant and Approved Microsoft Teams. David Howell consented to the use of tele-video modality of treatment and was virtually present for group psychotherapy today. Group members discussed the difference between healthy self-esteem and being overly confident. The group focused on acknowledging and accepting positive qualities in front of peers. During the activity group members stated positive affirmations of themselves. Group members processed the activity by discussing their experience when doing this exercise. We talked about the reasons why it is difficult to acknowledge our positive qualities. We discussed how they could use positive affirmations in their personal life. David Howell stated that a positive affirmation that they would use is, "I am caring and giving". David Howell continues to make progress towards goals through verbal participation in group; to accomplish long term goals continue to utilize skills learned in programming. Continue with psychotherapy to educate and encourage use of wellness tools. Tx Plan Objective: 1.1,1.2 Therapist: Crystal [facility]    Education Therapy   [id number] Ronan Serrato actively shared in morning assessment and goal review. Presented as Receptive related to readiness to learn. Ronan Serrato did complete goal from last treatment day identifying gaining advocacy. did not present with any barriers to learning. [phone] Ronan Serrato engaged throughout the treatment day.  Was engaged in learning related to Illness, Medication, Aftercare, and Wellness Tools. Staff utilized Verbal, Written, A/V, and Demonstration teaching methods. Aminata Harry shared area of learning and set a goal for outside of program to stay calm.       Tx Plan Objective: 1.1,1.2,1.4 Therapist:  MARLEY De La Vega, SARATH

## 2023-07-05 NOTE — PSYCH
Subjective:     Patient ID: Viridiana Dolan is a 59 y.o. female. [facility] Clinical Progress Notes      Specialized Services Documentation  Therapist must complete separate progress note for each specific clinical activity in which the individual participated during the day. Group Psychotherapy  This group was facilitated virtually in a private office using HIPAA Compliant and Approved [employer] Teams. Viridiana Dolan consented to the use of tele-video modality of treatment  ([id number]) Viridiana Dolan attended group on the Part 1 of Wellness Recovery Action Plan. Group members were educated on the background of the WRAP. Members were informed WRAP was emailed to them. Writer explained the benefit of utilizing the WRAP prior to members initiating it. Members then focused developing the following portions of WRAP:   · the wellness toolbox    Writer encouraged the members of group to continue utilizing the packet to develop plans inside and outside of program. Good effort towards progress displayed through participation and engagement in topic.   Treatment Plan Objectives: 1.1, 1.2  Therapist: Mei CRUZ RN

## 2023-07-05 NOTE — PSYCH
Visit Time    Visit Start Time: 0930  Visit Stop Time: 7891  Total Visit Duration: 60 minutes    Subjective:     Patient ID: Freddy Traore is a 59 y.o. y.o. female. [facility] Clinical Progress Notes      Specialized Services Documentation  Therapist must complete separate progress note for each specific clinical activity in which the individual participated during the day. This group was facilitated virtually in a private office using HIPAA Compliant and Approved Microsoft Teams. Freddy Traore consented to the use of tele-video modality of treatment. Allied Therapy  Freddy Traore actively shared in Music Therapy group focused on the concept of mindfulness and collado mind. Alexa shared in discussion/task related to difference between emotion, reasonable, and collado mind. Alexa was observed to be engaged in therapist led object meditation. Goals of mindfulness reviewed with "Living in the Moment" video utilized. Group discussed specific ways to practice refocusing thoughts to the present and offered encouragement to use these things regularly to manage stressors consistently. She was very engaged and shared meaningfully throughout group demonstrating positive effort noted toward tx goal.  Continue music therapy to encourage development of wellness skills and consistent practice.     Tx Plan Objective: 1.1, Therapist:  Sadi Ivey MT-BC

## 2023-07-06 ENCOUNTER — OFFICE VISIT (OUTPATIENT)
Dept: PSYCHOLOGY | Facility: CLINIC | Age: 63
End: 2023-07-06
Payer: COMMERCIAL

## 2023-07-06 DIAGNOSIS — F33.2 MDD (MAJOR DEPRESSIVE DISORDER), RECURRENT SEVERE, WITHOUT PSYCHOSIS (HCC): Primary | Chronic | ICD-10-CM

## 2023-07-06 DIAGNOSIS — F40.10 SOCIAL ANXIETY DISORDER: ICD-10-CM

## 2023-07-06 DIAGNOSIS — F45.22 BODY DYSMORPHIC DISORDER: ICD-10-CM

## 2023-07-06 DIAGNOSIS — F41.1 GAD (GENERALIZED ANXIETY DISORDER): ICD-10-CM

## 2023-07-06 DIAGNOSIS — F41.0 PANIC DISORDER WITHOUT AGORAPHOBIA: ICD-10-CM

## 2023-07-06 PROCEDURE — G0176 OPPS/PHP;ACTIVITY THERAPY: HCPCS

## 2023-07-06 PROCEDURE — G0177 OPPS/PHP; TRAIN & EDUC SERV: HCPCS

## 2023-07-06 PROCEDURE — G0410 GRP PSYCH PARTIAL HOSP 45-50: HCPCS

## 2023-07-06 NOTE — PSYCH
Subjective:     Patient ID: Katheryn Trinh is a 59 y.o. female. [facility] Clinical Progress Notes      Specialized Services Documentation  Therapist must complete separate progress note for each specific clinical activity in which the individual participated during the day. Case Management Note    [facility]    Current suicide risk : Low     Not a case management day for Katheryn Trinh today. Did not reach out with any additional questions and concerns and aware of next scheduled 1:1.      Medications changes/added/denied? No    Treatment session number: 4    Individual Case Management Visit provided today?  No    [facility] follow up physician's orders: na

## 2023-07-06 NOTE — PSYCH
Subjective:     Patient ID: Tawanda Contreras is a 59 y.o. female. [facility] Clinical Progress Notes      Specialized Services Documentation  Therapist must complete separate progress note for each specific clinical activity in which the individual participated during the day. Group Psychotherapy     This group was facilitated virtually in a private office using HIPAA Compliant and Approved [employer] Teams. Tawanda Contreras  consented to the use of tele-video modality of treatment. ([id number])  Tawanda Contreras  actively shared in psychotherapy group exploring the weekly wellness assessment. Participants explored successes and challenges in wellness areas throughout this past week - physical, social, vocational, spiritual, cognitive, and emotional.  Good positive progress toward goal noted. Continue psychotherapy to encourage self-awareness and home practice of skills to support wellness.     Treatment Plan Objective: 1.1, 1.2, 1.3, 1.4    Therapist: Leslie CRUZ RN

## 2023-07-06 NOTE — PSYCH
Subjective:     Patient ID: Tez Steele is a 59 y.o. female. [facility] Clinical Progress Notes      Specialized Services Documentation  Therapist must complete separate progress note for each specific clinical activity in which the individual participated during the day. Group Psychotherapy  This group was facilitated virtually in a private office using HIPAA Compliant and Approved Microsoft Teams. Tez Steele consented to the use of tele-video modality of treatment  ([id number]) Tez Steele attended group on Part II of Wellness Recovery Action Plan. Today, members focused on  WRAP's following specificsections and encouraged to fill in coping tools from their toolbox for planned responses:   · Daily Plan   · Identification of triggers and stressors  · Early warning signs  · When things are breaking down and or getting worse  · Crisis Plan  · Post crisis plan    Group members shared pieces of information from these sections and identified their importance. Writer encouraged the members of group to continue utilizing the packet to develop plans inside and outside of program. Good effort towards progress of goals which were displayed through participation and engagement in topic. Treatment Plan Objectives: 1.1, 1.2  Therapist: Julia CRUZ RN     Co-led Darren Foss [facility]       Education Therapy   [id number] Tez Steele actively shared in morning assessment and goal review. Presented as Receptive related to readiness to learn. Tez Steele did complete goal from last treatment day identifying gaining responsibility. did not present with any barriers to learning. [id number] Tez Steele engaged throughout the treatment day. Was engaged in learning related to Illness, Medication, Aftercare, and Wellness Tools. Staff utilized Verbal, Written, A/V, and Demonstration teaching methods.   Tez Steele shared area of learning and set a goal for outside of program to remain calm and go over notes from group.       Tx Plan Objective: 1.1,1.2,1.4 Therapist:  Radha MINAN RN

## 2023-07-06 NOTE — PSYCH
Visit Time    Visit Start Time: 0930  Visit Stop Time: 5433  Total Visit Duration: 60 minutes    Subjective:     Patient ID: Sharon Tellez is a 59 y.o. y.o. female. [facility] Clinical Progress Notes      Specialized Services Documentation  Therapist must complete separate progress note for each specific clinical activity in which the individual participated during the day. Allied Therapy Group   Gabino Chi shared in music therapy group focused DBT Module Interpersonal Effectiveness and [facility] skill. Engaged in tasks exploring what makes it difficult to share and strategies to improve with supports. Alexa participated in discussion related to communication roadblocks. She was an active participant as group worked together to practice writing out a meaningful interaction using [facility]. Positive progress toward goal noted. Continue AT to encourage sharing, personal advocacy and practice of wellness tools.         Tx Plan Objective: 1.2,1.4, Therapist:  Cassandra [facility]UBC

## 2023-07-06 NOTE — PSYCH
Virtual Regular Visit    Verification of patient location:    Patient is located at Home in the following state in which I hold an active license [place]      Assessment/Plan:    Problem List Items Addressed This Visit        Other    MDD (major depressive disorder), recurrent severe, without psychosis ([address]) - Primary (Chronic)    JACOBY (generalized anxiety disorder)    PROVISIONAL Body dysmorphic disorder    Panic disorder without agoraphobia    Social anxiety disorder       Goals addressed in session:           Reason for visit is PHP VIRTUAL GROUP DUE TO VIRTUAL PREFERENCE      Encounter provider [facility] PARTIAL [address]    Provider located at [address] [facility]  [address] [address]      Recent Visits  Date Type Provider Dept   07/05/23 Office Visit [facility] PARTIAL PSYCH GROUP THERAPY [facility]   07/03/23 Office Visit [facility] PARTIAL PSYCH GROUP THERAPY [facility]   06/30/23 Office Visit [facility] PARTIAL PSYCH GROUP THERAPY [facility]   Showing recent visits within past 7 days and meeting all other requirements  Today's Visits  Date Type Provider Dept   07/06/23 Office Visit [facility] PARTIAL PSYCH GROUP THERAPY [facility]   Showing today's visits and meeting all other requirements  Future Appointments  No visits were found meeting these conditions. Showing future appointments within next 150 days and meeting all other requirements       The patient was identified by name and date of birth. Viridiana Dolan was informed that this is a telemedicine visit and that the visit is being conducted [employer]. She agrees to proceed. .  My office door was closed. No one else was in the room. She acknowledged consent and understanding of privacy and security of the video platform.  The patient has agreed to participate and understands they can discontinue the visit at any time.    Patient is aware this is a billable service. Jada Sims is a 59 y.o. female . HPI     Past Medical History:   Diagnosis Date   • Anxiety    • SVT (supraventricular tachycardia) ([address])        Past Surgical History:   Procedure Laterality Date   • CARDIAC ELECTROPHYSIOLOGY PROCEDURE N/A 05/30/2023    Procedure: Cardiac eps/svt ablation;  Surgeon: Praveen Mcdonald MD;  [facility]; Service: Cardiology   • JOINT REPLACEMENT  2010    right hip       Current Outpatient Medications   Medication Sig Dispense Refill   • clonazePAM (KlonoPIN) 1 mg tablet Take 1 mg by mouth 3 (three) times a day     • QUEtiapine Fumarate (SEROQUEL PO) Take 50 mg by mouth 2 (two) times a day Was prescribed 100mg up to QID she reports, but dose not tolerated     • sertraline (Zoloft) 100 mg tablet Take 150mg daily, taper will be discussed with PHP provider       No current facility-administered medications for this visit. No Known Allergies    Review of Systems    Video Exam    There were no vitals filed for this visit. Physical Exam     I spent FOUR GROUP HOURS PLUS CASE MANAGEMENT minutes with patient today in which greater than 50% of the time was spent in counseling/coordination of care regarding PHP - SEE NOTES.

## 2023-07-07 ENCOUNTER — OFFICE VISIT (OUTPATIENT)
Dept: PSYCHOLOGY | Facility: CLINIC | Age: 63
End: 2023-07-07
Payer: COMMERCIAL

## 2023-07-07 ENCOUNTER — TELEMEDICINE (OUTPATIENT)
Dept: PSYCHIATRY | Facility: CLINIC | Age: 63
End: 2023-07-07

## 2023-07-07 DIAGNOSIS — F41.1 GAD (GENERALIZED ANXIETY DISORDER): ICD-10-CM

## 2023-07-07 DIAGNOSIS — F33.2 MDD (MAJOR DEPRESSIVE DISORDER), RECURRENT SEVERE, WITHOUT PSYCHOSIS (HCC): Primary | Chronic | ICD-10-CM

## 2023-07-07 DIAGNOSIS — F40.10 SOCIAL ANXIETY DISORDER: ICD-10-CM

## 2023-07-07 DIAGNOSIS — F41.0 PANIC DISORDER WITHOUT AGORAPHOBIA: ICD-10-CM

## 2023-07-07 DIAGNOSIS — F45.22 BODY DYSMORPHIC DISORDER: ICD-10-CM

## 2023-07-07 PROCEDURE — G0410 GRP PSYCH PARTIAL HOSP 45-50: HCPCS

## 2023-07-07 PROCEDURE — 90832 PSYTX W PT 30 MINUTES: CPT

## 2023-07-07 PROCEDURE — G0177 OPPS/PHP; TRAIN & EDUC SERV: HCPCS

## 2023-07-07 PROCEDURE — G0176 OPPS/PHP;ACTIVITY THERAPY: HCPCS

## 2023-07-07 RX ORDER — DULOXETIN HYDROCHLORIDE 30 MG/1
30 CAPSULE, DELAYED RELEASE ORAL DAILY
Qty: 30 CAPSULE | Refills: 0 | Status: SHIPPED | OUTPATIENT
Start: 2023-07-07 | End: 2023-07-13 | Stop reason: SDUPTHER

## 2023-07-07 NOTE — PSYCH
Virtual Regular Visit    Verification of patient location:    Patient is located at Home in the following state in which I hold an [place] license [place]      Assessment/Plan:    Problem List Items Addressed This Visit        Other    MDD (major depressive disorder), recurrent severe, without psychosis ([address]) - Primary (Chronic)    JACOBY (generalized anxiety disorder)    PROVISIONAL Body dysmorphic disorder    Panic disorder without agoraphobia    Social anxiety disorder       Goals addressed in session:           Reason for visit is PHP VIRTUAL GROUP DUE TO virtual preference of care         Encounter provider [address] [facility], [address] [place] [address]    Provider located at [address] [address] [address]      Recent Visits  Date Type Provider Dept   07/06/23 Office Visit [address] [place] PARTIAL PSYCH GROUP THERAPY [facility]   07/05/23 Office Visit [address] [place] PARTIAL PSYCH GROUP THERAPY [facility]   07/03/23 Office Visit [address] [place] PARTIAL PSYCH GROUP THERAPY [facility]   06/30/23 Office Visit [address] [place] PARTIAL PSYCH GROUP THERAPY [facility]   Showing recent visits within past 7 days and meeting all other requirements  Today's Visits  Date Type Provider Dept   07/07/23 Office Visit [address] [place] PARTIAL PSYCH GROUP THERAPY [facility]   Showing today's visits and meeting all other requirements  Future Appointments  No visits were found meeting these conditions. Showing future appointments within next 150 days and meeting all other requirements       The patient was identified by name and date of birth. Sheryle Mart was informed that this is a telemedicine visit and that the visit is being conducted [employer]. She agrees to proceed. .  My office door was closed. No one else was in the room. She acknowledged consent and understanding of privacy and security of the video platform.  The patient has agreed to participate and understands they can discontinue the visit at any time. Patient is aware this is a billable service. Subjective  Yvette Rivera is a 59 y.o. female  . HPI     Past Medical History:   Diagnosis Date   • Anxiety    • SVT (supraventricular tachycardia) ([address])        Past Surgical History:   Procedure Laterality Date   • CARDIAC ELECTROPHYSIOLOGY PROCEDURE N/A 05/30/2023    Procedure: Cardiac eps/svt ablation;  Surgeon: Yair Valenzuela MD;  [facility]; Service: Cardiology   • JOINT REPLACEMENT  2010    right hip       Current Outpatient Medications   Medication Sig Dispense Refill   • clonazePAM (KlonoPIN) 1 mg tablet Take 1 mg by mouth 3 (three) times a day     • DULoxetine (Cymbalta) 30 mg delayed release capsule Take 1 capsule (30 mg total) by mouth daily 30 capsule 0   • QUEtiapine Fumarate (SEROQUEL PO) Take 50 mg by mouth 2 (two) times a day Was prescribed 100mg up to QID she reports, but dose not tolerated     • sertraline (Zoloft) 100 mg tablet Take 150mg daily, taper will be discussed with PHP provider       No current facility-administered medications for this visit. No Known Allergies    Review of Systems    Video Exam    There were no vitals filed for this visit. Physical Exam     I spent FOUR GROUP HOURS PLUS CASE MANAGEMENT minutes with patient today in which greater than 50% of the time was spent in counseling/coordination of care regarding PHP - SEE NOTES.

## 2023-07-07 NOTE — PSYCH
Virtual Regular Visit    Verification of patient location:    Patient is located at Home in the following state in which I hold an active license [place]          Encounter provider Elisa rTaylor PA-C    Provider located at  [address] [address] [address] [phone]      Recent Visits  Date Type Provider Dept   06/30/23 [address] [place] [address],  [facility] Layo Ghosh recent visits within past 7 days and meeting all other requirements  Future Appointments  No visits were found meeting these conditions. Showing future appointments within next 150 days and meeting all other requirements       The patient was identified by name and date of birth. Rodríguez Aj was informed that this is a telemedicine visit and that the visit is being conducted [employer]. She agrees to proceed. .  My office door was closed. No one else was in the room. She acknowledged consent and understanding of privacy and security of the video platform. The patient has agreed to participate and understands they can discontinue the visit at any time. Patient is aware this is a billable service. Progress Note - Behavioral [facility]  Rodríguez Aj 59 y.o. female MRN: [id number]     Progress at partial program: unchanged. Chart reviewed and discussed in treatment team.  Seen by Dr. Martines Morning 6/30 at which time zoloft taper started with plans to start cymbalta after ok with cardiology. Cardiology has no concerns. Alexa states she has been on zoloft total of 6 mths. Not feeling any better, and anxiety feels worse- anxiety accompanied by excessive worry, fearfulness, apprehension, ruminating/negative thoughts, headaches, diarrhea, nausea. Physical symptoms of anxiety not worse with decrease in zoloft.   Remains very depressed accompanied by sadness, hopelessness, guilt, SI- thoughts of being better off dead; denies intent or plan of suicide; denies presence of guns in the home. Sleep is good with 50 mg seroquel. Is taking 150 mg/d seroquel on most days in divided doses. ROS:   As above otherwise negative    Active Ambulatory Problems     Diagnosis Date Noted   • JACOBY (generalized anxiety disorder)    • SVT (supraventricular tachycardia) ([facility])    • Smoking    • MDD (major depressive disorder), recurrent severe, without psychosis ([address]) 06/30/2023   • PROVISIONAL Body dysmorphic disorder 06/30/2023   • Panic disorder without agoraphobia 06/30/2023   • Social anxiety disorder 06/30/2023     Resolved Ambulatory Problems     Diagnosis Date Noted   • Marijuana use      Past Medical History:   Diagnosis Date   • Anxiety      Family History   Problem Relation Age of Onset   • Depression Mother    • Anxiety disorder Mother    • Alcohol abuse Father    • Depression Brother    • Anxiety disorder Brother      No Known Allergies       Mental Status Evaluation:    Appearance:  age appropriate, adequate grooming   Behavior:  cooperative   Speech:  normal rate and volume   Mood:  depressed, anxious   Affect:  mood-congruent   Thought Process:  goal directed   Associations: intact associations   Thought Content:  no overt delusions, negative thoughts, ruminating thoughts   Perceptual Disturbances: none   Risk Potential: Suicidal ideation - Yes, passive death wish  Homicidal ideation - None   Sensorium:  oriented to person, place and time/date   Memory:  recent and remote memory grossly intact   Consciousness:  alert and awake   Attention: attention span and concentration are age appropriate   Insight:  intact   Judgment: intact   Gait/Station: unable to assess   Motor Activity: unable to assess today due to virtual visit       Laboratory results: I have personally reviewed all pertinent laboratory/tests results.       Assessment   Diagnoses and all orders for this visit:    MDD (major depressive disorder), recurrent severe, without psychosis ([address])  -     DULoxetine (Cymbalta) 30 mg delayed release capsule; Take 1 capsule (30 mg total) by mouth daily    JACOBY (generalized anxiety disorder)  -     DULoxetine (Cymbalta) 30 mg delayed release capsule; Take 1 capsule (30 mg total) by mouth daily    Panic disorder without agoraphobia  -     DULoxetine (Cymbalta) 30 mg delayed release capsule; Take 1 capsule (30 mg total) by mouth daily       Current Outpatient Medications   Medication Sig Dispense Refill   • DULoxetine (Cymbalta) 30 mg delayed release capsule Take 1 capsule (30 mg total) by mouth daily 30 capsule 0   • clonazePAM (KlonoPIN) 1 mg tablet Take 1 mg by mouth 3 (three) times a day     • QUEtiapine Fumarate (SEROQUEL PO) Take 50 mg by mouth 2 (two) times a day Was prescribed 100mg up to QID she reports, but dose not tolerated     • sertraline (Zoloft) 100 mg tablet Take 150mg daily, taper will be discussed with PHP provider       No current facility-administered medications for this visit. Plan    Planned medication and treatment changes:  Per Dr Gerardo Cain. Will start cymbalta 30 mg/d- if tolerating next week, will increase to 60 mg. Cont zoloft at 150 mg/d for now with further taper in near future. Cont seroquel and klonopin per OP psychiatrist.      All current active medications have been reviewed. Continue treatment with group therapy and partial program      Risks / Benefits of Treatment:    Risks, benefits, and possible side effects of medications explained to patient and patient verbalizes understanding and agrees to medications. Counseling / Coordination of Care:    Patient's progress discussed with staff in treatment team meeting. Medications, treatment progress and treatment plan reviewed with patient.     Hilda Gomez PA-C 07/07/23

## 2023-07-07 NOTE — PSYCH
Visit Time    Visit Start Time: 5581  Visit Stop Time: 6806  Total Visit Duration: 16 minutes    Subjective:     Patient ID: Jasmin Acuña is a 59 y.o. female. [facility] Clinical Progress Notes      Specialized Services Documentation  Therapist must complete separate progress note for each specific clinical activity in which the individual participated during the day. Other Utilization Review: [phone] with Rosy Jeronimo at [facility]  phone [phone],  5 additional days authorized from 7/10/2023 to 7/14/2023. Authorization number [id number]. GIGI CandelarioBC      Case Management Note    Moe Cordero MT-BC    Current suicide risk : Low   This case management session was facilitated virtually in a private office using HIPAA Compliant and Approved [employer] Teams. Jasmin Acuña consented to the use of tele-video modality of treatment. Met with Jasmin Acuña to discuss weekend plans and supports. Jasmin Acuña identified biggest take away from the week is to begin to challenge her thoughts. She finds she is learning about herself and notices she gets stuck in negative thoughts. She would like to get out more. This weekend she has plans to connect to others through work and will do craft activity/computer games as self care. UR outcome reviewed. She shared WRAP her spouse printed and had bound for her. Goals for next week include monitor medication adjustment started today, encourage external supports, update treatment plan, offer family meeting. Medications changes/added/denied? Yes     Treatment session number: 5    Individual Case Management Visit provided today? Yes     [facility] follow up physician's orders: see ERIS Black PA-C note

## 2023-07-07 NOTE — PSYCH
Visit Time    Visit Start Time: 0930  Visit Stop Time: 4279  Total Visit Duration: 60 minutes    Subjective:     Patient ID: Tawanda Contreras is a 59 y.o. y.o. female. [facility] Clinical Progress Notes      Specialized Services Documentation  Therapist must complete separate progress note for each specific clinical activity in which the individual participated during the day. This group was facilitated virtually in a private office using HIPAA Compliant and Approved [employer] Teams. Tawanda Contreras consented to the use of tele-video modality of treatment. [facility]  Tawanda Contreras actively shared in music therapy group exploring DBT skill changing emotional responses. Alexa engaged in task sharing triggers and responses to given emotions. Group explored differences between justified and unjustified emotions to prompting events as well as effective versus ineffective responses. Group reviewed skill “Opposite Action” related to depression withdraw versus get active and productive choices offered. She identified that she would like to experience kathia this weekend. Some effort and progress noted toward goals. Continue AT to explore healthy emotional regulation and role in practicing wellness tools.    Tx Plan Objective: 1.1,1.2, Therapist:  Sandra BUTLER

## 2023-07-07 NOTE — PSYCH
Subjective:     Patient ID: Basilia Glass is a 59 y.o. female. [facility] Clinical Progress Notes      Specialized Services Documentation  Therapist must complete separate progress note for each specific clinical activity in which the individual participated during the day. Group Psychotherapy Life Skills ([id number]) Basilia Glass actively engaged in an open processing group which was facilitated virtually in a private office using HIPAA Compliant and Approved [employer] Teams. Alf Escobar consented to the use of tele-video modality of treatment and was virtually present for group psychotherapy today. The group discussed what they can do when they want to isolate and why they are here. Alf Escobar did participate in the conversations related to the topics. Alf Escobar continues to make progress towards goals through verbal participation in group; to accomplish long term goals continue to utilize skills learned in programming. Continue with psychotherapy to educate and encourage use of wellness tools. Tx Plan Objective: 1.1,1.2 Therapist: Jesenia Splinter     Education Therapy   [phone] Basilia Glass actively shared in morning assessment and goal review. Presented as Receptive related to readiness to learn. Basilia Glass did complete goal from last treatment day identifying hoping to gain responsibility . did not present with any barriers to learning. [id number] Basilia Glass engaged throughout the treatment day. Was engaged in learning related to Illness, Medication, Aftercare, and Wellness Tools. Staff utilized Verbal, Written, A/V, and Demonstration teaching methods. Basilia Glass shared area of learning and set a goal for outside of program to go food shopping and  medication.       Tx Plan Objective: 1.1,1.2,1.4 Therapist:  MARLEY Aragon, SARATH

## 2023-07-10 ENCOUNTER — OFFICE VISIT (OUTPATIENT)
Dept: PSYCHOLOGY | Facility: CLINIC | Age: 63
End: 2023-07-10
Payer: COMMERCIAL

## 2023-07-10 DIAGNOSIS — F33.2 MDD (MAJOR DEPRESSIVE DISORDER), RECURRENT SEVERE, WITHOUT PSYCHOSIS (HCC): Primary | Chronic | ICD-10-CM

## 2023-07-10 DIAGNOSIS — F41.1 GAD (GENERALIZED ANXIETY DISORDER): ICD-10-CM

## 2023-07-10 PROCEDURE — G0176 OPPS/PHP;ACTIVITY THERAPY: HCPCS

## 2023-07-10 PROCEDURE — G0177 OPPS/PHP; TRAIN & EDUC SERV: HCPCS

## 2023-07-10 PROCEDURE — G0410 GRP PSYCH PARTIAL HOSP 45-50: HCPCS

## 2023-07-10 NOTE — PSYCH
Virtual Regular Visit    Verification of patient location:    Patient is located at Home in the following state in which I hold an active license [place]      Assessment/Plan:    Problem List Items Addressed This Visit        Other    MDD (major depressive disorder), recurrent severe, without psychosis ([address]) - Primary (Chronic)    JACOBY (generalized anxiety disorder)       Goals addressed in session:           Reason for visit is No chief complaint on file. Encounter provider [address] [place] PARTIAL PSYCH PROGRAM    Provider located at [address] [address] [address]      Recent Visits  Date Type Provider Dept   07/07/23 Office Visit [address] [place] PARTIAL PSYCH GROUP THERAPY [facility]   07/06/23 Office Visit [address] [place] PARTIAL PSYCH GROUP THERAPY [facility]   07/05/23 Office Visit [address] [place] PARTIAL PSYCH GROUP THERAPY [facility]   07/03/23 Office Visit [address] [place] PARTIAL PSYCH GROUP THERAPY [facility]   Showing recent visits within past 7 days and meeting all other requirements  Today's Visits  Date Type Provider Dept   07/10/23 Office Visit [address] [place] PARTIAL PSYCH GROUP THERAPY [facility]   Showing today's visits and meeting all other requirements  Future Appointments  No visits were found meeting these conditions. Showing future appointments within next 150 days and meeting all other requirements       The patient was identified by name and date of birth. Rodríguez Aj was informed that this is a telemedicine visit and that the visit is being conducted [employer]. She agrees to proceed. .  My office door was closed. No one else was in the room. She acknowledged consent and understanding of privacy and security of the video platform. The patient has agreed to participate and understands they can discontinue the visit at any time. Patient is aware this is a billable service. Subjective  Zeinab Sheppard is a 59 y.o. female . HPI     Past Medical History:   Diagnosis Date   • Anxiety    • SVT (supraventricular tachycardia) ([address])        Past Surgical History:   Procedure Laterality Date   • CARDIAC ELECTROPHYSIOLOGY PROCEDURE N/A 05/30/2023    Procedure: Cardiac eps/svt ablation;  Surgeon: Antonio Ritter MD;  [facility]; Service: Cardiology   • JOINT REPLACEMENT  2010    right hip       Current Outpatient Medications   Medication Sig Dispense Refill   • clonazePAM (KlonoPIN) 1 mg tablet Take 1 mg by mouth 3 (three) times a day     • DULoxetine (Cymbalta) 30 mg delayed release capsule Take 1 capsule (30 mg total) by mouth daily 30 capsule 0   • QUEtiapine Fumarate (SEROQUEL PO) Take 50 mg by mouth 2 (two) times a day Was prescribed 100mg up to QID she reports, but dose not tolerated     • sertraline (Zoloft) 100 mg tablet Take 150mg daily, taper will be discussed with PHP provider       No current facility-administered medications for this visit. No Known Allergies    Review of Systems    Video Exam    There were no vitals filed for this visit. Physical Exam     Visit Time    I spent FOUR GROUP HOURS PLUS CASE MANAGEMENT minutes with patient today in which greater than 50% of the time was spent in counseling/coordination of care regarding PHP - SEE NOTES.

## 2023-07-10 NOTE — PSYCH
Subjective:     Patient ID: Johnnie Tovar is a 59 y.o. female. [facility] Clinical Progress Notes      Specialized Services Documentation  Therapist must complete separate progress note for each specific clinical activity in which the individual participated during the day. Group Psychotherapy  This group was facilitated virtually in a private office using HIPAA Compliant and Approved [employer] Teams. Johnnie Tovar  consented to the use of tele-video modality of treatment. ([id number]) Johnnie Tovar actively participated in psychoeducation group this afternoon which focused on sleep hygiene. Group then identified sleep hygiene habits that are currently effective and habits they wish to incorporate into their night time routine to promote sleep hygiene. This writer explained the importance of quality sleep in relation to wellness. Sleep diary and additional tips on sleep hygiene were discussed. Then, a video titled "How to Improve Your Sleep" was viewed. Good progress toward goal observed. Continue psychoeducation group to increase awareness of good sleeping habits to promote wellness. Tx Plan Objectives: 1.1, 1.2      Justine CRUZ RN        Education Therapy   [id number] Johnnie Tovar actively shared in morning assessment and goal review. Presented as Receptive related to readiness to learn. Johnnie Tovar did not complete goal from last treatment day wanting to gain responsibility. did not present with any barriers to learning. [phone] Johnnie Tovar engaged throughout the treatment day. Was engaged in learning related to Illness, Medication, Aftercare, and Wellness Tools. Staff utilized Verbal, Written, A/V, and Demonstration teaching methods. Johnnie Tovar shared area of learning and set a goal for outside of program to be more active.       Tx Plan Objective: 1.1,1.2,1.4 Therapist:  Kathleen CRUZ RN

## 2023-07-10 NOTE — PSYCH
Subjective:     Patient ID: Clide Bosworth is a 59 y.o. female. [facility] Clinical Progress Notes      Specialized Services Documentation  Therapist must complete separate progress note for each specific clinical activity in which the individual participated during the day. Group Psychotherapy Life Skills ([id number])  Clide Bosworth actively engaged in group focused on core beliefs which was facilitated virtually in a private office using HIPAA Compliant and Approved [employer] Teams. Prabhu Ardon consented to the use of tele-video modality of treatment and was virtually present for group psychotherapy today. Group members watched a short video, Healing Your Negative Core Beliefs. Group members learned and share about their core beliefs. The group discussed how core beliefs influence their thoughts and behaviors. During the activity the group learned about cognitive reframing and explored ways to reshape their negative core beliefs into positive beliefs. Group members wrote one of their negative core beliefs and had to provide three pieces of evidence that prove the negative core belief untrue. The group participated in changing a negative thought into a positive thought. Discussed thought records and how to use them. Group members were asked to challenge one negative thought per day. Prabhu Ardon continues to make progress towards goals through verbal participation in group; to accomplish long term goals continue to utilize skills learned in programming. Continue with psychotherapy to educate and encourage use of wellness tools.  Tx Plan Objective: 1.1,1.2 Therapist: Lai Norton

## 2023-07-10 NOTE — PSYCH
Visit Time    Visit Start Time: 5564  Visit Stop Time: 8879  Total Visit Duration: 60 minutes     Subjective:     Patient ID: Naima Wood is a 59 y.o. y.o.female. [facility] Clinical Progress Notes      Specialized Services Documentation  Therapist must complete separate progress note for each specific clinical activity in which the individual participated during the day. This group was facilitated virtually in a private office using HIPAA Compliant and Approved [employer] Teams. Naima Wood consented to the use of tele-video modality of treatment. Allied Therapy     Naima Wood actively shared in [facility] group focused on distress tolerance skill “self-soothe”. Alexa was observed to be engaged in therapist led visualization. Group discussed specific items that could help self soothe if in an “anxiety crisis” with encouragement to use these things regularly to manage stressors consistently. “STOP” and “TIP” skills also reviewed. She identified she would put music, a blanket, and chocolate  in  a “self soothe kit”. Some positive effort noted toward tx goal.  Continue AT to encourage development of wellness skills and consistent practice.    Tx Plan Objective: 1.1 Therapist:  Robbie BUTLER

## 2023-07-10 NOTE — PSYCH
Visit Time    Visit Start Time: 6104  Visit Stop Time: 2816  Total Visit Duration: 17 minutes    Subjective:     Patient ID: Nick Georges is a 59 y.o. female. [facility] Clinical Progress Notes      Specialized Services Documentation  Therapist must complete separate progress note for each specific clinical activity in which the individual participated during the day. Case Management Note    Ad [facility]    Current suicide risk : Low      Met with Nick Georges. Reported a success from the weekend being going to work. She identified she enjoys it however struggles to get there as she wakes up in the morning with physical symptoms from her anxiety (stomach, headache). She frequently can talk herself into going to work. When asked what she would like to focus on this week, she did stated "dealing with the what ifs, fear of the future, and guilt from the past". Discussed worry time and putting "worry into action". She was very able to share what she is doing to help herself age gracefully. Reviewed med check and schedule for the week. Medications changes/added/denied? No    Treatment session number: 6    Individual Case Management Visit provided today?  Yes     [facility] follow up physician's orders: na

## 2023-07-11 ENCOUNTER — OFFICE VISIT (OUTPATIENT)
Dept: PSYCHOLOGY | Facility: CLINIC | Age: 63
End: 2023-07-11
Payer: COMMERCIAL

## 2023-07-11 DIAGNOSIS — F41.1 GAD (GENERALIZED ANXIETY DISORDER): ICD-10-CM

## 2023-07-11 DIAGNOSIS — F45.22 BODY DYSMORPHIC DISORDER: ICD-10-CM

## 2023-07-11 DIAGNOSIS — F41.0 PANIC DISORDER WITHOUT AGORAPHOBIA: ICD-10-CM

## 2023-07-11 DIAGNOSIS — F33.2 MDD (MAJOR DEPRESSIVE DISORDER), RECURRENT SEVERE, WITHOUT PSYCHOSIS (HCC): Primary | Chronic | ICD-10-CM

## 2023-07-11 DIAGNOSIS — F40.10 SOCIAL ANXIETY DISORDER: ICD-10-CM

## 2023-07-11 PROCEDURE — G0177 OPPS/PHP; TRAIN & EDUC SERV: HCPCS

## 2023-07-11 PROCEDURE — G0410 GRP PSYCH PARTIAL HOSP 45-50: HCPCS

## 2023-07-11 PROCEDURE — G0176 OPPS/PHP;ACTIVITY THERAPY: HCPCS

## 2023-07-11 NOTE — PSYCH
Visit Time    Visit Start Time: 0930  Visit Stop Time: 1927  Total Visit Duration: 60 minutes    Subjective:     Patient ID: Elaine Reese is a 59 y.o. y.o. female. [facility] Clinical Progress Notes      Specialized Services Documentation  Therapist must complete separate progress note for each specific clinical activity in which the individual participated during the day. This group was facilitated virtually in a private office using HIPAA Compliant and Approved Microsoft Teams. Elaine Reese consented to the use of tele-video modality of treatment. Allied Therapy  Elaine Reese actively shared in [facility] group focused on balancing healthy solitude and making connections with others. Alexa was encouraged to identify value of solitude, risks to solitude, and exploration of DBT concept of finding/making friends. Adele analysis encouraged the discussion of balancing time alone versus hiding from others. She asked to share a strength that can be utilized to help self and offer to others and she shared caring for others. Alexa was engaged throughout the session and shared that she is aware of her need to make new connections and the risk solitude has in her life. Positive progress voiced toward goal.  Continue psychotherapy to engage in the development and practice of wellness tools.     Tx Plan Objective: 1.1, 1.4 Therapist: LUKE Valle

## 2023-07-11 NOTE — PSYCH
Subjective:     Patient ID: Nick Georges is a 59 y.o. female. [facility] Clinical Progress Notes      Specialized Services Documentation  Therapist must complete separate progress note for each specific clinical activity in which the individual participated during the day. Case Management Note    Ad [facility]    Current suicide risk : Low     Not a case management day for Nick Georges today. Did not reach out with any additional questions and concerns and aware of next scheduled 1:1.      Medications changes/added/denied? No    Treatment session number: 7    Individual Case Management Visit provided today?  No    [facility] follow up physician's orders: na

## 2023-07-11 NOTE — PSYCH
Virtual Regular Visit    Verification of patient location:    Patient is located at Home in the following state in which I hold an active license [place]      Assessment/Plan:    Problem List Items Addressed This Visit        Other    MDD (major depressive disorder), recurrent severe, without psychosis ([address]) - Primary (Chronic)    JACOBY (generalized anxiety disorder)    PROVISIONAL Body dysmorphic disorder    Panic disorder without agoraphobia    Social anxiety disorder       Goals addressed in session:           Reason for visit is PHP VIRTUAL GROUP DUE TO virtual preference of care      Encounter provider [facility], [address] [place] [address]    [place] [address] [address] [id number]      Recent Visits  Date Type Provider Dept   07/10/23 Office Visit [facility] PARTIAL PSYCH GROUP THERAPY [facility]   07/07/23 Office Visit [facility] PARTIAL PSYCH GROUP THERAPY [facility]   07/06/23 Office Visit [facility] PARTIAL [facility]   07/05/23 Office Visit [facility] PARTIAL PSYCH GROUP THERAPY [facility]   Showing recent visits within past 7 days and meeting all other requirements  Future Appointments  No visits were found meeting these conditions. Showing future appointments within next 150 days and meeting all other requirements       The patient was identified by name and date of birth. Tez Steele was informed that this is a telemedicine visit and that the visit is being conducted [employer]. She agrees to proceed. .  My office door was closed. No one else was in the room. She acknowledged consent and understanding of privacy and security of the video platform. The patient has agreed to participate and understands they can discontinue the visit at anytime . Patient is aware this is a billable service. Subjective  Tez Steele is a 59 y.o. female  . HPI     Past Medical History:   Diagnosis Date   • Anxiety    • SVT (supraventricular tachycardia) ([address])        Past Surgical History:   Procedure Laterality Date   • CARDIAC ELECTROPHYSIOLOGY PROCEDURE N/A 05/30/2023    Procedure: Cardiac eps/svt ablation;  Surgeon: Mely Raymundo MD;  Location:  CARDIAC CATH LAB; Service: Cardiology   • JOINT REPLACEMENT  2010    right hip       Current Outpatient Medications   Medication Sig Dispense Refill   • clonazePAM (KlonoPIN) 1 mg tablet Take 1 mg by mouth 3 (three) times a day     • DULoxetine (Cymbalta) 30 mg delayed release capsule Take 1 capsule (30 mg total) by mouth daily 30 capsule 0   • QUEtiapine Fumarate (SEROQUEL PO) Take 50 mg by mouth 2 (two) times a day Was prescribed 100mg up to QID she reports, but dose not tolerated     • sertraline (Zoloft) 100 mg tablet Take 150mg daily, taper will be discussed with PHP provider       No current facility-administered medications for this visit. No Known Allergies    Review of Systems    Video Exam    There were no vitals filed for this visit.     Physical Exam     I spent FOUR GROUP HOURS PLUS CASE MANAGEMENT minutes with patient today in which greater than 50% of time was spent in counseling/coordination of care regarding PHP - SEE NOTES

## 2023-07-11 NOTE — PSYCH
Subjective:     Patient ID: Varney Boeck is a 59 y.o. female. [facility] Clinical Progress Notes      Specialized Services Documentation  Therapist must complete separate progress note for each specific clinical activity in which the individual participated during the day. Group Psychotherapy  Life Skills  ([id number])  Varney Boeck actively engaged in group focused on anxiety and stress solutions which was facilitated virtually in a private office using HIPAA Compliant and Approved [employer] Teams. Dawn Boston consented to the use of tele-video modality of treatment and was virtually present for group psychotherapy today. Group members practiced CBT and Mindfulness strategies to manage stress by completing short-activities drawn from "The Anxiety and Stress Solution Deck". Group members got to choose a card from one of the four focused areas of CBT: challenge your thoughts, change your behaviors, clarify your feelings, and create calmness. Dawn Leonardoed chose a card from the Challenge your thoughts pile and practiced the tools listed on the card. Group members were encouraged to provide feedback on the exercise. Dawnpedro Boston continues to make progress towards goals through verbal participation in group; to accomplish long term goals continue to utilize skills learned in programming. Continue with psychotherapy to educate and encourage use of wellness tools.  Tx Plan Objective: 1.1,1.2 Therapist: Joi Denis

## 2023-07-11 NOTE — PSYCH
Visit Time    Visit Start Time: 3814  Visit Stop Time: 1330  Total Visit Duration: 60 minutes    Subjective:     Patient ID: Sharon Tellez is a 59 y.o. female. [facility] Clinical Progress Notes      Specialized Services Documentation  Therapist must complete separate progress note for each specific clinical activity in which the individual participated during the day. Group Psychotherapy - Cognitive Distortions   This group was facilitated virtually in a private office using HIPAA compliant and approved [employer] teams. Sharon Tellez consented to the use of tele-video modality of treatment. Sharon Tellez attended group on cognitive distortions. Members reviewed and discussed the 15 types of cognitive distortions. This writer facilitated a discussion on distortion triggers, the associated experience of emotions, and which distortions resonated with each member. Members evaluated their cognitive distortions and which thy use most frequently. Members were provided three worksheets to begin challenging their cognitive distortions outside of program. This writer encouraged members to actively seek out and challenge distorted thoughts using these handouts. Sharon Tellez made good efforts towards progress goals which were displayed through participation, notetaking, and engagement in topic. Meli Boateng frequently participated in the discussion. She became a little off topic as she began to ask group members about how they respond to people talking about them. Tx Plan Objective: 1.1,1.2,1.4 Therapist: Sienna Corona [facility]     Education Therapy   [id number] Sharon Tellez actively shared in morning assessment and goal review. Presented as Receptive related to readiness to learn. Sharon Tellez did complete goal from last treatment day identifying gaining education. did not present with any barriers to learning. [id number] Sharon Tellez engaged throughout the treatment day.  Was engaged in learning related to Illness, Medication, Aftercare, and Wellness Tools. Staff utilized Verbal, Written, A/V, and Demonstration teaching methods. Erna Garcia shared area of learning and set a goal for outside of program to relax.       Tx Plan Objective: 1.1,1.2,1.4 Therapist: Betty Villanueva, Sienna Abbott Michael

## 2023-07-11 NOTE — PROGRESS NOTES
Electrophysiology Follow Up  Heart & Vascular [address]  [facility] [place]    Name: Yann Valentine  : 1960  MRN: [id number]    ASSESSMENT/PLAN:    1. AVNRT s/p successful slow pathway modification 2023 by Dr. Murphy Schuster  -- palpitations for 30 years  -- short RP tachycardia, terminated with adenosine  -- diltiazem for breakthrough  -- now off AVN agents s/p ablation  2. Preserved LVEF 55%  3. Anxiety  4. H/o right GIA    Yann Valentine is doing very well after her recent ablation for AVNRT. She underwent slow pathway modification by Dr. Murphy Schuster. Diltiazem was discontinued postprocedure. She has not had any recurrent palpitations. EKG today documents sinus rhythm with a heart rate of 80 bpm.  Her blood pressure is well controlled. She has resumed normal activity without any limitations. Her groin sites are well-healed. She is very happy with the way she is feeling. We discussed symptoms related to arrhythmias. We discussed ways to monitor her heart rates. She was offered follow-up with Dr. Murphy Schuster but prefers to follow-up with Dr. Pastor Alfaro given the proximity to her house. She will follow-up with Dr. Pastor Alfaro in approximately 6 months and perhaps yearly thereafter. She will call our office with any recurrence of symptoms and will follow-up as needed with Dr. Murphy Schuster. CC/HPI:      Yann Valentine is a 59 y.o. female with SVT and anxiety disorder who underwent ablation of AVNRT and now presents for follow up. She established care with Dr. Pastor Alfaro in 2020 for longstanding history of palpitations related to SVT. She reported increased frequency of SVT episodes associated with shortness of breath and palpitations. In the past, the arrhythmia terminated with vagal maneuvers but this was now less effective. She required treatment in the ED for one episode and received adenosine which terminated the arrhythmia.  She was started on diltiazem with some improvement in her symptoms. Ablation was discussed in her 35s but she declined at the time. Echocardiogram in January 2023 documented an LVEF of 55%. Nuclear stress test in January 2023 showed no evidence of ischemia. She was evaluated by Dr. Ave Saeed on 3/16/23. EKGs documented short RP tachycardia. SVT ablation was recommended and she elected to proceed. On 5/30/2023, she underwent successful ablation of atypical AVNRT with slow pathway modification by Dr. Ave Saeed. Diltiazem was discontinued post procedure and she was discharged home. She returns today accompanied by her . She has been feeling very well since the ablation. She is very happy with the way she feels. She has not had recurrence of palpitations. She denies chest pain, shortness of breath, lightheadedness, dizziness, presyncope, or syncope. She denies any groin pain. CARDIAC TESTING:    EKG 7/13/2023: normal sinus rhythm HR 80 bpm     ECHO 1/6/2023:   •  Left Ventricle: Left ventricular cavity size is normal. Wall thickness is normal. Systolic function is normal (55%). Wall motion is normal. Diastolic function is normal.  •  Right Ventricle: Right ventricular cavity size is normal. Systolic function is normal.  •  Mitral Valve: There is mild regurgitation. •  Tricuspid Valve: There is mild regurgitation. The right ventricular systolic pressure is normal.  •  Pulmonic Valve: There is trace regurgitation. NUCLEAR STRESS TEST 1/13/2023:   •  Resting ECG: The ECG shows normal sinus rhythm. •  Stress ECG: Arrhythmias during stress: rare PACs. The ECG was not diagnostic due to pharmacological (vasodilator) stress. •  Perfusion: There is a left ventricular perfusion defect that is small in size present in the apical anteroseptal location(s) that is paradoxical.  •  Perfusion Defect Conclusion: The stress/rest perfusion ratio is 1.04 . There is no evidence of transient ischemic dilation (TID).   • Stress Function: Left ventricular function post-stress is normal. Post-stress ejection fraction is 66 %. Normal study after pharmacologic vasodilation. There was image artifact without diagnostic evidence of perfusion abnormality. Left ventricular function was preserved. Review of Systems   Constitutional: Negative. HENT: Negative. Eyes: Negative. Respiratory: Negative for chest tightness, shortness of breath and wheezing. Cardiovascular: Negative for chest pain and palpitations. Gastrointestinal: Negative for abdominal pain, nausea and vomiting. Endocrine: Negative. Genitourinary: Negative. Musculoskeletal: Negative. Skin: Negative for rash. Neurological: Negative for dizziness, syncope and weakness. OBJECTIVE:   Vitals:   /70 (BP Location: Left arm, Patient Position: Sitting, Cuff Size: Large)   Pulse 79   Wt 84.4 kg (186 lb)   BMI 29.13 kg/m²   Body mass index is 29.13 kg/m². Physical Exam:   GEN: NAD, alert and oriented x 3, well appearing  SKIN: warm, dry without significant lesions or rashes  HEENT: NCAT, PERRL, EOMs intact  NECK: supple, no JVD appreciated  CARDIOVASCULAR: RRR, normal S1, S2 without murmurs, rubs, or gallops   LUNGS: CTA bilaterally without wheezes, rhonchi, or rales  ABDOMEN: Soft, nontender, nondistended.    EXTREMITIES/VASCULAR: perfused without clubbing, cyanosis, or LE edema b/l  PSYCH: Normal mood and affect  NEURO: CN ll-Xll grossly intact      Medications:      Current Outpatient Medications:   •  clonazePAM (KlonoPIN) 1 mg tablet, Take 1 mg by mouth 3 (three) times a day, Disp: , Rfl:   •  DULoxetine (Cymbalta) 30 mg delayed release capsule, Take 2 capsules (60 mg total) by mouth daily, Disp: , Rfl:   •  sertraline (Zoloft) 100 mg tablet, Take 1 tablet (100 mg total) by mouth daily, Disp: , Rfl:   •  QUEtiapine Fumarate (SEROQUEL PO), Take 50 mg by mouth 2 (two) times a day Was prescribed 100mg up to QID she reports, but dose not tolerated, Disp: , Rfl:        Historical Information   Past Medical History:   Diagnosis Date   • Anxiety    • SVT (supraventricular tachycardia) ([address])        Past Surgical History:   Procedure Laterality Date   • CARDIAC ELECTROPHYSIOLOGY PROCEDURE N/A 05/30/2023    Procedure: Cardiac eps/svt ablation;  Surgeon: Praveen Mcdonald MD;  Location: [facility] CARDIAC CATH LAB; Service: Cardiology   • JOINT REPLACEMENT  2010    right hip       Social History     Substance and Sexual Activity   Alcohol Use Not Currently     Social History     Substance and Sexual Activity   Drug Use Not Currently    Comment: medical marijuana     Social History     Tobacco Use   Smoking Status Every Day   • Packs/day: 1.00   • Types: Cigarettes   Smokeless Tobacco Never       Family History   Problem Relation Age of Onset   • Depression Mother    • Anxiety disorder Mother    • Alcohol abuse Father    • Depression Brother    • Anxiety disorder Brother        Labs & Results:  Below is the patient's most recent value for Albumin, ALT, AST, BUN, Calcium, Chloride, Cholesterol, CO2, Creatinine, GFR, Glucose, HDL, Hematocrit, Hemoglobin, Hemoglobin A1C, LDL, Magnesium, Phosphorus, Platelets, Potassium, PSA, Sodium, Triglycerides, and WBC. Lab Results   Component Value Date    ALT 23 05/05/2023    AST 16 05/05/2023    BUN 13 05/30/2023    CALCIUM 8.8 05/30/2023     (H) 05/30/2023    CO2 26 05/30/2023    CREATININE 0.87 05/30/2023    HCT 39.8 05/30/2023    HGB 13.3 05/30/2023    HGBA1C 6.1 (H) 02/15/2021    MG 2.1 02/25/2023    PHOS 3.0 11/26/2017     05/30/2023    K 4.2 05/30/2023    WBC 10.33 (H) 05/30/2023     Note: for a comprehensive list of the patient's lab results, access the Results Review activity.

## 2023-07-12 ENCOUNTER — OFFICE VISIT (OUTPATIENT)
Dept: PSYCHOLOGY | Facility: CLINIC | Age: 63
End: 2023-07-12
Payer: COMMERCIAL

## 2023-07-12 DIAGNOSIS — F41.1 GAD (GENERALIZED ANXIETY DISORDER): ICD-10-CM

## 2023-07-12 DIAGNOSIS — F33.2 MDD (MAJOR DEPRESSIVE DISORDER), RECURRENT SEVERE, WITHOUT PSYCHOSIS (HCC): Primary | Chronic | ICD-10-CM

## 2023-07-12 PROCEDURE — G0176 OPPS/PHP;ACTIVITY THERAPY: HCPCS

## 2023-07-12 PROCEDURE — G0410 GRP PSYCH PARTIAL HOSP 45-50: HCPCS

## 2023-07-12 PROCEDURE — 90832 PSYTX W PT 30 MINUTES: CPT

## 2023-07-12 NOTE — PSYCH
Subjective:     Patient ID: Clide Bosworth is a 59 y.o. female. [facility] Clinical Progress Notes      Specialized Services Documentation  Therapist must complete separate progress note for each specific clinical activity in which the individual participated during the day. Group Psychotherapy Life Skills ([id number]) Clide Bosworth actively engaged in group focused on the drama triangle which was facilitated virtually in a private office using HIPAA Compliant and Approved Microsoft Teams. Prabhu Ardon consented to the use of tele-video modality of treatment and was virtually present for group psychotherapy today. The group learned about the drama triangle and the roles of the victim, rescuer, and persecutor. Prabhu Ardon identified having been in the role of a victim and persecutor . During the activity participants learned how to stop the drama triangle from continuing. Group members identified ways that they would stop the drama triangle from occuring. Prabhu Ardon continues to make progress towards goals through verbal participation in group; to accomplish long term goals continue to utilize skills learned in programming. Continue with psychotherapy to educate and encourage use of wellness tools. Tx Plan Objective: 1.1,1.2 Therapist: SARATH Wu    Education Therapy   [id number] Clide Bosworth actively shared in morning assessment and goal review. Presented as Receptive related to readiness to learn. Clide Bosworth did complete goal from last treatment day identifying gaining education. did not present with any barriers to learning. [id number] Clide Bosworth engaged throughout the treatment day. Was engaged in learning related to Illness, Medication, Aftercare, and Wellness Tools. Staff utilized Verbal, Written, A/V, and Demonstration teaching methods. Clide Bosworth shared area of learning and set a goal for outside of program to relax and work on her wrap.       Tx Plan Objective: 1.1,1.2,1.4 Therapist:  Tawny Score, MSW, LSW

## 2023-07-12 NOTE — PSYCH
Virtual Regular Visit    Verification of patient location:    Patient is located at Home in the following state in which I hold an active license [place]      Assessment/Plan:    Problem List Items Addressed This Visit        Other    MDD (major depressive disorder), recurrent severe, without psychosis ([address]) - Primary (Chronic)    JACOBY (generalized anxiety disorder)       Goals addressed in session:           Reason for visit is PHP VIRTUAL GROUP DUE TO virtual preference of care         Encounter provider [facility], [address] [place] [address]    Provider located at [address] [address] [address]      Recent Visits  Date Type Provider Dept   07/11/23 Office Visit [facility] PARTIAL PSYCH GROUP THERAPY [facility]   07/10/23 Office Visit [facility] PARTIAL PSYCH GROUP THERAPY [facility]   07/07/23 Office Visit [facility] PARTIAL PSYCH GROUP THERAPY [facility]   07/06/23 Office Visit [facility] PARTIAL PSYCH GROUP THERAPY [facility]   07/05/23 Office Visit [facility] PARTIAL PSYCH GROUP THERAPY [facility]   Showing recent visits within past 7 days and meeting all other requirements  Today's Visits  Date Type Provider Dept   07/12/23 Office Visit [facility] PARTIAL PSYCH GROUP THERAPY [facility]   Showing today's visits and meeting all other requirements  Future Appointments  No visits were found meeting these conditions. Showing future appointments within next 150 days and meeting all other requirements       The patient was identified by name and date of birth. Tez Steele was informed that this is a telemedicine visit and that the visit is being conducted [employer]. She agrees to proceed. .  My office door was closed. No one else was in the room. She acknowledged consent and understanding of privacy and security of the video platform.  The patient has agreed to participate and understands they can discontinue the visit at any time. Patient is aware this is a billable service. Subjective  Pretty De Jesus is a 59 y.o. female  . HPI     Past Medical History:   Diagnosis Date   • Anxiety    • SVT (supraventricular tachycardia) ([address])        Past Surgical History:   Procedure Laterality Date   • CARDIAC ELECTROPHYSIOLOGY PROCEDURE N/A 05/30/2023    Procedure: Cardiac eps/svt ablation;  Surgeon: Filipe Cho MD;  [facility]; Service: Cardiology   • JOINT REPLACEMENT  2010    right hip       Current Outpatient Medications   Medication Sig Dispense Refill   • clonazePAM (KlonoPIN) 1 mg tablet Take 1 mg by mouth 3 (three) times a day     • DULoxetine (Cymbalta) 30 mg delayed release capsule Take 1 capsule (30 mg total) by mouth daily 30 capsule 0   • QUEtiapine Fumarate (SEROQUEL PO) Take 50 mg by mouth 2 (two) times a day Was prescribed 100mg up to QID she reports, but dose not tolerated     • sertraline (Zoloft) 100 mg tablet Take 150mg daily, taper will be discussed with PHP provider       No current facility-administered medications for this visit. No Known Allergies    Review of Systems    Video Exam    There were no vitals filed for this visit. Physical Exam     I spent FOUR GROUP HOURS PLUS CASE MANAGEMENT minutes with patient today in which greater than 50% of the time was spent in counseling/coordination of care regarding PHP - SEE NOTES.

## 2023-07-12 NOTE — PSYCH
Visit Time    Visit Start Time: 1320  Visit Stop Time: 5033  Total Visit Duration: 17 minutes    Subjective:     Patient ID: Basilia Glass is a 59 y.o. female. [facility] Clinical Progress Notes      Specialized Services Documentation  Therapist must complete separate progress note for each specific clinical activity in which the individual participated during the day. Case Management Note    Collette Faith [facility]    Current suicide risk : Low     This case management session was facilitated virtually in a private office using HIPAA Compliant and Approved [employer] Teams. Basilia Glass consented to the use of tele-video modality of treatment. Met with Basilia Glass. Reported physical symptoms of anxiety this morning - which she struggles with consistently. She felt better after PMR - encouraged her to use this every morning. She is taking lots of notes and encouraged the application. Discussed and updated treatment plan with addition of goal to work on [employer]. She will be off tomorrow to attend a cardiology appointment but will attend her med check. I,Lniette Davila,am physically unable to provide a signature; however, I understand the nature of and am in agreement with the documentation presented to me via TEAMS. I have received a copy through My Chart and/or the [employer]. I freely give verbal consent. Name of Document (s): treatment plan  Witness to verbal consent: Collette Faith  Witness to verbal consent: Clarisa Hinojosa       Medications changes/added/denied? No    Treatment session number: 8    Individual Case Management Visit provided today?  Yes     [facility] follow up physician's orders: na

## 2023-07-12 NOTE — BH TREATMENT PLAN
Assessment/Plan:         Encounter Diagnoses   Name Primary? • MDD (major depressive disorder), recurrent severe, without psychosis ([address]) Yes   • JACOBY (generalized anxiety disorder)       Subjective:     Patient ID: Zeinab Sheppard is a 59 y.o. female. [facility] Treatment Plan   AREAS OF NEED: Depression and anxiety as evidenced by excessive worry, panic, isolation, lack of enjoyment, decrease in energy, sleep disturbance, hopelessness, trouble concentrating related to fears of aging and death of horses. Date Initiated: 6/30/2023    Strengths: family support, enjoys going to work, willing to reach out for help     LONG TERM GOAL:   Date Initiated: 6/30/2023  1.0 I will identify 3 signs that my anxiety has decreased in intensity and frequency and am more active in my day to day life. Target Date: 7/28/2023  Completion Date:     SHORT TERM OBJECTIVES:     Date Initiated: 6/30/2023  1.1 I will identify 3 mindfulness and/or distress tolerance skills I can use to manage fear thoughts  Revision Date: continue 7/12/2023  Target Date: 7/12/2023  Completion Date:     Date Initiated: 6/30/2023  1.2 I will identify 3 activities from Adult [employer] list I can do and pick 1 daily to engage in  Revision Date: continue 7/12/2023  Target Date: 7/12/2023  Completion Date:     Date Initiated: 6/30/2023  1.3 I will take medications as prescribed and share questions and concerns if arise. Revision Date: continue 7/12/2023  Target Date: 7/12/2023  Completion Date:      Date Initiated: 6/30/2023  1.4 I will identify 3 ways my supports can assist in my recovery and agree to staff/support contact as indicated. Revision Date: continue 7/12/2023  Target Date: 7/12/2023  Completion Date:            7 DAY REVISION:    Date Initiated: 7/12/2023  1.5 I will review my notes and add to my WRAP for at least 10 minutes daily reinforcing and practicing my gains.   Revision Date:   Target Date:   Completion Date:      PSYCHIATRY:  Date Initiated: 6/30/2023  Medication Management and Education       Revision Date: 7/12/2023        Continue medication management   The person(s) responsible for carrying out the plan is ELIA Calero PA-C    NURSING/SYMPTOM EDUCATION:  Date Initiated: 6/30/2023  1.1, 1.2. 1.3, 1.4 This RN will provide wellness/symptoms and skill education groups three to five days weekly to educate Jeana Hopson on signs and symptoms of diagnoses, skills to manage, and medication questions that will be addressed by the treatment team.    Revision date: 7/12/2023  1.1,1.2,1.3,1.4,1.5 Continue to encourage Jeana Hopson to participate in wellness groups daily to learn about symptoms, coping strategies and warning signs to promote relapse prevention. The person(s) responsible for carrying out the plan is Yamini Serrato RN    PSYCHOLOGY:   Date Initiated: 6/30/2023       1.1, 1.2, 1.4 Provide psychotherapy group 5 times per week to allow opportunity for Jeana Hopson  to explore stressors and ways of coping. Revision Date: 7/12/2023  1.1,1.2,1.4,1.5 Continue to provide psychotherapy group daily to [address] [place] [address] encourage sharing of stressors, skills and positive change. The person(s) responsible for carrying out the plan is SARATH Fiore, [facility]    ALLIED THERAPY:   Date Initiated: 6/30/2023  1.1,1.2 Engage Jeana Hopson in AT group 5 times daily to encourage development and use of wellness tools to decrease symptoms and promote recovery through meaningful activity. Revision Date: 7/12/2023  1.1,1.2,1.5 Continue to engage Jeana Hopson to participate in AT group to practice wellness tools within program and transfer to home sharing successes and barriers through healthy task involvement.    The person(s) responsible for carrying out the plan is Anderson Briceño [facility]    CASE MANAGEMENT:   Date Initiated: 6/30/2023      1.0 This  will meet with Eugene Vale  3-4 times weekly to assess treatment progress, discharge planning, connection to community supports and UR as indicated. Revision Date: 7/12/2023  1.0 Continue to meet with Eugene Vale 3-4 times weekly to assess growth, work toward goals, continued treatment needs, dc planning and use of supports. The person(s) responsible for carrying out the plan is Liuza Burciaga [facility]      TREATMENT REVIEW/COMMENTS:   DISCHARGE CRITERIA:Identify 3 signs of progress and complete relapse prevention plan. DISCHARGE PLAN: OP care  Estimated Length of Stay:10 treatment days  Diagnosis and Treatment Plan explained to Sabrina Messina relates understanding diagnosis and is agreeable to Treatment Plan. CLIENT COMMENTS / Please share your thoughts, feelings, need and/or experiences regarding your treatment plan with Staff. Please see follow up note with comments. Signatures can be found on [facility] Treatment plan consent form. Azithromycin Counseling:  I discussed with the patient the risks of azithromycin including but not limited to GI upset, allergic reaction, drug rash, diarrhea, and yeast infections.

## 2023-07-12 NOTE — PSYCH
Subjective:     Patient ID: Yoana Johnson is a 59 y.o. female. [facility] Clinical Progress Notes      Specialized Services Documentation  Therapist must complete separate progress note for each specific clinical activity in which the individual participated during the day. GROUP PSYCHOTHERAPY  ([id number]) Group was facilitated virtually in a private office using HIPAA Compliant and Approved [employer] Teams. Yoana Johnson consented to the use of tele-video modality of treatment and was virtually present for group psychotherapy today. she attentively listened to [id number] [facility] [address] share her life story as she co-led this session. Group encouraged power of learning about self, accepting illness and personal responsibility in recovery. Community resources reviewed in addition to personal resources like the affirmations. Progress toward goals noted. Continue psychotherapy to encourage self -awareness and healthy engagement of supports.     [place] Plan Objectives: 1.1, 1.2, 1.4   Therapist: Lino CRUZ RN

## 2023-07-13 ENCOUNTER — OFFICE VISIT (OUTPATIENT)
Dept: CARDIOLOGY CLINIC | Facility: CLINIC | Age: 63
End: 2023-07-13
Payer: COMMERCIAL

## 2023-07-13 ENCOUNTER — APPOINTMENT (OUTPATIENT)
Dept: PSYCHOLOGY | Facility: CLINIC | Age: 63
End: 2023-07-13
Payer: COMMERCIAL

## 2023-07-13 ENCOUNTER — DOCUMENTATION (OUTPATIENT)
Dept: PSYCHOLOGY | Facility: CLINIC | Age: 63
End: 2023-07-13

## 2023-07-13 ENCOUNTER — TELEMEDICINE (OUTPATIENT)
Dept: PSYCHIATRY | Facility: CLINIC | Age: 63
End: 2023-07-13

## 2023-07-13 VITALS
BODY MASS INDEX: 29.13 KG/M2 | HEART RATE: 79 BPM | DIASTOLIC BLOOD PRESSURE: 70 MMHG | WEIGHT: 186 LBS | SYSTOLIC BLOOD PRESSURE: 115 MMHG

## 2023-07-13 DIAGNOSIS — F41.0 PANIC DISORDER WITHOUT AGORAPHOBIA: ICD-10-CM

## 2023-07-13 DIAGNOSIS — F41.1 GAD (GENERALIZED ANXIETY DISORDER): ICD-10-CM

## 2023-07-13 DIAGNOSIS — I47.1 SVT (SUPRAVENTRICULAR TACHYCARDIA) (HCC): Primary | ICD-10-CM

## 2023-07-13 DIAGNOSIS — F40.10 SOCIAL ANXIETY DISORDER: ICD-10-CM

## 2023-07-13 DIAGNOSIS — F33.2 MDD (MAJOR DEPRESSIVE DISORDER), RECURRENT SEVERE, WITHOUT PSYCHOSIS (HCC): ICD-10-CM

## 2023-07-13 DIAGNOSIS — F45.22 BODY DYSMORPHIC DISORDER: ICD-10-CM

## 2023-07-13 PROCEDURE — 99213 OFFICE O/P EST LOW 20 MIN: CPT | Performed by: PHYSICIAN ASSISTANT

## 2023-07-13 RX ORDER — DULOXETIN HYDROCHLORIDE 30 MG/1
60 CAPSULE, DELAYED RELEASE ORAL DAILY
COMMUNITY
Start: 2023-07-13 | End: 2023-07-19 | Stop reason: SDUPTHER

## 2023-07-13 RX ORDER — SERTRALINE HYDROCHLORIDE 100 MG/1
75 TABLET, FILM COATED ORAL DAILY
COMMUNITY
Start: 2023-07-13

## 2023-07-13 NOTE — PROGRESS NOTES
Subjective:     Patient ID: Jasmin Acuña is a 59 y.o. female. [facility] Clinical Progress Notes      Specialized Services Documentation  Therapist must complete separate progress note for each specific clinical activity in which the individual participated during the day. Case Management Note    Moe YUBC    Current suicide risk : Low     Jasmin Acuña excused related to cardiology appointment - did have med check today. Medications changes/added/denied? Yes     Treatment session number: na    Individual Case Management Visit provided today? No    [facility] follow up physician's orders: see ERIS Black PA-C note

## 2023-07-13 NOTE — PSYCH
Virtual Regular Visit    Verification of patient location:    Patient is located at Home in the following state in which I hold an active license [place]              Encounter provider Elisa Traylor PA-C    Provider located at  [address] [address] [address] [phone]      Recent Visits  Date Type Provider Dept   07/07/23 Telemedicine lEisa Traylor PA-C [address] recent visits within past 7 days and meeting all other requirements  Future Appointments  No visits were found meeting these conditions. Showing future appointments within next 150 days and meeting all other requirements       The patient was identified by name and date of birth. Rodríguez Aj was informed that this is a telemedicine visit and that the visit is being conducted [employer]. She agrees to proceed. .  My office door was closed. No one else was in the room. She acknowledged consent and understanding of privacy and security of the video platform. The patient has agreed to participate and understands they can discontinue the visit at any time. Patient is aware this is a billable service. Progress Note - [facility], [place] PHP  Rodríguez Aj 59 y.o. female MRN: [id number]     Progress at partial program: some improvement. Chart reviewed and discussed in treatment team.  Jess Cordero seen by Christy 7/7 at which time cymbalta started and zoloft cnt at 150 mg/d. On her own Alexa decreased zoloft to 100 mg couple of days ago. Notes no withdrawal as of yet. Encouraged Alexa to please work with prescriber with med adjustments. Alexa reports only side effect is dry mouth. Feels anxiety better managed with coping skills, although symptoms remain quite high with am panic. Nausea and diarrhea have decreased.   Is having some back pain which she says typically occurs when she is anxious. Depression is down to 8/78 and on certain days still has thoughts that she would be better off dead- these typically occur on days when her anxiety is high. ROS:   As above otherwise negative    Active Ambulatory Problems     Diagnosis Date Noted   • JACOBY (generalized anxiety disorder)    • SVT (supraventricular tachycardia) ([facility])    • Smoking    • MDD (major depressive disorder), recurrent severe, without psychosis ([address]) 06/30/2023   • PROVISIONAL Body dysmorphic disorder 06/30/2023   • Panic disorder without agoraphobia 06/30/2023   • Social anxiety disorder 06/30/2023     Resolved Ambulatory Problems     Diagnosis Date Noted   • Marijuana use      Past Medical History:   Diagnosis Date   • Anxiety      Family History   Problem Relation Age of Onset   • Depression Mother    • Anxiety disorder Mother    • Alcohol abuse Father    • Depression Brother    • Anxiety disorder Brother      No Known Allergies       Mental Status Evaluation:    Appearance:  age appropriate, adequate grooming   Behavior:  pleasant, cooperative   Speech:  normal rate and volume   Mood:  depressed, anxious   Affect:  mood-congruent   Thought Process:  goal directed   Associations: intact associations   Thought Content:  no overt delusions   Perceptual Disturbances: none   Risk Potential: Suicidal ideation - Yes, passive death wish  Homicidal ideation - None   Sensorium:  oriented to person, place and time/date   Memory:  recent and remote memory grossly intact   Consciousness:  alert and awake   Attention: attention span and concentration are age appropriate   Insight:  intact   Judgment: intact   Gait/Station: unable to assess   Motor Activity: unable to assess today due to virtual visit       Laboratory results: I have personally reviewed all pertinent laboratory/tests results.       Assessment   Diagnoses and all orders for this visit:    JACOBY (generalized anxiety disorder)  -     sertraline (Zoloft) 100 mg tablet; Take 1 tablet (100 mg total) by mouth daily  -     DULoxetine (Cymbalta) 30 mg delayed release capsule; Take 2 capsules (60 mg total) by mouth daily    MDD (major depressive disorder), recurrent severe, without psychosis ([address])  -     sertraline (Zoloft) 100 mg tablet; Take 1 tablet (100 mg total) by mouth daily  -     DULoxetine (Cymbalta) 30 mg delayed release capsule; Take 2 capsules (60 mg total) by mouth daily    Panic disorder without agoraphobia  -     sertraline (Zoloft) 100 mg tablet; Take 1 tablet (100 mg total) by mouth daily  -     DULoxetine (Cymbalta) 30 mg delayed release capsule; Take 2 capsules (60 mg total) by mouth daily    PROVISIONAL Body dysmorphic disorder  -     sertraline (Zoloft) 100 mg tablet; Take 1 tablet (100 mg total) by mouth daily    Social anxiety disorder  -     sertraline (Zoloft) 100 mg tablet; Take 1 tablet (100 mg total) by mouth daily       Current Outpatient Medications   Medication Sig Dispense Refill   • DULoxetine (Cymbalta) 30 mg delayed release capsule Take 2 capsules (60 mg total) by mouth daily     • sertraline (Zoloft) 100 mg tablet Take 1 tablet (100 mg total) by mouth daily     • clonazePAM (KlonoPIN) 1 mg tablet Take 1 mg by mouth 3 (three) times a day     • QUEtiapine Fumarate (SEROQUEL PO) Take 50 mg by mouth 2 (two) times a day Was prescribed 100mg up to QID she reports, but dose not tolerated       No current facility-administered medications for this visit. Plan    Planned medication and treatment changes:   Alexa decreased zoloft faster than this prescriber is comfortable with but insists she is doing ok, so will leave at 100 mg/d for now. Will increase cymbalta to 60 mg/d for depression and anxiety. Feels she is benefiting from program.  Cont seroquel and klonopin per OP psychiatrist.     All current active medications have been reviewed.   Continue treatment with group therapy and partial program      Risks / Benefits of Treatment:    Risks, benefits, and possible side effects of medications explained to patient and patient verbalizes understanding and agrees to medications. Counseling / Coordination of Care:    Patient's progress discussed with staff in treatment team meeting. Medications, treatment progress and treatment plan reviewed with patient.     Elicia Pozo PA-C 07/13/23

## 2023-07-13 NOTE — PATIENT INSTRUCTIONS
You are doing well after your ablation!   You may exercise - no limits to activity   Follow up with Dr. Kira Amanda in 6 months, then likely yearly thereafter  Please call Dr. Cris Hardin office with any palpitations or other recurrent symptoms

## 2023-07-14 ENCOUNTER — OFFICE VISIT (OUTPATIENT)
Dept: PSYCHOLOGY | Facility: CLINIC | Age: 63
End: 2023-07-14
Payer: COMMERCIAL

## 2023-07-14 DIAGNOSIS — F40.10 SOCIAL ANXIETY DISORDER: ICD-10-CM

## 2023-07-14 DIAGNOSIS — F33.2 MDD (MAJOR DEPRESSIVE DISORDER), RECURRENT SEVERE, WITHOUT PSYCHOSIS (HCC): Primary | Chronic | ICD-10-CM

## 2023-07-14 DIAGNOSIS — F41.1 GAD (GENERALIZED ANXIETY DISORDER): ICD-10-CM

## 2023-07-14 DIAGNOSIS — F45.22 BODY DYSMORPHIC DISORDER: ICD-10-CM

## 2023-07-14 DIAGNOSIS — F41.0 PANIC DISORDER WITHOUT AGORAPHOBIA: ICD-10-CM

## 2023-07-14 PROCEDURE — G0410 GRP PSYCH PARTIAL HOSP 45-50: HCPCS

## 2023-07-14 PROCEDURE — G0177 OPPS/PHP; TRAIN & EDUC SERV: HCPCS

## 2023-07-14 PROCEDURE — 90832 PSYTX W PT 30 MINUTES: CPT

## 2023-07-14 PROCEDURE — G0176 OPPS/PHP;ACTIVITY THERAPY: HCPCS

## 2023-07-14 NOTE — PSYCH
Visit Time    Visit Start Time: 6839  Visit Stop Time: 9518  Total Visit Duration: 16 minutes    Subjective:     Patient ID: Jina Malone is a 59 y.o. female. [facility] Clinical Progress Notes      Specialized Services Documentation  Therapist must complete separate progress note for each specific clinical activity in which the individual participated during the day. Other Utilization Review: 0930 Spoke with Rosalinda Mckinley at [facility] phone [phone],  last covered day extended due to missed day. Review due 7/17/2023. Magdaleno Koenig [id number]. LUKE Tenorio     Case Management Note    Ezra BUTLER    Current suicide risk : Low     This case management session was facilitated virtually in a private office using HIPAA Compliant and Approved [employer] Teams. Jina Malone consented to the use of tele-video modality of treatment. Met with Jina Malone to discuss weekend plans and supports. Jina Malone identified a need to continue to work on cognitive distortions. She reported feeling "very depressed" yesterday on her day off however did not know why. She reports she goes from anxiety to depression and back - this writer challenged her to voice other emotions she experiences as well. She presented as more stuck today. Goals for next week include reduce to IOP and monitor medication changes made yesterday. Medications changes/added/denied? No    Treatment session number: 9    Individual Case Management Visit provided today?  Yes     [facility] follow up physician's orders: na

## 2023-07-14 NOTE — PSYCH
Virtual Regular Visit    Verification of patient location:    Patient is located at Home in the following state in which I hold an active license [place]      Assessment/Plan:    Problem List Items Addressed This Visit        Other    MDD (major depressive disorder), recurrent severe, without psychosis ([address]) - Primary (Chronic)    JACOBY (generalized anxiety disorder)    PROVISIONAL Body dysmorphic disorder    Panic disorder without agoraphobia    Social anxiety disorder       Goals addressed in session:           Reason for visit is PHP VIRTUAL GROUP DUE TO virtual preference of care      Encounter provider [facility], [address] [place] [address]    Provider located at [address] [address] [address]      Recent Visits  Date Type Provider Dept   07/12/23 Office Visit [facility] PARTIAL PSYCH GROUP THERAPY [facility]   07/11/23 Office Visit [facility] PARTIAL PSYCH GROUP THERAPY [facility]   07/10/23 Office Visit [facility] PARTIAL [facility]   07/07/23 Office Visit [facility] PARTIAL PSYCH GROUP THERAPY [facility]   Showing recent visits within past 7 days and meeting all other requirements  Future Appointments  No visits were found meeting these conditions. Showing future appointments within next 150 days and meeting all other requirements       The patient was identified by name and date of birth. Katheryn Trinh was informed that this is a telemedicine visit and that the visit is being conducted [employer]. She agrees to proceed. .  My office door was closed. No one else was in the room. She acknowledged consent and understanding of privacy and security of the video platform. The patient has agreed to participate and understands they can discontinue the visit at any time. Patient is aware this is a billable service. Subjective  Katheryn Trinh is a 59 y.o. female  . HPI     Past Medical History:   Diagnosis Date   • Anxiety    • SVT (supraventricular tachycardia) ([address])        Past Surgical History:   Procedure Laterality Date   • CARDIAC ELECTROPHYSIOLOGY PROCEDURE N/A 05/30/2023    Procedure: Cardiac eps/svt ablation;  Surgeon: Maximiliano Blankenship MD;  [facility]; Service: Cardiology   • JOINT REPLACEMENT  2010    right hip       Current Outpatient Medications   Medication Sig Dispense Refill   • clonazePAM (KlonoPIN) 1 mg tablet Take 1 mg by mouth 3 (three) times a day     • DULoxetine (Cymbalta) 30 mg delayed release capsule Take 2 capsules (60 mg total) by mouth daily     • QUEtiapine Fumarate (SEROQUEL PO) Take 50 mg by mouth 2 (two) times a day Was prescribed 100mg up to QID she reports, but dose not tolerated     • sertraline (Zoloft) 100 mg tablet Take 1 tablet (100 mg total) by mouth daily       No current facility-administered medications for this visit. No Known Allergies    Review of Systems    Video Exam    There were no vitals filed for this visit.     Physical Exam     I spent FOUR GROUP HOURS PLUS CASE MANAGEMENT minutes with patient today in which greater than 50% of time was spent in counseling/coordination of care regarding PHP - SEE NOTES

## 2023-07-14 NOTE — PSYCH
Visit Time    Visit Start Time: 0930  Visit Stop Time: 1024  Total Visit Duration: 60 minutes    Subjective:     Patient ID: Tawanda Contreras is a 59 y.o. y.o. female. [facility] Clinical Progress Notes      Specialized Services Documentation  Therapist must complete separate progress note for each specific clinical activity in which the individual participated during the day. This group was facilitated virtually in a private office using HIPAA Compliant and Approved Microsoft Teams. Tawanda Contreras consented to the use of tele-video modality of treatment. Allied Therapy  Tawanda Contreras actively shared in [facility] group focused on coping with worry. Alexa was encouraged to distinguish healthy versus unhealthy worries. She engaged in object meditation. Concepts including worry time, risk assessment, and thought mapping explored. Alexa easily shared her fears of the future related to health and her spouse. She was receptive. Some progress voiced toward goal.  Continue psychotherapy to engage in the development and practice of wellness tools.     Tx Plan Objective: 1.1 Therapist: LUKE Grider

## 2023-07-14 NOTE — PSYCH
Subjective:     Patient ID: Kristina Davila is a 59 y.o. female. [facility] Clinical Progress Notes      Specialized Services Documentation  Therapist must complete separate progress note for each specific clinical activity in which the individual participated during the day. GROUP PSYCHOTHERAPY- Discharge Prep   ([id number] ) Group was facilitated virtually in a private office using HIPAA Compliant and Approved Microsoft Teams. Kristina Davila consented to the use of tele-video modality of treatment and was virtually present for group psychotherapy today. The group engaged in discussion about discharge prep and life after discharge for them. We discussed making outpatient provider appointments, importance of keeping appointments, support groups, warm/peer line, crisis lines, relapse prevention plan, what to expect day of discharge. Then, group discussed the challenges and benefits about being discharged. Members were given the opportunity to process their emotions regarding no longer being in the partial program. Group members were then asked the following questions:    1. What are two ways you can start planning towards life after the partial program?   2. What is scary about discharge? 3. What is something you can look forward to? Kristina Davila seemed attentive throughout the group session. Kristina Davila encouraged to make progress towards goals and objectives through group participation and will continue to attend psychotherapy group.       Tx plan objective: 1.1, 1.2   Therapist: Ed CRUZ RN

## 2023-07-14 NOTE — PSYCH
Visit Time    Visit Start Time: 4408  Visit Stop Time: 1195  Total Visit Duration: 60 minutes     Subjective:     Patient ID: Rodríguez Aj is a 59 y.o. female. [facility] Clinical Progress Notes      Specialized Services Documentation  Therapist must complete separate progress note for each specific clinical activity in which the individual participated during the day. Group Psychotherapy - Wellness Assessment  This group was facilitated virtually in a private office using HIPAA compliant and approved Microsoft teams. Rodríguez Aj consented to the use of tele-video modality of treatment. Rodríguez Aj attended group on the Wellness Assessment. The group engaged in the wellness assessment, which evaluates progress on several different areas of wellness/wellbeing: physical, emotional, cognitive, vocational, social and spiritual. Clients rated their progress and discussed areas that need work. By completing and discussing areas of progress, goals and challenges, members are connected and reminded that, in their mental health struggle, they are not alone. Topics of discussion revolved around setting goals, coping with change, methods to achieve goals. Rodríguez Aj continues to make progress towards goals through participation in group activity and personal disclosures. Jony Mccartney shared she wants to improve the emotion domain of her wellness. Tx Plan Objective:1.1,1.2,1.4  Therapist: JOLLY [facility]      Education Therapy   [id number] Rodríguez Aj actively shared in morning assessment and goal review. Presented as Receptive related to readiness to learn. Rodríguez Aj did complete goal from last treatment day identifying a gain of responsibility and support. did not present with any barriers to learning. [phone] Rodríguez Aj engaged throughout the treatment day. Was engaged in learning related to Illness, Medication, Aftercare, and Wellness Tools.  Staff utilized Verbal, Written, A/V, and Demonstration teaching methods. Loreta Ren shared area of learning and set a goal for outside of program to manage her anxiety.       Tx Plan Objective: 1.1,1.2,1.4, Therapist: Anabel Billings, [facility] [facility]

## 2023-07-17 ENCOUNTER — OFFICE VISIT (OUTPATIENT)
Dept: PSYCHOLOGY | Facility: CLINIC | Age: 63
End: 2023-07-17
Payer: COMMERCIAL

## 2023-07-17 DIAGNOSIS — F41.0 PANIC DISORDER WITHOUT AGORAPHOBIA: ICD-10-CM

## 2023-07-17 DIAGNOSIS — F40.10 SOCIAL ANXIETY DISORDER: ICD-10-CM

## 2023-07-17 DIAGNOSIS — F41.1 GAD (GENERALIZED ANXIETY DISORDER): ICD-10-CM

## 2023-07-17 DIAGNOSIS — F45.22 BODY DYSMORPHIC DISORDER: ICD-10-CM

## 2023-07-17 DIAGNOSIS — F33.2 MDD (MAJOR DEPRESSIVE DISORDER), RECURRENT SEVERE, WITHOUT PSYCHOSIS (HCC): Primary | Chronic | ICD-10-CM

## 2023-07-17 PROCEDURE — G0176 OPPS/PHP;ACTIVITY THERAPY: HCPCS

## 2023-07-17 PROCEDURE — G0177 OPPS/PHP; TRAIN & EDUC SERV: HCPCS

## 2023-07-17 PROCEDURE — 90832 PSYTX W PT 30 MINUTES: CPT

## 2023-07-17 PROCEDURE — G0410 GRP PSYCH PARTIAL HOSP 45-50: HCPCS

## 2023-07-17 NOTE — PSYCH
Visit Time    Visit Start Time: 6164  Visit Stop Time: 1330  Total Visit Duration: 60 minutes    Subjective:     Patient ID: Darshana Longoria is a 59 y.o. y.o. female. [facility] Clinical Progress Notes      Specialized Services Documentation  Therapist must complete separate progress note for each specific clinical activity in which the individual participated during the day. This group was facilitated virtually in a private office using HIPAA Compliant and Approved Microsoft Teams. Darshana Longoria consented to the use of tele-video modality of treatment. Allied Therapy  Darshana Longoria actively shared in music therapy group focused on self- care. Alexa was encouraged to identify aspects of self-care and barriers to it. The concept of self -care versus selfishness explored. Group reinforced the necessity of self -care in wellness versus seeing this as a luxury and tips to increase self-care - a stretching exercise introduced and practiced. DBT skill FAST reviewed. When asked at end, a specific thing she could commit to in order to increase self-care, Alexa stated they could exercise. Some progress voiced toward goal.  Continue MTH to engage in the development and practice of wellness tools.     Tx Plan Objective: 1.1, Therapist: Mireille Jarvis MT-BC

## 2023-07-17 NOTE — PSYCH
Visit Time    Visit Start Time: 3965  Visit Stop Time: 6551  Total Visit Duration: 16 minutes    Subjective:     Patient ID: Yvette Rivera is a 59 y.o. female. [facility] Clinical Progress Notes      Specialized Services Documentation  Therapist must complete separate progress note for each specific clinical activity in which the individual participated during the day. Case Management Note    Margaux BUTLER    Current suicide risk : Low      This case management session was facilitated virtually in a private office using HIPAA Compliant and Approved Microsoft Teams. Yvette Rivera consented to the use of tele-video modality of treatment. Utilization Review: Left message for Coleen Hester from [place] [facility] with discharge clinical - Dion Boast will start IOP on 7/20/2023 - IOP does not require pre-cert per Coleen Hester. LUKE Herman      Met with Yvette Rivera. Reported continued dips in her mood. She is feeling "good today" but was "extremely depressed yesterday" yet went to work and felt better after she did. She has difficulty identifying triggers and ways to cope other than distracting self with work. With discussion, she did identify the significance of the loss of routine after the horse passed away. She did become tearful. Discussed need to create new routine. Reviewed IOP step down. Medications changes/added/denied? No    Treatment session number: 10    Individual Case Management Visit provided today?  Yes     [facility] follow up physician's orders: na

## 2023-07-17 NOTE — PSYCH
Visit Time    Visit Start Time: 0930  Visit Stop Time: 0730  Total Visit Duration: 60 minutes    Subjective:     Patient ID: Bev Mak is a 59 y.o. female. [facility] Clinical Progress Notes      Specialized Services Documentation  Therapist must complete separate progress note for each specific clinical activity in which the individual participated during the day. Group Psychotherapy - DBT House  This group was facilitated virtually in a private office using HIPAA compliant and approved [employer] teams. Bev Mak consented to the use of tele-video modality of treatment. Bev Mak attended group on [address] [facility]. Group members followed the DBT house template in order to self-explore and recognize various domains of their wellness. Some of these domains include coping skills, strengths, values, supports, emotions, and behaviors. This writer led a group discussion on their feelings and reflections stemming from the completion of the DBT house exercise. This writer encouraged the group members to partake in group discussion and utilize supplemental materials inside and outside of the program.  Bev Mak made good efforts towards progress goals which were displayed through note taking, participation in discussion, and engagement in topic. Alexa reflected on her supports and how she continues to build them. Tx Plan Objective: 1.1,1.2,1.4 Therapist: [address]    Education Therapy   [id number] Bev Mak actively shared in morning assessment and goal review. Presented as Receptive related to readiness to learn. Bev Mak did complete goal from last treatment day identifying a gain of hope. did not present with any barriers to learning. [phone] Bev Mak engaged throughout the treatment day. Was engaged in learning related to Illness, Medication, Aftercare, and Wellness Tools.  Staff utilized Verbal, Written, A/V, and Demonstration teaching methods. Terrell Patel shared area of learning and set a goal for outside of program to go food shopping.       Tx Plan Objective: 1.1,1.2,1.4, Therapist: JOLLY Francis [facility]

## 2023-07-17 NOTE — PSYCH
Subjective:     Patient ID: Rodríguez Aj is a 59 y.o. female. [facility] Clinical Progress Notes      Specialized Services Documentation  Therapist must complete separate progress note for each specific clinical activity in which the individual participated during the day. Group Psychotherapy Life Skills ([id number]) Rodríguez Aj actively engaged in group focused on coping skills which was facilitated virtually in a private office using HIPAA Compliant and Approved [employer] Teams. Jony Mccartney consented to the use of tele-video modality of treatment and was virtually present for group psychotherapy today. During group we created a list of coping skills from A-Z which group members can utilize when they are feeling stressed. Participants generated a list on the shared word document of coping strategies and were given a copy of the A-Z coping strategies list that they created. Jony Mccartney identified that a coping skill they would like to use is exercise and do meditation. Jony Mccartney continues to make progress towards goals through verbal participation in group; to accomplish long term goals continue to utilize skills learned in programming. Continue with psychotherapy to educate and encourage use of wellness tools.  Tx Plan Objective: 1.1,1.2 Therapist: Danny Lacy

## 2023-07-17 NOTE — PSYCH
Virtual Regular Visit    Verification of patient location:    Patient is located at Home in the following state in which I hold an active license [place]      Assessment/Plan:    Problem List Items Addressed This Visit        Other    MDD (major depressive disorder), recurrent severe, without psychosis ([address]) - Primary (Chronic)    JACOBY (generalized anxiety disorder)    PROVISIONAL Body dysmorphic disorder    Panic disorder without agoraphobia    Social anxiety disorder       Goals addressed in session:           Reason for visit is PHP VIRTUAL GROUP DUE TO virtual preference of care      Encounter provider [facility], [address] [place] [address]    Provider located at [address] [address] [address]      Recent Visits  Date Type Provider Dept   07/14/23 Office Visit [facility] PARTIAL PSYCH GROUP THERAPY [facility]   07/12/23 Office Visit [facility] PARTIAL PSYCH GROUP THERAPY [facility]   07/11/23 Office Visit [facility] PARTIAL [facility]   07/10/23 Office Visit [facility] PARTIAL PSYCH GROUP THERAPY [facility]   Showing recent visits within past 7 days and meeting all other requirements  Future Appointments  No visits were found meeting these conditions. Showing future appointments within next 150 days and meeting all other requirements       The patient was identified by name and date of birth. Clide Bosworth was informed that this is a telemedicine visit and that the visit is being conducted [employer]. She agrees to proceed. .  My office door was closed. No one else was in the room. She acknowledged consent and understanding of privacy and security of the video platform. The patient has agreed to participate and understands they can discontinue the visit at any time. Patient is aware this is a billable service. Subjective  Clide Bosworth is a 59 y.o. female  . HPI     Past Medical History:   Diagnosis Date   • Anxiety    • SVT (supraventricular tachycardia) ([address])        Past Surgical History:   Procedure Laterality Date   • CARDIAC ELECTROPHYSIOLOGY PROCEDURE N/A 05/30/2023    Procedure: Cardiac eps/svt ablation;  Surgeon: Maximiliano Blankenship MD;  Location: [facility]; Service: Cardiology   • JOINT REPLACEMENT  2010    right hip       Current Outpatient Medications   Medication Sig Dispense Refill   • clonazePAM (KlonoPIN) 1 mg tablet Take 1 mg by mouth 3 (three) times a day     • DULoxetine (Cymbalta) 30 mg delayed release capsule Take 2 capsules (60 mg total) by mouth daily     • QUEtiapine Fumarate (SEROQUEL PO) Take 50 mg by mouth 2 (two) times a day Was prescribed 100mg up to QID she reports, but dose not tolerated     • sertraline (Zoloft) 100 mg tablet Take 1 tablet (100 mg total) by mouth daily       No current facility-administered medications for this visit. No Known Allergies    Review of Systems    Video Exam    There were no vitals filed for this visit.     Physical Exam     I spent FOUR GROUP HOURS PLUS CASE MANAGEMENT minutes with patient today in which greater than 50% of time was spent in counseling/coordination of care regarding PHP - SEE NOTES

## 2023-07-18 ENCOUNTER — DOCUMENTATION (OUTPATIENT)
Dept: PSYCHOLOGY | Facility: CLINIC | Age: 63
End: 2023-07-18

## 2023-07-18 ENCOUNTER — APPOINTMENT (OUTPATIENT)
Dept: PSYCHOLOGY | Facility: CLINIC | Age: 63
End: 2023-07-18
Payer: COMMERCIAL

## 2023-07-19 ENCOUNTER — DOCUMENTATION (OUTPATIENT)
Dept: PSYCHOLOGY | Facility: CLINIC | Age: 63
End: 2023-07-19

## 2023-07-19 ENCOUNTER — APPOINTMENT (OUTPATIENT)
Dept: PSYCHOLOGY | Facility: CLINIC | Age: 63
End: 2023-07-19
Payer: COMMERCIAL

## 2023-07-19 ENCOUNTER — TELEMEDICINE (OUTPATIENT)
Dept: PSYCHIATRY | Facility: CLINIC | Age: 63
End: 2023-07-19
Payer: COMMERCIAL

## 2023-07-19 DIAGNOSIS — F41.0 PANIC DISORDER WITHOUT AGORAPHOBIA: ICD-10-CM

## 2023-07-19 DIAGNOSIS — F41.1 GAD (GENERALIZED ANXIETY DISORDER): ICD-10-CM

## 2023-07-19 DIAGNOSIS — F33.2 MDD (MAJOR DEPRESSIVE DISORDER), RECURRENT SEVERE, WITHOUT PSYCHOSIS (HCC): ICD-10-CM

## 2023-07-19 PROCEDURE — 99213 OFFICE O/P EST LOW 20 MIN: CPT | Performed by: NURSE PRACTITIONER

## 2023-07-19 RX ORDER — DULOXETIN HYDROCHLORIDE 60 MG/1
60 CAPSULE, DELAYED RELEASE ORAL DAILY
Qty: 14 CAPSULE | Refills: 0 | Status: SHIPPED | OUTPATIENT
Start: 2023-07-19 | End: 2023-07-28 | Stop reason: SDUPTHER

## 2023-07-19 NOTE — PROGRESS NOTES
Subjective:     Patient ID: Rosa Lizarraga is a 59 y.o. female. [facility] Clinical Progress Notes      Specialized Services Documentation  Therapist must complete separate progress note for each specific clinical activity in which the individual participated during the day. Case Management Note    Ady Acevedo [facility]    Current suicide risk : Low     Rosa Lizarraga excused due to IOP status. Medications changes/added/denied? Yes     Treatment session number: na    Individual Case Management Visit provided today?  No    [facility] follow up physician's orders: see WILL Rothman note

## 2023-07-19 NOTE — PSYCH
Progress Note - Behavioral Health   [facility]  Amado Fabiola Davila 59 y.o. female MRN: [id number]         Verification of patient location:    Patient is located in the following state in which I hold an active license [place]         Encounter provider Olesya Flores, [address] [facility]    Provider located at    [address] [facility]  [address] [facility] [address] [phone]    Recent Visits  Date Type Provider Dept   07/13/23 Telemedicine Ilene Hernandez PA-C [address] [place] [address] recent visits within past 7 days and meeting all other requirements  Today's Visits  Date Type Provider Dept   07/19/23 Telemedicine Olesya Flores, [address] [facility] today's visits and meeting all other requirements  Future Appointments  No visits were found meeting these conditions. Showing future appointments within next 150 days and meeting all other requirements           The patient was identified by name and date of birth. Tez Steele was informed that this is a telemedicine visit and that the visit is being conducted [employer]. She agrees to proceed. .  My office door was closed. No one else was in the room. She acknowledged consent and understanding of privacy and security of the video platform. The patient has agreed to participate and understands they can discontinue the visit at any time. Patient is aware this is a billable service.      HPI     Current Outpatient Medications   Medication Sig Dispense Refill   • DULoxetine (Cymbalta) 60 mg delayed release capsule Take 1 capsule (60 mg total) by mouth daily 14 capsule 0   • clonazePAM (KlonoPIN) 1 mg tablet Take 1 mg by mouth 3 (three) times a day     • QUEtiapine Fumarate (SEROQUEL PO) Take 50 mg by mouth 2 (two) times a day Was prescribed 100mg up to QID she reports, but dose not tolerated     • sertraline (Zoloft) 100 mg tablet Take 75 mg by mouth daily       No current facility-administered medications for this visit. Review of Systems    Video Exam    There were no vitals filed for this visit. Physical Exam   As a result of this visit, I have referred the patient for further respiratory evaluation. No    I spent 10 minutes directly with the patient during this visit  VIRTUAL VISIT DISCLAIMER    Rodríguez Aj acknowledges that she has consented to an online visit or consultation. She understands that the online visit is based solely on information provided by her, and that, in the absence of a face-to-face physical evaluation by the physician, the diagnosis she receives is both limited and provisional in terms of accuracy and completeness. This is not intended to replace a full medical face-to-face evaluation by the physician. Rodríguez Aj understands and accepts these terms. Visit Time    Visit Start Time: 0830   Visit Stop Time: 0840  Total Visit Duration: 10 minutes  Progress at partial program: improving. Jony Mccartney last seen by Elisa NARANJO on 7/13/23. At that time, her Zoloft was dec to 100mg PO daily and her Cymbalta was inc to 60mg PO Daily. Jony Mccartney reports today that she has noticed longer periods of where she has no anxiety or depression throughout the day. She states that she still has ups and downs but they are improving and is happy with the medication changes so far. States she has never felt this good on any other medications she has been on, and "she has been on a lot of medications". She has decreased SI and denies any plans or intent. She still has thoughts, but they are more easily removed. She denies [place]. She denies HI. She very much enjoys her job as a  at [employer] where she is able to interact with others and states she works there 4-5 days/week. She reports great sleep, good appetite, but continues to have some nausea at times.  She states when her anxiety is "not in my head, it's in my stomach". At this time we will continue to taper the zoloft to 75mg PO daily and we will consider increasing cymbalta to 90mg daily at next appointment.        Current Medication:     Current Outpatient Medications:   •  DULoxetine (Cymbalta) 60 mg delayed release capsule, Take 1 capsule (60 mg total) by mouth daily, Disp: 14 capsule, Rfl: 0  •  clonazePAM (KlonoPIN) 1 mg tablet, Take 1 mg by mouth 3 (three) times a day, Disp: , Rfl:   •  QUEtiapine Fumarate (SEROQUEL PO), Take 50 mg by mouth 2 (two) times a day Was prescribed 100mg up to QID she reports, but dose not tolerated, Disp: , Rfl:   •  sertraline (Zoloft) 100 mg tablet, Take 75 mg by mouth daily, Disp: , Rfl:      Medication side effects: denies    ROS:   As above otherwise negative    Active Ambulatory Problems     Diagnosis Date Noted   • JACOBY (generalized anxiety disorder)    • SVT (supraventricular tachycardia) ([facility])    • Smoking    • MDD (major depressive disorder), recurrent severe, without psychosis ([address]) 06/30/2023   • PROVISIONAL Body dysmorphic disorder 06/30/2023   • Panic disorder without agoraphobia 06/30/2023   • Social anxiety disorder 06/30/2023     Resolved Ambulatory Problems     Diagnosis Date Noted   • Marijuana use      Past Medical History:   Diagnosis Date   • Anxiety      Family History   Problem Relation Age of Onset   • Depression Mother    • Anxiety disorder Mother    • Alcohol abuse Father    • Depression Brother    • Anxiety disorder Brother      Social History     Socioeconomic History   • Marital status: /Civil Union     Spouse name: Not on file   • Number of children: Not on file   • Years of education: Not on file   • Highest education level: Not on file   Occupational History   • Not on file   Tobacco Use   • Smoking status: Every Day     Packs/day: 1.00     Types: Cigarettes   • Smokeless tobacco: Never   Substance and Sexual Activity   • Alcohol use: Not Currently   • Drug use: Not Currently Comment: medical marijuana   • Sexual activity: Not Currently     Partners: Male   Other Topics Concern   • Not on file   Social History Narrative   • Not on file     Social Determinants of Health     Financial Resource Strain: Not on file   Food Insecurity: Not on file   Transportation Needs: Not on file   Physical Activity: Not on file   Stress: Not on file   Social Connections: Not on file   Intimate Partner Violence: Not on file   Housing Stability: Not on file     No Known Allergies       Mental Status Evaluation:    Appearance:  age appropriate, casually dressed   Behavior:  pleasant, cooperative   Speech:  normal rate, normal volume, normal pitch   Mood:  "still has ups and downs"   Affect:  normal range and intensity   Thought Process:  goal directed, linear   Associations: intact associations   Thought Content:  normal, no overt delusions   Perceptual Disturbances: none   Risk Potential: Suicidal ideation - Yes, fleeting suicidal thoughts, without plans or intent  Homicidal ideation - None   Sensorium:  oriented to person, place and time/date   Memory:  recent and remote memory grossly intact   Consciousness:  alert and awake   Attention: attention span and concentration are age appropriate   Insight:  fair   Judgment: fair   Gait/Station: normal gait/station   Motor Activity: no abnormal movements       Laboratory results: I have personally reviewed all pertinent laboratory/tests results. Assessment   Diagnoses and all orders for this visit:    JACOBY (generalized anxiety disorder)  -     DULoxetine (Cymbalta) 60 mg delayed release capsule; Take 1 capsule (60 mg total) by mouth daily    MDD (major depressive disorder), recurrent severe, without psychosis ([facility])  -     DULoxetine (Cymbalta) 60 mg delayed release capsule; Take 1 capsule (60 mg total) by mouth daily    Panic disorder without agoraphobia  -     DULoxetine (Cymbalta) 60 mg delayed release capsule;  Take 1 capsule (60 mg total) by mouth daily       Planned medication and treatment changes: All current active medications have been reviewed. She feels she is benefiting from program.  Will continue the seroquel and klonopin per outpatient psychiatrist.  At this time we will continue to taper the zoloft to 75mg PO daily and we can consider increasing cymbalta to 90mg daily at next appointment. Risks / Benefits of Treatment:    Risks, benefits, and possible side effects of medications explained to patient and patient verbalizes understanding and agrees to medications. Counseling / Coordination of Care:    Patient's progress discussed with staff in treatment team meeting. Medications, treatment progress and treatment plan reviewed with patient.     Enrico WILL Evans 07/19/23

## 2023-07-20 ENCOUNTER — OFFICE VISIT (OUTPATIENT)
Dept: PSYCHOLOGY | Facility: CLINIC | Age: 63
End: 2023-07-20
Payer: COMMERCIAL

## 2023-07-20 DIAGNOSIS — F33.2 MDD (MAJOR DEPRESSIVE DISORDER), RECURRENT SEVERE, WITHOUT PSYCHOSIS (HCC): Primary | ICD-10-CM

## 2023-07-20 DIAGNOSIS — F41.1 GAD (GENERALIZED ANXIETY DISORDER): ICD-10-CM

## 2023-07-20 PROCEDURE — S9480 INTENSIVE OUTPATIENT PSYCHIA: HCPCS

## 2023-07-20 NOTE — PSYCH
Subjective:     Patient ID: Johnnie Tovar is a 59 y.o. female. [facility] Clinical Progress Notes      Specialized Services Documentation  Therapist must complete separate progress note for each specific clinical activity in which the individual participated during the day. Group Psychotherapy Life Skills ([id number]) - Johnnie Tovar actively engaged in group focused on gratitude which was facilitated virtually in a private office using HIPAA Compliant and Approved [employer] Teams. Yenifer Sheffield consented to the use of tele-video modality of treatment and was virtually present for group psychotherapy today. Group members discussed why it is important to think about what we are grateful for. Group members worked on a gratefulness worksheet and shared their responses in group. Clients also learned about the G.L.A.D. technique for practice gratefulness. Yenifer Sheffield stated that she is grateful for her  and brother. Group watched a short video about the 365 grateful project with speaker Krishan Miranda. We discussed the benefits of thinking, reflecting and talking about what they are grateful for. Yenifer Sheffield continues to make progress towards goals through verbal participation in group; to accomplish long term goals continue to utilize skills learned in programming. Continue with psychotherapy to educate and encourage use of wellness tools.  Tx Plan Objective: 1.1,1.2 Therapist: Laura Smith

## 2023-07-20 NOTE — PSYCH
Visit Time    Visit Start Time: 7157  Visit Stop Time: 6433  Total Visit Duration: 60 minutes    Subjective:     Patient ID: Loreta Mcclure is a 59 y.o. female. [facility] Clinical Progress Notes      Specialized Services Documentation  Therapist must complete separate progress note for each specific clinical activity in which the individual participated during the day. Group Therapy - Hope and SMART Goals    This group was facilitated virtually in a private office using HIPAA compliant and approved [employer] teams. Loreta Mcclure consented to the use of tele-video modality of treatment. Loreta Mcclure attended group on hope and goal setting. This group examined the relationship of hope and setting smart goals. This writer facilitated a discussion on perceptions of hope and ways to cultivate new hope. Members were encouraged to share their perspective on hope. This writer then detailed how hope and setting smart goals are interconnected and when we continue to set smart goals we can cultivate hope. Members completed this group by setting their own SMART goals. Loreta Mcclure made good efforts towards progress goals which were displayed through note taking, engagement in topic, and participation in the discussion. At one point while pily was sharing she began victimizing her self and repeating the phrase "why me." She also shared her anger is often her motivating force. Tx Plan Objective: 1.1,1.2,1.4  Therapist: JOLLY Kim [facility]     Education Therapy     [id number] Loreta Mcclure actively shared in morning assessment and goal review. Presented as Receptive related to readiness to learn. Loreta Mcclure did complete goal from last treatment day identifying a gain of advocacy. did not present with any barriers to learning. [id number] Loreta Mcclure engaged throughout the treatment day.  Was engaged in learning related to Illness, Medication, Aftercare, and Wellness Tools. Staff utilized Verbal, Written, A/V, and Demonstration teaching methods. Erna Garcia shared area of learning and set a goal for outside of program to relax.       Tx Plan Objective: 1.1,1.2,1.4, Therapist: JOLLY Alexandra [facility]

## 2023-07-20 NOTE — PSYCH
Virtual Regular Visit    Verification of patient location:    Patient is located at Home in the following state in which I hold an active license [place]      Assessment/Plan:    Problem List Items Addressed This Visit        Other    MDD (major depressive disorder), recurrent severe, without psychosis ([address]) - Primary (Chronic)    JACOBY (generalized anxiety disorder)       Goals addressed in session: Goal 1          Reason for visit is VIRTUAL CARE DUE TO PREFERRED VIRTUAL CARE. Encounter provider [address] [facility] PARTIAL PSYCH PROGRAM    Provider located at [address] [address] [address]      Recent Visits  Date Type Provider Dept   07/17/23 Office Visit [address] [facility] PARTIAL PSYCH GROUP THERAPY [facility]   07/14/23 Office Visit [address] [facility] PARTIAL PSYCH GROUP THERAPY [facility]   Showing recent visits within past 7 days and meeting all other requirements  Today's Visits  Date Type Provider Dept   07/20/23 Office Visit [address] [facility] PARTIAL PSYCH GROUP THERAPY [facility]   Showing today's visits and meeting all other requirements  Future Appointments  No visits were found meeting these conditions. Showing future appointments within next 150 days and meeting all other requirements       The patient was identified by name and date of birth. Bve Mak was informed that this is a telemedicine visit and that the visit is being conducted [employer]. She agrees to proceed. .  My office door was closed. No one else was in the room. She acknowledged consent and understanding of privacy and security of the video platform. The patient has agreed to participate and understands they can discontinue the visit at any time. Patient is aware this is a billable service. Subjective  Bev Mak is a 59 y.o. female  .       HPI     Past Medical History:   Diagnosis Date   • Anxiety    • SVT (supraventricular tachycardia) [facility])        Past Surgical History:   Procedure Laterality Date   • CARDIAC ELECTROPHYSIOLOGY PROCEDURE N/A 05/30/2023    Procedure: Cardiac eps/svt ablation;  Surgeon: Era Hsieh MD;  [facility]; Service: Cardiology   • JOINT REPLACEMENT  2010    right hip       Current Outpatient Medications   Medication Sig Dispense Refill   • clonazePAM (KlonoPIN) 1 mg tablet Take 1 mg by mouth 3 (three) times a day     • DULoxetine (Cymbalta) 60 mg delayed release capsule Take 1 capsule (60 mg total) by mouth daily 14 capsule 0   • QUEtiapine Fumarate (SEROQUEL PO) Take 50 mg by mouth 2 (two) times a day Was prescribed 100mg up to QID she reports, but dose not tolerated     • sertraline (Zoloft) 100 mg tablet Take 75 mg by mouth daily       No current facility-administered medications for this visit. No Known Allergies    Review of Systems    Video Exam    There were no vitals filed for this visit. Physical Exam     Visit Time  I have spent FOUR GROUP HOURS + CASE MANAGEMENT minutes with patient today in which greater than 50% of the time was spent in counseling/coordination of care regarding PHP- see notes.

## 2023-07-20 NOTE — PSYCH
Visit Time  Subjective:     Patient ID: Terrell Patel is a 59 y.o. female. [facility] Clinical Progress Notes      Specialized Services Documentation  Therapist must complete separate progress note for each specific clinical activity in which the individual participated during the day. Case Management Note    LUKE Serrano    Current suicide risk : Low     No case management scheduled or requested. Aware of next 1:1    Medications changes/added/denied? no    Treatment session number: 11 iop 1    Individual Case Management Visit provided today? No    [facility] follow up physician's orders: start IOP ORDER TODAY.

## 2023-07-20 NOTE — PSYCH
Subjective:    Patient ID: Jina Malone is a 59 y.o. female      [facility] Clinical Progress Notes      Specialized Services Documentation  Therapist must complete separate progress note for each specific clinical activity in which the individual participated during the day. Allied Therapy ([id number]) This group was facilitated virtually in a private office using HIPAA Compliant and Approved [employer] Teams. Jina Malone consented to the use of tele-video modality of treatment. Jina Malone attended group focused on identifying where one is in the process of making changes.  explained that an important part of recovery is the process of making changes which can be broken down into five major steps (awareness, determination, action, maintenance, and relapse). Group discussed the value in each stage and how it prepares us for the next. Alexa participated by identifying a change that they are currently working on, what stage of process they are at, and who can assist them to move forward? Alexa shared that she has continuously been working on how she perceives information and felt she has a good action plan. Some progress noted toward treatment plan goals displayed through participation, personal disclosure, and attentiveness. Continue to involve in [employer] groups to increase motivation to persevere through awareness of various benefits of making changes.  Tx Plan Objective: 1.1, 1.2, 1.4, Therapist:  GAUTAM Bal

## 2023-07-21 ENCOUNTER — APPOINTMENT (OUTPATIENT)
Dept: PSYCHOLOGY | Facility: CLINIC | Age: 63
End: 2023-07-21
Payer: COMMERCIAL

## 2023-07-21 PROCEDURE — 90832 PSYTX W PT 30 MINUTES: CPT

## 2023-07-24 ENCOUNTER — OFFICE VISIT (OUTPATIENT)
Dept: PSYCHOLOGY | Facility: CLINIC | Age: 63
End: 2023-07-24
Payer: COMMERCIAL

## 2023-07-24 DIAGNOSIS — F40.10 SOCIAL ANXIETY DISORDER: ICD-10-CM

## 2023-07-24 DIAGNOSIS — F41.1 GAD (GENERALIZED ANXIETY DISORDER): ICD-10-CM

## 2023-07-24 DIAGNOSIS — F41.0 PANIC DISORDER WITHOUT AGORAPHOBIA: ICD-10-CM

## 2023-07-24 DIAGNOSIS — F45.22 BODY DYSMORPHIC DISORDER: ICD-10-CM

## 2023-07-24 DIAGNOSIS — F33.2 MDD (MAJOR DEPRESSIVE DISORDER), RECURRENT SEVERE, WITHOUT PSYCHOSIS (HCC): Primary | Chronic | ICD-10-CM

## 2023-07-24 PROCEDURE — 90832 PSYTX W PT 30 MINUTES: CPT

## 2023-07-24 PROCEDURE — S9480 INTENSIVE OUTPATIENT PSYCHIA: HCPCS

## 2023-07-24 NOTE — PSYCH
Visit Time    Visit Start Time: 0930  Visit Stop Time: 6666  Total Visit Duration: 60 minutes    Subjective:     Patient ID: Terrell Patel is a 59 y.o. female. [facility] Clinical Progress Notes      Specialized Services Documentation  Therapist must complete separate progress note for each specific clinical activity in which the individual participated during the day. Group Psychotherapy - Support Systems  This group was facilitated face to face in a private office setting using HIPAA compliant protocols. Terrell Patel attended group on support systems. Today group members focused on examining their personal support networks. Members were provided a video detailed asking for help appropriately. They also identified areas they needed more support. The group processed support systems roles, healthy vs unhealthy supports,and emotions. The group members also brainstormed ideas on how to develop supports, get support, and be a support. We also discussed the importance of asking for help. To end group, each member identified one support they were going to develop over the week. Terrell Patel made some efforts towards progress goals which were displayed through some participation in the discussion. Terrell Patel participated once in the group discussion and shared her  is both and positive and negative support. Tx Plan Objective:1.1,1.2,1.4  Therapist: TODD Francis Co-Facilitated: SARATH Burk    Education Therapy   [id number] Terrell Patel actively shared in morning assessment and goal review. Presented as Receptive related to readiness to learn. Terrell Patel did complete goal from last treatment day identifying a gain of advocacy. did not present with any barriers to learning. [phone] Terrell Patel engaged throughout the treatment day. Was engaged in learning related to Illness, Medication, Aftercare, and Wellness Tools.  Staff utilized Verbal, Written, A/V, and Demonstration teaching methods. Pretty De Jesus did not attend the wrap up group.      Tx Plan Objective: 1.1,1.2,1.4, Therapist: TODD King Co-Facilitated: SARATH Doyle

## 2023-07-24 NOTE — PSYCH
Subjective:     Patient ID: Terrell Patel is a 59 y.o. female. [facility] Clinical Progress Notes      Specialized Services Documentation  Therapist must complete separate progress note for each specific clinical activity in which the individual participated during the day. Group Psychotherapy - Anxiety  This group was facilitated virtually in a private office using HIPAA Compliant and Approved [employer] Teams. Terrell Patel consented to the use of tele-video modality of treatment. [id number] Terrell Patel was present in psychoeducational group about anxiety. The group followed a slide show presentation on Anxiety and the Brain. Group was educated on:  • Signs and symptoms of anxiety   • Causes and treatments of anxiety  • How the brain processes anxiety  • Group members then discussed anxiety causing situations and positive ways to cope with these stressors. • Coping skills discussed were breathing paired with sitting/noticing with our thoughts and emotions exercise, challenging irrational thoughts, progressive muscle relaxation, Dropping anchor, deep breathing, and imagery. Good progress towards goal noted through participation in group. Continue psychoeducational groups on depression to teach the value of learning about diagnosis and treatments available.      Tx Plan Objective:  1.1, 1.2, 1.3, 1.4 Therapist:  Carmen CRUZ RN and Ken ROACH

## 2023-07-24 NOTE — PSYCH
Virtual Regular Visit    Verification of patient location:    Patient is located at Home in the following state in which I hold an active license [place]      Assessment/Plan:    Problem List Items Addressed This Visit        Other    MDD (major depressive disorder), recurrent severe, without psychosis ([address]) - Primary (Chronic)    JACOBY (generalized anxiety disorder)    PROVISIONAL Body dysmorphic disorder    Panic disorder without agoraphobia    Social anxiety disorder       Goals addressed in session:           Reason for visit is PHP VIRTUAL GROUP DUE TO virtual preference of care      Encounter provider [facility] [place]    [place] [address] [address] [address]      Recent Visits  Date Type Provider Dept   07/20/23 Office Visit [facility] PARTIAL PSYCH GROUP THERAPY [facility]   07/17/23 Office Visit [facility] PARTIAL PSYCH GROUP THERAPY [facility]   Showing recent visits within past 7 days and meeting all other requirements  Future Appointments  No visits were found meeting these conditions. Showing future appointments within next 150 days and meeting all other requirements       The patient was identified by name and date of birth. Ronan Serrato was informed that this is a telemedicine visit and that the visit is being conducted [employer]. She agrees to proceed. .  My office door was closed. No one else was in the room. She acknowledged consent and understanding of privacy and security of the video platform. The patient has agreed to participate and understands they can discontinue the visit at any time. Patient is aware this is a billable service. Subjective  Ronan Serrato is a 59 y.o. female  .       HPI     Past Medical History:   Diagnosis Date   • Anxiety    • SVT (supraventricular tachycardia) ([address])        Past Surgical History: Procedure Laterality Date   • CARDIAC ELECTROPHYSIOLOGY PROCEDURE N/A 05/30/2023    Procedure: Cardiac eps/svt ablation;  Surgeon: Cartre Ayala MD;  [facility]; Service: Cardiology   • JOINT REPLACEMENT  2010    right hip       Current Outpatient Medications   Medication Sig Dispense Refill   • clonazePAM (KlonoPIN) 1 mg tablet Take 1 mg by mouth 3 (three) times a day     • DULoxetine (Cymbalta) 60 mg delayed release capsule Take 1 capsule (60 mg total) by mouth daily 14 capsule 0   • QUEtiapine Fumarate (SEROQUEL PO) Take 50 mg by mouth 2 (two) times a day Was prescribed 100mg up to QID she reports, but dose not tolerated     • sertraline (Zoloft) 100 mg tablet Take 75 mg by mouth daily       No current facility-administered medications for this visit. No Known Allergies    Review of Systems    Video Exam    There were no vitals filed for this visit.     Physical Exam     I spent FOUR GROUP HOURS PLUS CASE MANAGEMENT minutes with patient today in which greater than 50% of time was spent in counseling/coordination of care regarding PHP - SEE NOTES

## 2023-07-24 NOTE — PSYCH
Subjective:     Patient ID: Reji Snell is a 59 y.o. female. [facility] Clinical Progress Notes      Specialized Services Documentation  Therapist must complete separate progress note for each specific clinical activity in which the individual participated during the day. Group Psychotherapy Life Skills ([id number]) Reji Snell actively engaged in group focused on the labels which others put on us and the labels we put on ourselves which was facilitated virtually in a private office using HIPAA Compliant and Approved [employer] Teams. Steven Miller consented to the use of tele-video modality of treatment and was virtually present for group psychotherapy today. Group members watched a [employer] talk, The Power of Labels to Change Your Story. During the group members did an activity about the labels that people put on us and that we put on ourselves. Group members discussed the impact these labels have on their lives. The group explored which labels they want to get rid of and which they would like to believe more. Reji Snell would like to believe the label of being an advocate. We discussed how to get rid of the negative labels and how their lives would change if they stopped believing them. Reji Snell continues to make progress towards goals through verbal participation in group; to accomplish long term goals continue to utilize skills learned in programming. Continue with psychotherapy to educate and encourage use of wellness tools.  Tx Plan Objective: 1.1,1.2 Therapist: Yuliya Guerrero

## 2023-07-24 NOTE — PSYCH
Visit Time    Visit Start Time: 6271  Visit Stop Time: 3176  Total Visit Duration: 16 minutes    Subjective:     Patient ID: Nick Georges is a 59 y.o. female. [facility] Clinical Progress Notes      Specialized Services Documentation  Therapist must complete separate progress note for each specific clinical activity in which the individual participated during the day. Case Management Note    [facility]    Current suicide risk : Low      Met with Nick Georges. Reported that she had a busy weekend at work - her challenge was also at work related to being irritated by "younger workers not doing their jobs". She was irritated and advocated for herself but has a hard time feeling she did so without alienating others. She reports she still has highs/lows and did not like the irritability she felt. Challenged her to recognize that wellness does not mean not experiencing emotions like irritability if their is a justifiable cause - given her work situation. She likes to work because it "tires her out" and she isn't anxious then. Reviewed med check and schedule for week. Medications changes/added/denied? No    Treatment session number: 12 IOP 2    Individual Case Management Visit provided today?  Yes     [facility] follow up physician's orders: na

## 2023-07-25 ENCOUNTER — APPOINTMENT (OUTPATIENT)
Dept: PSYCHOLOGY | Facility: CLINIC | Age: 63
End: 2023-07-25
Payer: COMMERCIAL

## 2023-07-25 ENCOUNTER — TELEPHONE (OUTPATIENT)
Dept: PSYCHIATRY | Facility: CLINIC | Age: 63
End: 2023-07-25

## 2023-07-25 ENCOUNTER — DOCUMENTATION (OUTPATIENT)
Dept: PSYCHOLOGY | Facility: CLINIC | Age: 63
End: 2023-07-25

## 2023-07-25 NOTE — PROGRESS NOTES
Subjective:     Patient ID: Naima Wood is a 59 y.o. female. [facility] Clinical Progress Notes      Specialized Services Documentation  Therapist must complete separate progress note for each specific clinical activity in which the individual participated during the day. Case Management Note    MARLEY Iraheta    Current suicide risk : Unable to assess due to absence. Naima Wood excused due to IOP status. Medications changes/added/denied? No    Treatment session number: N/A    Individual Case Management Visit provided today? No    [facility] follow up physician's orders: None at this time.

## 2023-07-26 ENCOUNTER — APPOINTMENT (OUTPATIENT)
Dept: PSYCHOLOGY | Facility: CLINIC | Age: 63
End: 2023-07-26
Payer: COMMERCIAL

## 2023-07-27 ENCOUNTER — APPOINTMENT (OUTPATIENT)
Dept: PSYCHOLOGY | Facility: CLINIC | Age: 63
End: 2023-07-27
Payer: COMMERCIAL

## 2023-07-27 ENCOUNTER — DOCUMENTATION (OUTPATIENT)
Dept: PSYCHOLOGY | Facility: CLINIC | Age: 63
End: 2023-07-27

## 2023-07-27 NOTE — PROGRESS NOTES
Subjective:     Patient ID: Terrell Patel is a 59 y.o. female. [facility] Clinical Progress Notes      Specialized Services Documentation  Therapist must complete separate progress note for each specific clinical activity in which the individual participated during the day. Other Terrell Patel excused due to IOP status.      Nicole Vega MT-BC

## 2023-07-28 ENCOUNTER — TELEMEDICINE (OUTPATIENT)
Dept: PSYCHIATRY | Facility: CLINIC | Age: 63
End: 2023-07-28
Payer: COMMERCIAL

## 2023-07-28 ENCOUNTER — OFFICE VISIT (OUTPATIENT)
Dept: PSYCHOLOGY | Facility: CLINIC | Age: 63
End: 2023-07-28
Payer: COMMERCIAL

## 2023-07-28 DIAGNOSIS — F41.1 GAD (GENERALIZED ANXIETY DISORDER): ICD-10-CM

## 2023-07-28 DIAGNOSIS — F33.2 MDD (MAJOR DEPRESSIVE DISORDER), RECURRENT SEVERE, WITHOUT PSYCHOSIS (HCC): Primary | Chronic | ICD-10-CM

## 2023-07-28 DIAGNOSIS — F33.2 MDD (MAJOR DEPRESSIVE DISORDER), RECURRENT SEVERE, WITHOUT PSYCHOSIS (HCC): ICD-10-CM

## 2023-07-28 DIAGNOSIS — F40.10 SOCIAL ANXIETY DISORDER: ICD-10-CM

## 2023-07-28 DIAGNOSIS — F41.0 PANIC DISORDER WITHOUT AGORAPHOBIA: ICD-10-CM

## 2023-07-28 DIAGNOSIS — F45.22 BODY DYSMORPHIC DISORDER: ICD-10-CM

## 2023-07-28 DIAGNOSIS — F41.1 GAD (GENERALIZED ANXIETY DISORDER): Primary | ICD-10-CM

## 2023-07-28 PROCEDURE — 99213 OFFICE O/P EST LOW 20 MIN: CPT

## 2023-07-28 PROCEDURE — 90832 PSYTX W PT 30 MINUTES: CPT

## 2023-07-28 PROCEDURE — S9480 INTENSIVE OUTPATIENT PSYCHIA: HCPCS

## 2023-07-28 RX ORDER — DULOXETIN HYDROCHLORIDE 60 MG/1
60 CAPSULE, DELAYED RELEASE ORAL DAILY
Qty: 30 CAPSULE | Refills: 0 | Status: SHIPPED | OUTPATIENT
Start: 2023-07-28

## 2023-07-28 NOTE — PSYCH
Visit Time    Visit Start Time: 0930  Visit Stop Time: 6839  Total Visit Duration: 60 minutes    Subjective:     Patient ID: Freddy Traore is a 59 y.o. female. [facility] Clinical Progress Notes      Specialized Services Documentation  Therapist must complete separate progress note for each specific clinical activity in which the individual participated during the day. Group Psychotherapy - Wellness Assessment  This group was facilitated virtually in a private office using HIPAA compliant and approved [employer] teams. Freddy Traore consented to the use of tele-video modality of treatment. Freddy Traore attended group on the Wellness Assessment. The group engaged in the wellness assessment, which evaluates progress on several different areas of wellness/wellbeing: physical, emotional, cognitive, vocational, social and spiritual. Clients rated their progress and discussed areas that need work. By completing and discussing areas of progress, goals and challenges, members are connected and reminded that, in their mental health struggle, they are not alone. Topics of discussion revolved around setting goals, coping with change, methods to achieve goals. Freddy Traore made some progress towards goals through participation in group activity and personal disclosures. Alexa shared her strongest wellness domain is vocational.   Tx Plan Objective:1.1,1.2,1.4  Therapist: Chloe Romero, [address] [place] [address] Facilitator: Bhaskar Mitchell [place]    Education Therapy   [id number] Freddy Traore actively shared in morning assessment and goal review. Presented as Receptive related to readiness to learn. Freddy Traore did complete goal from last treatment day identifying a gain of advocacy. did not present with any barriers to learning. [id number] Freddy Traore engaged throughout the treatment day. Was engaged in learning related to Illness, Medication, Aftercare, and Wellness Tools.  Staff utilized Verbal, Written, A/V, and Demonstration teaching methods. Jeana Hopson shared area of learning and set a goal for outside of program to relax.       Tx Plan Objective: 1.1,1.2,1.4, Therapist: Mily Fernando, [address] [place] [address] Facilitator: SARATH Fiore

## 2023-07-28 NOTE — PSYCH
[facility] Discharge Instructions:   Disposition: home  Address: [address] [place], [place] [id number] . Diagnosis:  Encounter Diagnoses   Name Primary? • MDD (major depressive disorder), recurrent severe, without psychosis ([address]) Yes   • JACOBY (generalized anxiety disorder)    • PROVISIONAL Body dysmorphic disorder    • Panic disorder without agoraphobia    • Social anxiety disorder      . Allergies (Drug/Food): No Known Allergies  Activity: no activity changes  Diet:no recommendations  Smoking Cessation: The best thing you can do to improve your health is to stop using tobacco  Diagnostic/Laboratory Orders: none ordered  Vaccines: If you received a vaccine, please notify your family physician on your next visit. For more information, please call [phone]. Follow-up appointments/Referrals:   OP Medication Management  [facility] [address] [facility] [address] [place] [address]   August 24, 2023 11am    Referral placed for therapy as well    [facility] [phone]. Crisis Intervention (Emergency) [place] Service: C/M/P: [phone]. [place] Crisis Intervention Hotline: 8-517.110.2324. National Suicide Crisis Hotline: 2-705.494.4238. I, the undersigned, have received and understand the above instructions.         Patient/Rep Signature: __________________________________     Date/Time: ______________     Physician Signature: ____________________________________    Date/Time: ______________       Signature: ________________________________     Date/Time: ______________

## 2023-07-28 NOTE — PSYCH
Virtual Regular Visit    Verification of patient location:    Patient is located at Home in the following state in which I hold an active license [place]      Assessment/Plan:    Problem List Items Addressed This Visit        Other    MDD (major depressive disorder), recurrent severe, without psychosis ([address]) - Primary (Chronic)    JACOBY (generalized anxiety disorder)    PROVISIONAL Body dysmorphic disorder    Panic disorder without agoraphobia    Social anxiety disorder       Goals addressed in session:           Reason for visit is PHP VIRTUAL GROUP DUE TO VIRTUAL PREFERENCE      Encounter provider [facility] [address]    Provider located at [address] [place] [id number]      Recent Visits  Date Type Provider Dept   07/24/23 Office Visit [facility] PSYCH GROUP THERAPY [facility]   Showing recent visits within past 7 days and meeting all other requirements  Future Appointments  No visits were found meeting these conditions. Showing future appointments within next 150 days and meeting all other requirements       The patient was identified by name and date of birth. Eugene Vale was informed that this is a telemedicine visit and that the visit is being conducted [employer]. She agrees to proceed. .  My office door was closed. No one else was in the room. She acknowledged consent and understanding of privacy and security of the video platform. The patient has agreed to participate and understands they can discontinue the visit at any time. Patient is aware this is a billable service. Subjective  Eugene Vale is a 59 y.o. female .       HPI     Past Medical History:   Diagnosis Date   • Anxiety    • SVT (supraventricular tachycardia) ([address])        Past Surgical History:   Procedure Laterality Date   • CARDIAC ELECTROPHYSIOLOGY PROCEDURE N/A 05/30/2023 Procedure: Cardiac eps/svt ablation;  Surgeon: Albertus Nageotte, MD;  Location: [facility]; Service: Cardiology   • JOINT REPLACEMENT  2010    right hip       Current Outpatient Medications   Medication Sig Dispense Refill   • clonazePAM (KlonoPIN) 1 mg tablet Take 1 mg by mouth 3 (three) times a day     • DULoxetine (Cymbalta) 60 mg delayed release capsule Take 1 capsule (60 mg total) by mouth daily 30 capsule 0   • QUEtiapine Fumarate (SEROQUEL PO) Take 50 mg by mouth 2 (two) times a day Was prescribed 100mg up to QID she reports, but dose not tolerated     • sertraline (Zoloft) 100 mg tablet Take 100 mg by mouth daily       No current facility-administered medications for this visit. No Known Allergies    Review of Systems    Video Exam    There were no vitals filed for this visit. Physical Exam     Visit Time    I spent FOUR GROUP HOURS PLUS CASE MANAGEMENT minutes with patient today in which greater than 50% of the time was spent in counseling/coordination of care regarding PHP - SEE NOTES.

## 2023-07-28 NOTE — PSYCH
Subjective:     Patient ID: Varney Boeck is a 59 y.o. female. [facility] Clinical Progress Notes      Specialized Services Documentation  Therapist must complete separate progress note for each specific clinical activity in which the individual participated during the day. Group Psychotherapy Life Skills ([id number]) Varney Boeck actively engaged in an open processing group which was facilitated virtually in a private office using HIPAA Compliant and Approved [employer] Teams. Dawn Boston consented to the use of tele-video modality of treatment and was virtually present for group psychotherapy today. The group discussed communicating assertively vs angrily, communicating boundaries, and a few different communication tools . Dawn Boston did participate in the conversations related to the topics. Dawn Boston continues to make progress towards goals through verbal participation in group; to accomplish long term goals continue to utilize skills learned in programming. Continue with psychotherapy to educate and encourage use of wellness tools.  Tx Plan Objective: 1.1,1.2 Therapist: Joi Denis ; Co-facilitated with Cinthia Duff RN-BSN

## 2023-07-28 NOTE — PSYCH
Subjective:     Patient ID: Yann Valentine is a 59 y.o. female. [facility] Clinical Progress Notes      Specialized Services Documentation  Therapist must complete separate progress note for each specific clinical activity in which the individual participated during the day. Group Psychotherapy (10:45-11:45) This group was facilitated virtually in a private office using HIPAA Compliant and Approved [employer] Teams. Yann Valentine consented to the use of tele-video modality of treatment and was present for this group. This group was on self-esteem. The group began with participants reading together a list of quotes on self esteem and reflecting on quotes they connected with and explaining why. Participants then reviewed and discussed the concept of self esteem, signs of low self esteem and its impacts. Participants reflected on ways of coping and increasing self esteem. Roberta Shi did not participate in this group. It is recommended she continue participation in group to meet her treatment goals.      Tx Goal Objective 1.1,1.2  Therapist: Garner Leventhal, LSW

## 2023-07-28 NOTE — PSYCH
Progress Note - Behavioral Health   [facility], [facility]  Varney Boeck 59 y.o. female MRN: [id number]     Progress at partial program: improving. Dawn Boston seen today, patient reports continued moderate to high levels of chronic anxiety. Recent medication change last week ineffective, believes she did better on Zoloft 100 mg daily and we will return to this prior dose while maintaining her on current dose of Cymbalta 60 mg daily. She is unable to identify any cause of increased anxiety over the past week and hopeful for improvement with medication change. Reports decreased anxiety when she is active and talking to others, increased anxiety when she is alone. She enjoys going to work at [employer] and interacting with others although recently has gotten into minor verbal arguments with coworkers related to politics which may be playing a role in her increased anxiety. Reports adequate sleep and appetite. Reports occasional passive SI, denies feeling suicidal today and agreeable to go to the ER if suicidal ideation with a plan ever develops. She is pleasant, constricted on virtual encounter. Continue participation in group to help with coping skills, emotional regulation.   Patient is highly interested in individual psychotherapy after completing the partial program.          Current Medication:     Current Outpatient Medications:   •  clonazePAM (KlonoPIN) 1 mg tablet, Take 1 mg by mouth 3 (three) times a day, Disp: , Rfl:   •  DULoxetine (Cymbalta) 60 mg delayed release capsule, Take 1 capsule (60 mg total) by mouth daily, Disp: 14 capsule, Rfl: 0  •  QUEtiapine Fumarate (SEROQUEL PO), Take 50 mg by mouth 2 (two) times a day Was prescribed 100mg up to QID she reports, but dose not tolerated, Disp: , Rfl:   •  sertraline (Zoloft) 100 mg tablet, Take 75 mg by mouth daily, Disp: , Rfl:   Medication side effects: denies    ROS:   As above otherwise negative    Active Ambulatory Problems     Diagnosis Date Noted   • JACOBY (generalized anxiety disorder)    • SVT (supraventricular tachycardia) ([facility])    • Smoking    • MDD (major depressive disorder), recurrent severe, without psychosis ([address]) 06/30/2023   • PROVISIONAL Body dysmorphic disorder 06/30/2023   • Panic disorder without agoraphobia 06/30/2023   • Social anxiety disorder 06/30/2023     Resolved Ambulatory Problems     Diagnosis Date Noted   • Marijuana use      Past Medical History:   Diagnosis Date   • Anxiety      Family History   Problem Relation Age of Onset   • Depression Mother    • Anxiety disorder Mother    • Alcohol abuse Father    • Depression Brother    • Anxiety disorder Brother      Social History     Socioeconomic History   • Marital status: /Civil Union     Spouse name: Not on file   • Number of children: Not on file   • Years of education: Not on file   • Highest education level: Not on file   Occupational History   • Not on file   Tobacco Use   • Smoking status: Every Day     Packs/day: 1.00     Types: Cigarettes   • Smokeless tobacco: Never   Substance and Sexual Activity   • Alcohol use: Not Currently   • Drug use: Not Currently     Comment: medical marijuana   • Sexual activity: Not Currently     Partners: Male   Other Topics Concern   • Not on file   Social History Narrative   • Not on file     Social Determinants of Health     Financial Resource Strain: Not on file   Food Insecurity: Not on file   Transportation Needs: Not on file   Physical Activity: Not on file   Stress: Not on file   Social Connections: Not on file   Intimate Partner Violence: Not on file   Housing Stability: Not on file     No Known Allergies       Mental Status Evaluation:    Appearance:  age appropriate, casually dressed   Behavior:  cooperative   Speech:  normal rate and volume   Mood:  depressed, anxious   Affect:  constricted   Thought Process:  organized, coherent   Associations: intact associations   Thought Content:  no overt delusions   Perceptual Disturbances: none   Risk Potential: Suicidal ideation - None at present  Homicidal ideation - None   Sensorium:  oriented to person, place and time/date   Memory:  recent and remote memory grossly intact   Consciousness:  alert and awake   Attention: attention span and concentration are age appropriate   Insight:  age appropriate   Judgment: age appropriate   Gait/Station: in bed   Motor Activity: no abnormal movements       Laboratory results:   I have personally reviewed all pertinent laboratory/tests results. Most Recent Labs:   Lab Results   Component Value Date    WBC 10.33 (H) 05/30/2023    RBC 4.59 05/30/2023    HGB 13.3 05/30/2023    HCT 39.8 05/30/2023     05/30/2023    RDW 14.0 05/30/2023    NEUTROABS 9.08 (H) 05/05/2023    SODIUM 138 05/30/2023    K 4.2 05/30/2023     (H) 05/30/2023    CO2 26 05/30/2023    BUN 13 05/30/2023    CREATININE 0.87 05/30/2023    GLUC 118 05/30/2023    GLUF 118 (H) 05/30/2023    CALCIUM 8.8 05/30/2023    AST 16 05/05/2023    ALT 23 05/05/2023    ALKPHOS 93 05/05/2023    TP 7.3 05/05/2023    ALB 3.3 (L) 05/05/2023    TBILI 0.21 05/05/2023    MJO8JMIBXTRC 1.935 02/25/2023    HGBA1C 6.1 (H) 02/15/2021     02/15/2021         Assessment   Diagnoses and all orders for this visit:    JACOBY (generalized anxiety disorder)    MDD (major depressive disorder), recurrent severe, without psychosis ([address])    Panic disorder without agoraphobia         Plan    Planned medication and treatment changes:    -Patient reports increased anxiety with recent decrease in Zoloft on 7/19/23, she would like to return to Zoloft 100 mg daily and we will keep current Cymbalta 60 mg daily. Continue Seroquel 50 mg twice daily for anxiety. Continues Klonopin 1 mg 3 times daily for severe anxiety. Discussed with patient the risks of sedation, respiratory depression, impairment of ability to drive and potential for abuse and addiction related to treatment with benzodiazepine medications. The patient understands risk of treatment with benzodiazepine medications, agrees to not drive if feels impaired and agrees to take medications as prescribed. All current active medications have been reviewed. Continue treatment with group therapy and partial program      Risks / Benefits of Treatment:    Risks, benefits, and possible side effects of medications explained to patient and patient verbalizes understanding and agrees to medications. Counseling / Coordination of Care:    Patient's progress discussed with staff in treatment team meeting. Medications, treatment progress and treatment plan reviewed with patient. Telemedicine consent    Patient: Aminata Harry  Provider: WILL Briscoe    The patient was identified by name and date of birth. Aminata Harry was informed that this is a telemedicine visit and that the visit is being conducted through the [employer]. She agrees to proceed. .  My office door was closed. No one else was in the room. She acknowledged consent and understanding of privacy and security of the video platform. The patient has agreed to participate and understands they can discontinue the visit at any time.         WILL Briscoe 07/28/23  Visit Time    Visit Start Time: 929  Visit Stop Time: 129  Total Visit Duration: 15 minutes

## 2023-07-28 NOTE — PSYCH
Visit Time    Visit Start Time: 1101  Visit Stop Time: 1440  Total Visit Duration: 20 minutes    Subjective:     Patient ID: Terrell Patel is a 59 y.o. female. [facility] Clinical Progress Notes      Specialized Services Documentation  Therapist must complete separate progress note for each specific clinical activity in which the individual participated during the day. Case Management Note    [facility]    Current suicide risk : Low     This case management session was facilitated virtually in a private office using HIPAA Compliant and Approved [employer] Teams. Terrell Patel consented to the use of tele-video modality of treatment. Met with Terrell Patel to discuss weekend plans and supports. Terrell Patel identified work remains a positive however is still experiencing highs and lows. She remains focused on the medication versus her role in filling her time. She was encouraged to explore what she can do this weekend to do so outside of work. Discussed treatment and she is feeling prepared for discharge on Monday - reviewed aftercare. Medications changes/added/denied? Yes     Treatment session number: 13 iop 3    Individual Case Management Visit provided today?  Yes     [facility] follow up physician's orders: see Pepe Dudley note

## 2023-07-31 ENCOUNTER — OFFICE VISIT (OUTPATIENT)
Dept: PSYCHOLOGY | Facility: CLINIC | Age: 63
End: 2023-07-31
Payer: COMMERCIAL

## 2023-07-31 DIAGNOSIS — F45.22 BODY DYSMORPHIC DISORDER: ICD-10-CM

## 2023-07-31 DIAGNOSIS — F41.0 PANIC DISORDER WITHOUT AGORAPHOBIA: ICD-10-CM

## 2023-07-31 DIAGNOSIS — F40.10 SOCIAL ANXIETY DISORDER: ICD-10-CM

## 2023-07-31 DIAGNOSIS — F33.2 MDD (MAJOR DEPRESSIVE DISORDER), RECURRENT SEVERE, WITHOUT PSYCHOSIS (HCC): Primary | Chronic | ICD-10-CM

## 2023-07-31 DIAGNOSIS — F41.1 GAD (GENERALIZED ANXIETY DISORDER): ICD-10-CM

## 2023-07-31 PROCEDURE — S9480 INTENSIVE OUTPATIENT PSYCHIA: HCPCS

## 2023-07-31 PROCEDURE — 90832 PSYTX W PT 30 MINUTES: CPT

## 2023-07-31 NOTE — PSYCH
Visit Time    Visit Start Time: 9818  Visit Stop Time: 4781  Total Visit Duration: 60 minutes    Subjective:     Patient ID: Zeinab Sheppard is a 59 y.o. female. [facility] Clinical Progress Notes      Specialized Services Documentation  Therapist must complete separate progress note for each specific clinical activity in which the individual participated during the day. Group Psychotherapy - Cognitive Defusion  This group was facilitated virtually in a private office using HIPAA compliant and approved [employer] teams. Zeinab Sheppard consented to the use of tele-video modality of treatment Zeinab Sheppard attended group- Cognitive Defusion. Today, members focused on learning about defusion and how to integrate its techniques within their daily lives. Members practiced specific techniques and discussed:  • Fusion vs. Defusion   • Defusion strategies and techniques  • Defusion exercise  • Benefits of defusion   • Supplemental materials/worksheets  Group members shared pieces of information from these sections and identified ways they could practice/integrate these skills in their life. This writer encouraged the group members to use the supplemental materials inside and outside of the program. Zeinab Sheppard made minimal efforts towards progress goals which were displayed through minimal participation and remaining off camera.    Tx Plan Objective: 1.1,1.2,1.4  Therapist: [address]

## 2023-07-31 NOTE — PSYCH
Visit Time    Visit Start Time: 9327  Visit Stop Time: 5651  Total Visit Duration: 22 minutes    Subjective:     Patient ID: Yann Valentine is a 59 y.o. female. [facility] Clinical Progress Notes      Specialized Services Documentation  Therapist must complete separate progress note for each specific clinical activity in which the individual participated during the day. Case Management Note    [facility]    Current suicide risk : Low     This case management session was facilitated virtually in a private office using HIPAA Compliant and Approved [employer] Teams. Yann Valentine consented to the use of tele-video modality of treatment. Met with Yann Valentine. Reviewed and assisted in completion of safety/crisis plan found in My Chart, aftercare plan, and medication list (copies provided). Yann Valentine shared improvement through being more aware of negative thinking patterns and skills to reframe them, using meditation, and some decrease in physical symptoms. She is also back to work and less tearful. Her PHQ-9 was a 15 (22 upon admission). Denied SI, HI, and psychosis. Aftercare providers to receive summary. I,Linette Davila,am physically unable to provide a signature; however, I understand the nature of and am in agreement with the documentation presented to me via TEAMS. I have received a copy through My Chart and/or the [employer]. I freely give verbal consent. Name of Document (s): discharge instructions and crisis plan  Witness to verbal consent: Maria C Oliva  Witness to verbal consent: Melissa Lively      Medications changes/added/denied? No    Treatment session number: 14 IOP 4    Individual Case Management Visit provided today? Yes     [facility] follow up physician's orders: discharge at the end of the treatment day.  Dr. Safia Grimm, DO

## 2023-07-31 NOTE — BH CRISIS PLAN
Client Name: Rosa Lizarraga       Client YOB: 1960  : 1960    Treatment Team (include name and contact information):     Psychotherapist: to be determined     Psychiatrist: oDrita Moreira - [phone]   Release of information completed: yes      Healthcare Provider  [address] [facility] DO [place]  [address]  [place] [phone]       Plan Type: adolescent/adult (15 and over) Initial      My Personal Strengths are (in the client's own words):  "resilient, personable, empathetic and sympathetic, a good personality"    The stressors and triggers that may put me at risk are:  loss of horse/empty barn, chronic anxiety and chronic pain    Coping skills I can use to keep myself calm and safe: Other (describe) exercise, listen to music, crafting, watching TV      The people that provide me with help and support: (Include name, contact, and how they can help)   Support person #1:     * Phone number: in cell phone    * How can they help me? Talks with me, picks up on small cues, asks questions     Support person #2:Maribell    * Phone number: sees at work    * How can they help me? Talking; compare notes; help one another    In the past, the following has helped me in times of crisis:    Other: riding horses, exercise, work, talking to spouse, structured dinner time, socializing at work      If it is an emergency and you need immediate help, call -    If there is a possibility of danger to yourself or others, call the following crisis hotline resources:     Adult Crisis Numbers  [address] [place] [address]   [address] [place] [address] Street: [phone]    Please note: Some [facility] do not have a separate number for Child/Adolescent specific crisis.  If your county is not listed under Child/Adolescent, please call the adult number for your county     National Talk to Text Line   All Ages - [phone]    In the event your feelings become unmanageable, and you cannot reach your support system, you will call 911 immediately or go to the nearest hospital emergency room.

## 2023-07-31 NOTE — PSYCH
Subjective:     Patient ID: Sheryle Mart is a 59 y.o. female. [facility] Clinical Progress Notes      Specialized Services Documentation  Therapist must complete separate progress note for each specific clinical activity in which the individual participated during the day. Group Psychotherapy Life Skills ([id number]) Sheryle Mart actively engaged in group focused on recognizing accomplishments which was facilitated virtually in a private office using HIPAA Compliant and Approved [employer] Teams. Lottie Orona consented to the use of tele-video modality of treatment and was virtually present for group psychotherapy today. During group we discussed the accomplishments that they achieved today. We explored the reasons why it is so difficult to acknowledge those accomplishments. Group members watched a video, "To achieve success, start detecting your small wins."  Reviewed the action steps discussed in the video. Encouraged group members to look at the accomplishments that they are proud of and the small steps they took to achieve their goal. Discussed creating smaller realistic achievable goals. We discussed why it is important to recognize the accomplishments no matter how big or small they seem. Lottie Orona continues to make progress towards goals through verbal participation in group; to accomplish long term goals continue to utilize skills learned in programming. Continue with psychotherapy to educate and encourage use of wellness tools. Tx Plan Objective: 1.1,1.2 Therapist: SARATH Turcios    Education Therapy   [id number] Sheryle Mart actively shared in morning assessment and goal review. Presented as Receptive related to readiness to learn. Sheryle Mart did complete goal from last treatment day identifying gaining responsiblity. did not present with any barriers to learning. [phone] Sheryle Mart engaged throughout the treatment day.  Was engaged in learning related to Illness, Medication, Aftercare, and Wellness Tools. Staff utilized Verbal, Written, A/V, and Demonstration teaching methods. Fran Heller shared area of learning and set a goal for outside of program to stay calm, and change my thought pattern.       Tx Plan Objective: 1.1,1.2,1.4 Therapist:  MARLEY Lentz, SARATH

## 2023-07-31 NOTE — PSYCH
Assessment/Plan:               Encounter Diagnoses   Name Primary? • MDD (major depressive disorder), recurrent severe, without psychosis ([address]) Yes   • JACOBY (generalized anxiety disorder)        Subjective:      Patient ID: Pretty De Jesus is a 59 y.o. female.     [facility] Treatment Plan   AREAS OF NEED: Depression and anxiety as evidenced by excessive worry, panic, isolation, lack of enjoyment, decrease in energy, sleep disturbance, hopelessness, trouble concentrating related to fears of aging and death of horses. Date Initiated: 6/30/2023     Strengths: family support, enjoys going to work, willing to reach out for help           LONG TERM GOAL:   Date Initiated: 6/30/2023  1.0 I will identify 3 signs that my anxiety has decreased in intensity and frequency and am more active in my day to day life. Target Date: 7/28/2023  Completion Date: 07/31/23      SHORT TERM OBJECTIVES:      Date Initiated: 6/30/2023  1.1 I will identify 3 mindfulness and/or distress tolerance skills I can use to manage fear thoughts  Revision Date: continue 7/12/2023  Target Date: 7/12/2023  Completion Date: 07/31/23      Date Initiated: 6/30/2023  1.2 I will identify 3 activities from [facility] list I can do and pick 1 daily to engage in  Revision Date: continue 7/12/2023  Target Date: 7/12/2023  Completion Date: 07/31/23      Date Initiated: 6/30/2023  1.3 I will take medications as prescribed and share questions and concerns if arise. Revision Date: continue 7/12/2023  Target Date: 7/12/2023  Completion Date:  07/31/23      Date Initiated: 6/30/2023  1.4 I will identify 3 ways my supports can assist in my recovery and agree to staff/support contact as indicated.     Revision Date: continue 7/12/2023  Target Date: 7/12/2023  Completion Date: 07/31/23              7 DAY REVISION:     Date Initiated: 7/12/2023  1.5 I will review my notes and add to my WRAP for at least 10 minutes daily reinforcing and practicing my gains.  Revision Date:   Target Date: 07/31/23   Completion Date:07/31/23         PSYCHIATRY:  Date Initiated: 6/30/2023  Medication Management and Education       Revision Date: 7/12/2023        Continue medication management   The person(s) responsible for carrying out the plan is Joelle Colon DO; Georgia Negrete PA-C     NURSING/SYMPTOM EDUCATION:  Date Initiated: 6/30/2023  1.1, 1.2. 1.3, 1.4 This RN will provide wellness/symptoms and skill education groups three to five days weekly to educate Jina Paci on signs and symptoms of diagnoses, skills to manage, and medication questions that will be addressed by the treatment team.    Revision date: 7/12/2023  1.1,1.2,1.3,1.4,1.5 Continue to encourage Jina Paci to participate in wellness groups daily to learn about symptoms, coping strategies and warning signs to promote relapse prevention. The person(s) responsible for carrying out the plan is Rohini Mcallister RN     PSYCHOLOGY:   Date Initiated: 6/30/2023       1.1, 1.2, 1.4 Provide psychotherapy group 5 times per week to allow opportunity for Jina Paci  to explore stressors and ways of coping. Revision Date: 7/12/2023  1.1,1.2,1.4,1.5 Continue to provide psychotherapy group daily to [address] [place] [address] encourage sharing of stressors, skills and positive change. The person(s) responsible for carrying out the plan is SARATH May, Vera Keller     ALLIED THERAPY:   Date Initiated: 6/30/2023  1.1,1.2 Engage Jina Paci in AT group 5 times daily to encourage development and use of wellness tools to decrease symptoms and promote recovery through meaningful activity. Revision Date: 7/12/2023  1.1,1.2,1.5 Continue to engage Jina Paci to participate in AT group to practice wellness tools within program and transfer to home sharing successes and barriers through healthy task involvement.    The person(s) responsible for carrying out the plan is Ezra Plunkett [facility]     CASE MANAGEMENT:   Date Initiated: 6/30/2023      1.0 This  will meet with [facility]  3-4 times weekly to assess treatment progress, discharge planning, connection to community supports and UR as indicated. Revision Date: 7/12/2023  1.0 Continue to meet with [facility] 3-4 times weekly to assess growth, work toward goals, continued treatment needs, dc planning and use of supports. The person(s) responsible for carrying out the plan is Ezra Plunkett [facility]        TREATMENT REVIEW/COMMENTS:   DISCHARGE CRITERIA:Identify 3 signs of progress and complete relapse prevention plan.     DISCHARGE PLAN: OP care  Estimated Length of Stay:10 treatment days

## 2023-07-31 NOTE — PSYCH
Virtual Regular Visit    Verification of patient location:    Patient is located at Home in the following state in which I hold an active license [place]      Assessment/Plan:    Problem List Items Addressed This Visit        Other    MDD (major depressive disorder), recurrent severe, without psychosis ([address]) - Primary (Chronic)    JACOBY (generalized anxiety disorder)    PROVISIONAL Body dysmorphic disorder    Panic disorder without agoraphobia    Social anxiety disorder       Goals addressed in session:           Reason for visit is PHP VIRTUAL GROUP DUE TO VIRTUAL PREFERENCE      Encounter provider [id number] PART[facility] [address]    Provider located at [address] [place] [id number]      Recent Visits  Date Type Provider Dept   07/28/23 Office Visit [address] PARTIAL PSYCH GROUP THERAPY [facility]   07/24/23 Office Visit [address] PARTIAL PSYCH GROUP THERAPY [facility]   Showing recent visits within past 7 days and meeting all other requirements  Future Appointments  No visits were found meeting these conditions. Showing future appointments within next 150 days and meeting all other requirements       The patient was identified by name and date of birth. Katheryn Trinh was informed that this is a telemedicine visit and that the visit is being conducted [employer]. She agrees to proceed. .  My office door was closed. No one else was in the room. She acknowledged consent and understanding of privacy and security of the video platform. The patient has agreed to participate and understands they can discontinue the visit at any time. Patient is aware this is a billable service. Subjective  Katheryn Trinh is a 59 y.o. female .       HPI     Past Medical History:   Diagnosis Date   • Anxiety    • SVT (supraventricular tachycardia) ([address])        Past Surgical History:   Procedure Laterality Date   • CARDIAC ELECTROPHYSIOLOGY PROCEDURE N/A 05/30/2023    Procedure: Cardiac eps/svt ablation;  Surgeon: Omayra Rhodes MD;  [facility]; Service: Cardiology   • JOINT REPLACEMENT  2010    right hip       Current Outpatient Medications   Medication Sig Dispense Refill   • clonazePAM (KlonoPIN) 1 mg tablet Take 1 mg by mouth 3 (three) times a day     • DULoxetine (Cymbalta) 60 mg delayed release capsule Take 1 capsule (60 mg total) by mouth daily 30 capsule 0   • QUEtiapine Fumarate (SEROQUEL PO) Take 50 mg by mouth 2 (two) times a day Was prescribed 100mg up to QID she reports, but dose not tolerated     • sertraline (Zoloft) 100 mg tablet Take 100 mg by mouth daily       No current facility-administered medications for this visit. No Known Allergies    Review of Systems    Video Exam    There were no vitals filed for this visit. Physical Exam     Visit Time    I spent FOUR GROUP HOURS PLUS CASE MANAGEMENT minutes with patient today in which greater than 50% of the time was spent in counseling/coordination of care regarding PHP - SEE NOTES.

## 2023-07-31 NOTE — PSYCH
Visit Time    Visit Start Time: 6729  Visit Stop Time: 1330  Total Visit Duration: 60 minutes    Subjective:     Patient ID: Johnnie Tovar is a 59 y.o. y.o. female. [facility] Clinical Progress Notes      Specialized Services Documentation  Therapist must complete separate progress note for each specific clinical activity in which the individual participated during the day. This group was facilitated virtually in a private office using HIPAA Compliant and Approved Microsoft Teams. Johnnie Tovar consented to the use of tele-video modality of treatment. Allied Therapy  Johnnie Tovar actively shared in Music Therapy group focused on the concept of mindfulness. Alexa shared in discussion/task related to purpose and goals of mindfulness. She was observed to be engaged in therapist led progressive muscle relaxation. "Living in the Moment" video utilized and create mindfulness mantra for the week. Group discussed specific ways to practice refocusing thoughts to the present and offered encouragement to use these things regularly to manage stressors consistently. Some effort noted toward tx goal.  Discharge at the end of the treatment day.    Tx Plan Objective: 1.1, Therapist:  Berto BUTLER

## 2023-07-31 NOTE — PSYCH
Subjective:     Patient ID: Erna Garcia is a 59 y.o. female. [facility] Discharge Summary:   Admission Date: 6/30/2023  Patient was referred by [facility] in [place]  Discharge Date: 07/31/23   Was this a routine discharge? yes   Diagnosis: Axis I:   1. MDD (major depressive disorder), recurrent severe, without psychosis ([address])        2. JACOBY (generalized anxiety disorder)        3. PROVISIONAL Body dysmorphic disorder        4. Panic disorder without agoraphobia        5. Social anxiety disorder           Treating Physician: Dr Franchesca Brumfield, DO; Dr. Gino Zapata, DO  Treatment Complications: focus on medication and somatic symptoms    Presenting Need:   Per Dr. Franchesca Brumfield:   Lincoln Davila is a 59 y. o. female presents with significant anxiety, worrying about aging, getting sick, her  dying. He is a diabetic but takes care of himself. She has had anxiety for 30+ years. Had a therapist in the past, was in [place]. A psychiatrist as well, but now he is seeing Dr. Emma Serrano who tells her she is 'treatment resistant'. Tried therapy there, a couple, but 'they don't have the experience I need". She was referred by [facility] because of SI and depression, anxiety, difficulty functioning. Onset of symptoms was years ago but worsening course in last 2 months with gradually worsening course since that time. Psychosocial Miles Youngblood frustrated that her medications have not helped but determined to get through what she is dealing with. She pushes through, forces herself out the door to work and it does help when there, but every day her anxiety and depression are a struggle. Among ongoing worries and stressors, she also carries the past with her. Guilt about mother dying in 2011, felt she did not do all she could when she was alive. They had a lot of differences. Her mom had fell down a flight of stairs, on coumadin, had a bleed.  In/out of hospital. She had to make decision to end care. Depression has been difficult as well and together they make functioning very difficulty for her.      She notes medications have not helped her. Currently Klonopin 1mg TID (#100, last filled 2023), Zoloft 200mg daily x8mo, Seroquel 50mg BID (prescribed higher but makes her groggy; does help though). Discussed seroquel side effects. She notes labs are monitored by PCP (independent from networks). He monitors metabolic and other labs (she notes they have been normals, just recently done).    She recently had a cardiac ablation for SVT in May. Helped cardiac issue but did not improve her anxiety. Back pain ongoing, 7/10. Chronic headaches seem tension she thinks historically, not migraines     Stressors: losing her horse 2 months ago (2 were older,  2yr apart, and her 2rd one just ), getting older, "I have a terrible fear" of aging, being sick, dying. Loss of her mother in      Per this writer:   Katheryn Trinh referred to [facility] after visit to [facility] due to increased depression and anxiety. She identifies that she is struggling with sleep, wakes in panic daily, physical headaches, stomach aches associated with anxiety, loss of interests, anhedonia, passive suicidal thoughts, stays at home - isolation, decreased energy, excessive worry, worthlessness. Struggling to get to and function at work. 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 Worries about aging, her spouse, and had to recently put her last horse down.  Spouse also had a physical accident at home and is in wound care for leg injury which appeared quite upsetting to her.     Per Katheryn Trinh: "my anxiety is worse than ever"      Strengths: family support, enjoys going to work, willing to reach out for help    Course of treatment includes:    group counseling, medication management, individual case management, allied therapy, psychoeducation and psychiatric evaluation     Treatment Progress:   Krystyna Restrepo Lola attended 10 PHP and 4 IOP sessions for a total of 14 days at [facility]. Treatment objectives focused on active management of anxiety symptoms and several medication changes were monitored while Alexa was in program. She was reduced on Zoloft and started on Cymbalta with a cross taper. Alexa was an active group member and frequently would share in groups yet struggled to apply the learning. She did work on a [employer] yet when asked what skills she was using would frequently just state "I go to work to cope". She did not appear to want to process how her life has changed since she no longer has horses as their care was a major part of her routine as she would get tearful and then avoid. She also struggled to recognize that wellness is not the absence of anxiety and very much was focused on somatic symptoms and her just "waking up like that" versus skills she could use to start her day more productively. Upon discharge met with Eugene Vale. Reviewed and assisted in completion of safety/crisis plan found in My Chart, aftercare plan, and medication list (copies provided). Eugene Vale shared improvement through being more aware of negative thinking patterns and skills to reframe them, using meditation, and some decrease in physical symptoms. She is also back to work and less tearful. Her PHQ-9 was a 15 (22 upon admission). Denied SI, HI, and psychosis. Aftercare providers to receive summary.     Aftercare recommendations include:   OP Medication Management  [facility] [address], [address] [phone]   August 24, 2023 11am    A referral for [facility] was made    Discharge Medications include:  Current Outpatient Medications:   •  clonazePAM (KlonoPIN) 1 mg tablet, Take 1 mg by mouth 3 (three) times a day, Disp: , Rfl:   •  DULoxetine (Cymbalta) 60 mg delayed release capsule, Take 1 capsule (60 mg total) by mouth daily, Disp: 30 capsule, Rfl: 0  •  QUEtiapine Fumarate (SEROQUEL PO), Take 50 mg by mouth 2 (two) times a day Was prescribed 100mg up to QID she reports, but dose not tolerated, Disp: , Rfl:   •  sertraline (Zoloft) 100 mg tablet, Take 100 mg by mouth daily, Disp: , Rfl:

## 2023-08-07 ENCOUNTER — OFFICE VISIT (OUTPATIENT)
Dept: URGENT CARE | Facility: CLINIC | Age: 63
End: 2023-08-07
Payer: COMMERCIAL

## 2023-08-07 VITALS
DIASTOLIC BLOOD PRESSURE: 72 MMHG | HEART RATE: 81 BPM | HEIGHT: 67 IN | BODY MASS INDEX: 29.19 KG/M2 | TEMPERATURE: 97.8 F | OXYGEN SATURATION: 95 % | SYSTOLIC BLOOD PRESSURE: 131 MMHG | WEIGHT: 186 LBS

## 2023-08-07 DIAGNOSIS — H61.21 HEARING LOSS OF RIGHT EAR DUE TO CERUMEN IMPACTION: Primary | ICD-10-CM

## 2023-08-07 PROCEDURE — 69209 REMOVE IMPACTED EAR WAX UNI: CPT | Performed by: PHYSICIAN ASSISTANT

## 2023-08-07 PROCEDURE — 99213 OFFICE O/P EST LOW 20 MIN: CPT | Performed by: PHYSICIAN ASSISTANT

## 2023-08-07 NOTE — PROGRESS NOTES
[facility]        NAME: Clide Bosworth is a 59 y.o. female  : 1960    MRN: [id number]  DATE: 2023  TIME: 12:28 PM    Assessment and Plan   Hearing loss of right ear due to cerumen impaction [H61.21]  1. Hearing loss of right ear due to cerumen impaction              Patient Instructions   Patient Instructions   Follow-up with your primary care provider in the next 3-5 days. Any new or worsening symptoms develop get re-evaluated sooner or proceed to the ER. Follow up with PCP in 3-5 days. Proceed to  ER if symptoms worsen. Chief Complaint     Chief Complaint   Patient presents with   • Earache     Right side, patient has been using ear drops and they have not been helping. Sounds muffled from that ear, she describes feeling fluid in that ear, and beginning to hurt. Has been getting progressively worst for the past two weeks. History of Present Illness       Patient presents with right ear pain for the past 2 weeks. Started off as fluid in the ear that's gotten worse. Also some decreased hearing. Tried drops without relief. Some mild PND. Denies recent illness, congestion, runny nose, cough, fevers. Review of Systems   Review of Systems   Constitutional: Negative for fever. HENT: Positive for ear pain and hearing loss. Negative for congestion, ear discharge, mouth sores, postnasal drip, rhinorrhea, sinus pressure, sinus pain, sore throat and trouble swallowing.           Current Medications       Current Outpatient Medications:   •  clonazePAM (KlonoPIN) 1 mg tablet, Take 1 mg by mouth 3 (three) times a day, Disp: , Rfl:   •  DULoxetine (Cymbalta) 60 mg delayed release capsule, Take 1 capsule (60 mg total) by mouth daily, Disp: 30 capsule, Rfl: 0  •  QUEtiapine Fumarate (SEROQUEL PO), Take 50 mg by mouth 2 (two) times a day Was prescribed 100mg up to QID she reports, but dose not tolerated, Disp: , Rfl:   •  sertraline (Zoloft) 100 mg tablet, Take 100 mg by mouth daily, Disp: , Rfl:     Current Allergies     Allergies as of 08/07/2023   • (No Known Allergies)            The following portions of the patient's history were reviewed and updated as appropriate: allergies, current medications, past family history, past medical history, past social history, past surgical history and problem list.     Past Medical History:   Diagnosis Date   • Anxiety    • SVT (supraventricular tachycardia) ([address])        Past Surgical History:   Procedure Laterality Date   • CARDIAC ELECTROPHYSIOLOGY PROCEDURE N/A 05/30/2023    Procedure: Cardiac eps/svt ablation;  Surgeon: Heavenly Cole MD;  [facility]: [facility]; Service: Cardiology   • JOINT REPLACEMENT  2010    right hip       Family History   Problem Relation Age of Onset   • Depression Mother    • Anxiety disorder Mother    • Alcohol abuse Father    • Depression Brother    • Anxiety disorder Brother          Medications have been verified. Objective   /72   Pulse 81   Temp 97.8 °F (36.6 °C)   Ht 5' 7" (1.702 m)   Wt 84.4 kg (186 lb)   SpO2 95%   BMI 29.13 kg/m²          Physical Exam     Physical Exam  Constitutional:       Appearance: Normal appearance. HENT:      Head: Normocephalic and atraumatic. Right Ear: Tympanic membrane, ear canal and external ear normal. There is impacted cerumen. Left Ear: Tympanic membrane, ear canal and external ear normal.      Ears:      Comments: TM visualized to be intact and WNL following lavage. Nose: Nose normal.      Mouth/Throat:      Mouth: Mucous membranes are moist.      Pharynx: Oropharynx is clear. Musculoskeletal:      Cervical back: No muscular tenderness. Neurological:      Mental Status: She is alert.    Psychiatric:         Mood and Affect: Mood normal.         Behavior: Behavior normal.         Ear cerumen removal    Date/Time: 8/7/2023 11:00 AM    Performed by: Justin Condon PA-C  Authorized by: Justin Condon PA-C  Universal Protocol:  Consent: Verbal consent obtained. Risks and benefits: risks, benefits and alternatives were discussed  Consent given by: patient  Patient understanding: patient states understanding of the procedure being performed  Patient consent: the patient's understanding of the procedure matches consent given  Procedure consent: procedure consent matches procedure scheduled  Patient identity confirmed: verbally with patient      Patient location:  Clinic  Procedure details:     Local anesthetic:  None    Location:  R ear    Procedure type: irrigation only      Approach:  External  Post-procedure details:     Complication:  None    Hearing quality:  Improved    Patient tolerance of procedure:   Tolerated well, no immediate complications  Comments:      TM visualized to be intact and WNL following lavage

## 2023-08-09 ENCOUNTER — APPOINTMENT (OUTPATIENT)
Dept: RADIOLOGY | Facility: CLINIC | Age: 63
End: 2023-08-09
Payer: COMMERCIAL

## 2023-08-09 ENCOUNTER — TELEPHONE (OUTPATIENT)
Age: 63
End: 2023-08-09

## 2023-08-09 ENCOUNTER — OFFICE VISIT (OUTPATIENT)
Dept: OBGYN CLINIC | Facility: CLINIC | Age: 63
End: 2023-08-09
Payer: COMMERCIAL

## 2023-08-09 VITALS
HEART RATE: 80 BPM | HEIGHT: 67 IN | BODY MASS INDEX: 29.82 KG/M2 | DIASTOLIC BLOOD PRESSURE: 80 MMHG | WEIGHT: 190 LBS | SYSTOLIC BLOOD PRESSURE: 118 MMHG

## 2023-08-09 DIAGNOSIS — M51.36 DEGENERATIVE DISC DISEASE, LUMBAR: ICD-10-CM

## 2023-08-09 DIAGNOSIS — R29.898 WEAKNESS OF BOTH HIPS: ICD-10-CM

## 2023-08-09 DIAGNOSIS — M62.9 HAMSTRING TIGHTNESS OF BOTH LOWER EXTREMITIES: ICD-10-CM

## 2023-08-09 DIAGNOSIS — M47.816 LUMBAR FACET ARTHROPATHY: Primary | ICD-10-CM

## 2023-08-09 DIAGNOSIS — M47.816 LUMBAR FACET ARTHROPATHY: ICD-10-CM

## 2023-08-09 DIAGNOSIS — S39.012A LUMBAR STRAIN, INITIAL ENCOUNTER: ICD-10-CM

## 2023-08-09 DIAGNOSIS — M53.3 SACROILIAC JOINT DYSFUNCTION: ICD-10-CM

## 2023-08-09 PROCEDURE — 72110 X-RAY EXAM L-2 SPINE 4/>VWS: CPT

## 2023-08-09 PROCEDURE — 99204 OFFICE O/P NEW MOD 45 MIN: CPT | Performed by: FAMILY MEDICINE

## 2023-08-09 RX ORDER — MELOXICAM 15 MG/1
15 TABLET ORAL DAILY
Qty: 30 TABLET | Refills: 1 | Status: SHIPPED | OUTPATIENT
Start: 2023-08-09

## 2023-08-09 NOTE — PROGRESS NOTES
Assessment/Plan:  Assessment/Plan   Diagnoses and all orders for this visit:    Lumbar facet arthropathy  -     meloxicam (Mobic) 15 mg tablet; Take 1 tablet (15 mg total) by mouth daily  -     Ambulatory Referral to Physical Therapy; Future    Degenerative disc disease, lumbar  -     XR spine lumbar minimum 4 views non injury; Future  -     Ambulatory Referral to Physical Therapy; Future    Lumbar strain, initial encounter  -     Ambulatory Referral to Physical Therapy; Future    Sacroiliac joint dysfunction  -     Ambulatory Referral to Physical Therapy; Future    Weakness of both hips  -     Ambulatory Referral to Physical Therapy; Future    Hamstring tightness of both lower extremities  -     Ambulatory Referral to Physical Therapy; Future      78-year-old female with low back pain many years duration. Discussed with patient physical exam, imaging studies, impression, and plan. X-rays lumbar spine noted for multilevel disc space narrowing most pronounced at L5-S1, multilevel facet degenerative changes. Physical exam noted for midline tenderness L4-S1 and right paraspinal and right SI joint tenderness. She has limited range of motion with rotating to both sides and with extension. She has atrophy in quadriceps of both thighs and abductors both hips. She has 4/5 strength extension both knees and with flexion and abduction both hips. There is no groin pain with BAL and FADDIR of the hips. Straight leg raise is unremarkable bilaterally. She has normal sensation and patellar reflex both lower extremities. Clinical impression is that she has symptoms from aggravated degenerative changes. I discussed treatment regimen of anti-inflammatory, supplements, and formal therapy. Invasive management such as injection and surgery not warranted at this time. She is to take meloxicam 15 mg once daily with food for 30 days and not to take ibuprofen or Aleve, but may take Tylenol.   She is to take turmeric at least 1000 mg daily, tart cherry at least 1000 mg daily, and glucosamine 2-3 times a day. He may benefit from electrical stimulation so I will refer her for home electrical stimulation unit to be used 30 minutes twice daily. She is to start aqua therapy as soon as possible and do home exercises as directed. She will follow-up as needed. Subjective:   Patient ID: Erna Garcia is a 59 y.o. female. Chief Complaint   Patient presents with   • Lower Back - Pain       78-year-old female presents evaluation of low back pain many years duration. 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 She states she was diagnosed with herniated disks and treated conservatively. She reports worsening of symptoms over the past few years affecting her work. She has pain described as localized to the lumbosacral aspect of the spine but worse on the right, constant, radiating distally to the right lower extremity to the level of the knee, worse activity, and improved with resting. She denies numbness or tingling or bowel bladder continence. She manages symptoms with taking Tylenol. Back Pain  This is a chronic problem. The current episode started more than 1 year ago. The problem occurs daily. The problem has been gradually worsening. Pertinent negatives include no abdominal pain, arthralgias, chest pain, chills, fever, joint swelling, numbness, rash, sore throat or weakness. The symptoms are aggravated by standing, walking, twisting and bending. She has tried rest, position changes and acetaminophen for the symptoms. The treatment provided mild relief. The following portions of the patient's history were reviewed and updated as appropriate: She  has a past medical history of Anxiety and SVT (supraventricular tachycardia) ([address]). She has No Known Allergies. .    Review of Systems   Constitutional: Negative for chills and fever. HENT: Negative for sore throat. Eyes: Negative for visual disturbance. Respiratory: Negative for shortness of breath. Cardiovascular: Negative for chest pain. Gastrointestinal: Negative for abdominal pain. Genitourinary: Negative for flank pain. Musculoskeletal: Positive for back pain. Negative for arthralgias and joint swelling. Skin: Negative for rash and wound. Neurological: Negative for weakness and numbness. Hematological: Does not bruise/bleed easily. Psychiatric/Behavioral: Negative for self-injury. Objective:  Vitals:    08/09/23 0847   BP: 118/80   Pulse: 80   Weight: 86.2 kg (190 lb)   Height: 5' 7" (1.702 m)     Right Ankle Exam     Muscle Strength   Dorsiflexion:  5/5  Plantar flexion:  5/5      Left Ankle Exam     Muscle Strength   Dorsiflexion:  5/5   Plantar flexion:  5/5       Right Knee Exam     Comments:  Quadriceps atrophy      Left Knee Exam     Comments:  Quadriceps atrophy      Right Hip Exam     Muscle Strength   Abduction: 4/5   Flexion: 4/5     Tests   BAL: negative    Comments:  Negative FADDIR  Hamstring tightness  Abductor atrophy      Left Hip Exam     Muscle Strength   Abduction: 4/5   Flexion: 4/5     Tests   BAL: negative    Comments:  Negative FADDIR  Hamstring tightness  Abductor atrophy      Back Exam     Tenderness   The patient is experiencing tenderness in the lumbar and sacroiliac. Range of Motion   Extension: abnormal   Flexion: normal   Lateral bend right: normal   Lateral bend left: normal   Rotation right: abnormal   Rotation left: abnormal     Muscle Strength   Right Quadriceps:  4/5   Left Quadriceps:  4/5     Tests   Straight leg raise right: negative  Straight leg raise left: negative    Reflexes   Patellar: normal    Other   Sensation: normal          Strength/Myotome Testing     Left Ankle/Foot   Dorsiflexion: 5  Plantar flexion: 5    Right Ankle/Foot   Dorsiflexion: 5  Plantar flexion: 5      Physical Exam  Vitals and nursing note reviewed.    Constitutional: General: She is not in acute distress. Appearance: She is well-developed. She is not ill-appearing or diaphoretic. HENT:      Head: Normocephalic and atraumatic. Eyes:      Conjunctiva/sclera: Conjunctivae normal.   Neck:      Trachea: No tracheal deviation. Cardiovascular:      Rate and Rhythm: Normal rate. Pulmonary:      Effort: Pulmonary effort is normal. No respiratory distress. Abdominal:      General: There is no distension. Musculoskeletal:         General: Tenderness present. No swelling, deformity or signs of injury. Lumbar back: Negative right straight leg raise test and negative left straight leg raise test.   Skin:     General: Skin is warm and dry. Coloration: Skin is not jaundiced or pale. Neurological:      Mental Status: She is alert and oriented to person, place, and time. Psychiatric:         Mood and Affect: Mood normal.         Behavior: Behavior normal.         Thought Content: Thought content normal.         Judgment: Judgment normal.         I have personally reviewed pertinent films in PACS and my interpretation is  X-rays lumbar spine noted for multilevel disc space narrowing most pronounced at L5-S1, multilevel facet degenerative changes. Dragan Ghosh

## 2023-08-09 NOTE — TELEPHONE ENCOUNTER
Caller:     Doctor: Rahel Navarrete    Reason for call: Calling about home electrical stimulation unit. There is no referral in chart.  Can order be placed and sent to either [facility], [facility] or [address] [place] [address]    Call back#: [phone]

## 2023-08-28 ENCOUNTER — OFFICE VISIT (OUTPATIENT)
Dept: PSYCHIATRY | Facility: CLINIC | Age: 63
End: 2023-08-28
Payer: COMMERCIAL

## 2023-08-28 ENCOUNTER — TELEPHONE (OUTPATIENT)
Dept: PSYCHIATRY | Facility: CLINIC | Age: 63
End: 2023-08-28

## 2023-08-28 DIAGNOSIS — F33.2 MDD (MAJOR DEPRESSIVE DISORDER), RECURRENT SEVERE, WITHOUT PSYCHOSIS (HCC): Primary | ICD-10-CM

## 2023-08-28 DIAGNOSIS — F45.22 BODY DYSMORPHIC DISORDER: ICD-10-CM

## 2023-08-28 DIAGNOSIS — F41.1 GAD (GENERALIZED ANXIETY DISORDER): ICD-10-CM

## 2023-08-28 PROCEDURE — 90792 PSYCH DIAG EVAL W/MED SRVCS: CPT

## 2023-08-28 RX ORDER — QUETIAPINE FUMARATE 50 MG/1
50 TABLET, FILM COATED ORAL 2 TIMES DAILY
Qty: 180 TABLET | Refills: 0 | Status: SHIPPED | OUTPATIENT
Start: 2023-08-28

## 2023-08-28 RX ORDER — CLONAZEPAM 1 MG/1
1 TABLET ORAL 3 TIMES DAILY
Qty: 90 TABLET | Refills: 0 | Status: SHIPPED | OUTPATIENT
Start: 2023-08-28

## 2023-08-28 RX ORDER — DULOXETIN HYDROCHLORIDE 30 MG/1
30 CAPSULE, DELAYED RELEASE ORAL 2 TIMES DAILY
Qty: 180 CAPSULE | Refills: 0 | Status: SHIPPED | OUTPATIENT
Start: 2023-08-28

## 2023-08-28 NOTE — PSYCH
Outpatient Psychiatry Intake Exam       PCP: Karin Eastman DO        Identifying information:  Francine Ron is a 59 y.o. female with a history of JACOBY, MDD here for evaluation and determination of mental health management needs. Sources of information:   Information for this evaluation was gathered through direct interview with the patient. Additionally EMR was reviewed. Confidentiality discussion: Limits of confidentiality in cases of safety concerns involving self and others as well as this physician's role as a mandatory  of abuse. They voiced understanding and a desire to continue with the evaluation. SUBJECTIVE     Chief Complaint / reason for visit: " Follow up after partial "    History of Present Illness:    Iris Hernandez is a 81-year-old  female presenting for initial evaluation with past medical history of MDD, JACOBY, body dysmorphic disorder. Recently completed the partial program and presents for follow-up care and medication management. Currently maintained on Cymbalta 60 mg daily, Klonopin 1 mg 3 times daily, Seroquel 50 mg twice daily to manage her chronic anxiety and depression. She is transferring from [facility] to [facility] psychiatry is looking forward to a more positive experience. 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 She believes symptoms are somewhat improved with combination of using the skills learned in partial and current medication regimen. She is interested in finding a psychotherapist who she's comfortable with and can continue developing skills to treat symptoms.   Reports chronic 8/10 depression with periods of dysphoria, low appetite, poor energy, difficulty maintaining sleep, feeling guilty about past decisions made in terms of her mother's end of care, periods of anhedonia, and chronic passive suicidal ideation. Denies active suicidal ideation, agrees to go to the ER if this ever occurs, no history of suicide attempts in her lifetime. Chronic depression and anxiety exacerbated in April 2023 after her horse of 15 years passed away-patient states she is still grieving his loss, supportive counseling provided. Appears very passionate about horses and horseback riding-when she is busy with her horses she feels happy with minimal anxiety or depression. We discussed her desire to get another horse to fill her void. Chronic 7/10 anxiety  worsened by the horse passing away with symptoms of restlessness, irritability constant worry, racing thoughts, visual panic attacks with shortness of breath and upset stomach. Anxiety lessens when she gets her self busy such as working at [employer] as a , states she only works to get out of the house and keep her mind busy. 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 [place] supportive  [place], strong support system with , her brother, friends. No history of flaquito or psychosis. Denies any history of inpatient admission, prior outpatient treatment in [facility]. Multiple medication trials in the past-Paxil caused withdrawal, Celexa ineffective, Prozac cannot recall, Effexor ineffective, BuSpar ineffective, clonidine ineffective, Viibryd ineffective, hydroxyzine not helpful. Has not tried Vraylar or Abilify which we may consider in the future. Reports compliance with current medication regimen and no side effects, she would like to keep regimen the same for now and continue to reassess during follow-ups. Denies any history of suicide attempts or self-harm behaviors in her lifetime. Denies any history of drug or alcohol abuse. Denies any significant trauma, did have a divorce 30 years ago. Added to  psychotherapy wait-list.  Denies current SI.       Onset of symptoms was childhood  with gradually worsening course since that time. Stressors: Her horse of 15 yrs passed away in April, loss of mother in 2011, work stress    HPI ROS:  Medication Side Effects: denies  Depression: 8/10 (10 worst)  Anxiety: 7/10 (10 worst)  Safety (SI, HI, other): passive SI 2-3x week  Sleep: 5-6 hrs  Energy: fair to low  Appetite: 2 meals  Weight Change: denies      In terms of depression, the patient endorses loss of interests/pleasure, depressed mood, change in sleep, loss of energy, change in appetite or weight, thoughts of worthlessness or guilt, thoughts about death or suicide . In terms of bipolar disorder, the patient endorses no. Symptoms include no symptoms    JACOBY symptoms: excessive worry more days than not for longer than 3 months, difficulty concentrating, fatigue, insomnia, irritable and restlessness/keyed up  Panic Disorder symptoms: palpitations/racing heart, sweating, trembling  Social Anxiety symptoms: social anxiety due to fear of judgment or embarassment  OCD Symptoms: No significant symptoms supportive of OCD  Eating Disorder symptoms: no historical or current eating disorder. no binge eating disorder; . no symptoms of bulimia    In terms of PTSD, the patient endorses exposure to trauma involving: denies; intrusive symptoms including (1+): no intrusive symptoms; avoidance symptoms including (1+): no avoidance symptoms; Negative alterations including (2+): no negative alteration symptoms; hyperarousal symptoms including (2+): no arousal symptoms.  Symptoms have been present for greater than 1 month    In terms of psychotic symptoms, the patient reports no psychotic symptoms now or in the past.    Past Psychiatric History  Previous diagnoses include MDD, JACOBY    Prior outpatient psychiatric treatment: [facility]    Prior therapy: yes    Prior inpatient psychiatric treatment: denies    Prior suicide attempts: denies    Prior self harm: denies    Prior violence or aggression: denies    Social History:    The patient grew up in [facility]. Childhood was described as "supportive of her, auguring with each other a lot". During childhood, parents were supportive of her. They have 0 sister(s) and 1 brother(s). Abuse/neglect: none    As far as the patient (or present family member) is aware, overall childhood development: Patient does ascribe to normal developmental milestones such as walking, talking, potty training and making childhood friends. Education level: RN   Current occupation: [employer]  Marital status:   Children: none  Current Living Situation: the patient currently lives with  and brother  . Social support:     Church Affiliation: [employer] experience: denies  Legal history: denies  Access to Guns: denies    Substance use and treatment:  Tobacco use: 1 PPD  Caffeine Use: 2 cups  ETOH use: denies  Other substance use: denies. Endorses previous experimentation with: denies    Longest clean time: not applicable  History of Inpatient/Outpatient rehabilitation program: no      Traumatic History:     Abuse: none  Other Traumatic Events: none     Family Psychiatric History:     Psychiatric Illness:   Mother - depression and anxiety disorder, Brother - anxiety disorder  Substance Abuse:  no family history of substance abuse  Suicide Attempts:  Grandfather committed suicide     Denies     Family History   Problem Relation Age of Onset   • Depression Mother    • Anxiety disorder Mother    • Alcohol abuse Father    • Depression Brother    • Anxiety disorder Brother             Past Medical / Surgical History:    Current PCP: Gina Levy DO   Other providers include:     Patient Active Problem List   Diagnosis   • JACOBY (generalized anxiety disorder)   • SVT (supraventricular tachycardia) ([address])   • Smoking   • MDD (major depressive disorder), recurrent severe, without psychosis ([address])   • PROVISIONAL Body dysmorphic disorder   • Panic disorder without agoraphobia   • Social anxiety disorder Past Medical History-  Past Medical History:   Diagnosis Date   • Anxiety    • SVT (supraventricular tachycardia) (HCC)         Denies     History of Seizures: no  History of Head injury-LOC-Concussion: several head injuries    Past Surgical History-  Past Surgical History:   Procedure Laterality Date   • CARDIAC ELECTROPHYSIOLOGY PROCEDURE N/A 05/30/2023    Procedure: Cardiac eps/svt ablation;  Surgeon: Heidi Ndiaye MD;  Location: [facility]; Service: Cardiology   • JOINT REPLACEMENT  2010    right hip          Allergies:   No Known Allergies    Recent labs:  No visits with results within 1 Month(s) from this visit.    Latest known visit with results is:   Admission on 05/30/2023, Discharged on 05/30/2023   Component Date Value   • Sodium 05/30/2023 138    • Potassium 05/30/2023 4.2    • Chloride 05/30/2023 112 (H)    • CO2 05/30/2023 26    • ANION GAP 05/30/2023 0 (L)    • BUN 05/30/2023 13    • Creatinine 05/30/2023 0.87    • Glucose 05/30/2023 118    • Glucose, Fasting 05/30/2023 118 (H)    • Calcium 05/30/2023 8.8    • eGFR 05/30/2023 71    • WBC 05/30/2023 10.33 (H)    • RBC 05/30/2023 4.59    • Hemoglobin 05/30/2023 13.3    • Hematocrit 05/30/2023 39.8    • MCV 05/30/2023 87    • MCH 05/30/2023 29.0    • MCHC 05/30/2023 33.4    • RDW 05/30/2023 14.0    • Platelets 60/04/7720 258    • MPV 05/30/2023 9.6    • Protime 05/30/2023 12.6    • INR 05/30/2023 0.93    • Ventricular Rate 05/30/2023 81    • Atrial Rate 05/30/2023 81    • MT Interval 05/30/2023 152    • QRSD Interval 05/30/2023 80    • QT Interval 05/30/2023 376    • QTC Interval 05/30/2023 436    • P Axis 05/30/2023 15    • QRS Axis 05/30/2023 25    • T Wave Axis 05/30/2023 20    • Ventricular Rate 05/30/2023 83    • Atrial Rate 05/30/2023 83    • MT Interval 05/30/2023 148    • QRSD Interval 05/30/2023 70    • QT Interval 05/30/2023 374    • QTC Interval 05/30/2023 439    • P Axis 05/30/2023 35    • QRS Axis 05/30/2023 46    • T Wave [place] 05/30/2023 37      Labs were reviewed and discussed with patient    Medical Review Of Systems:    Patient admits to denies; otherwise Pertinent items are noted in HPI. Medications:  Current Outpatient Medications on File Prior to Visit   Medication Sig Dispense Refill   • [DISCONTINUED] DULoxetine (Cymbalta) 60 mg delayed release capsule Take 1 capsule (60 mg total) by mouth daily 30 capsule 0   • [DISCONTINUED] QUEtiapine Fumarate (SEROQUEL PO) Take 50 mg by mouth 2 (two) times a day Was prescribed 100mg up to QID she reports, but dose not tolerated     • meloxicam (Mobic) 15 mg tablet Take 1 tablet (15 mg total) by mouth daily 30 tablet 1   • [DISCONTINUED] clonazePAM (KlonoPIN) 1 mg tablet Take 1 mg by mouth 3 (three) times a day     • [DISCONTINUED] sertraline (Zoloft) 100 mg tablet Take 100 mg by mouth daily       No current facility-administered medications on file prior to visit. Medication Compliant? yes    All current active medications have been reviewed. Objective     OBJECTIVE     There were no vitals taken for this visit.     MENTAL STATUS EXAM  Appearance:  age appropriate, dressed casually   Behavior:  pleasant, cooperative, with good eye contact   Speech:  Normal volume, regular rate and rhythm   Mood:  depressed and anxious   Affect:  mood congruent   Language: intact and appropriate for age, education, and intellect   Thought Process:  Linear and goal directed   Associations: intact associations   Thought Content:  normal and appropriate, negative thinking and cognitive distortions   Perceptual Disturbances: no auditory or visual hallcunations   Risk Potential / Abnormal Thoughts: Suicidal ideation - Denies, chronic passive SI-no hx of suicide attempts in lifetime  Homicidal ideation - None  Potential for aggression - No       Consciousness:  Alert & Awake   Sensorium:  Grossly oriented   Attention: attention span and concentration are age appropriate   Intellect: within normal limits   Fund of Knowledge:  Memory: awareness of current events: yes  recent and remote memory grossly intact   Insight:  good   Judgment: good   Muscle Strength Muscle Tone: normal  normal   Gait/Station: normal gait/station with good balance   Motor Activity: no abnormal movements     Pain none   Pain Scale 0     IMPRESSIONS/FORMULATION          Diagnoses and all orders for this visit:    MDD (major depressive disorder), recurrent severe, without psychosis (HCC)  -     DULoxetine (Cymbalta) 30 mg delayed release capsule; Take 1 capsule (30 mg total) by mouth 2 (two) times a day  -     QUEtiapine (SEROquel) 50 mg tablet; Take 1 tablet (50 mg total) by mouth 2 (two) times a day    JACOBY (generalized anxiety disorder)  -     DULoxetine (Cymbalta) 30 mg delayed release capsule; Take 1 capsule (30 mg total) by mouth 2 (two) times a day  -     clonazePAM (KlonoPIN) 1 mg tablet; Take 1 tablet (1 mg total) by mouth 3 (three) times a day    PROVISIONAL Body dysmorphic disorder    Panic disorder without agoraphobia  -     DULoxetine (Cymbalta) 30 mg delayed release capsule; Take 1 capsule (30 mg total) by mouth 2 (two) times a day        1. MDD (major depressive disorder), recurrent severe, without psychosis ([address])    2. JACOBY (generalized anxiety disorder)    3. PROVISIONAL Body dysmorphic disorder    4. Panic disorder without agoraphobia          Ric Walsh is a 59 y.o. female who presents with symptoms supporting the aforementioned. Suicide / Homicide / Safety risk assessment: At this time Nina Ruiz is at low risk for harm of self or others. Plan:       Education about diagnosis and treatment modalities, patient voiced understanding and agreement with the following plan:    Discussed medication risks, beneftis, alternatives. Patient was informed and had time to ask questions.  They agreed to treatment below    Controlled Medication Discussion:     Nina Ruiz has been filling controlled prescriptions on time as prescribed according to [address] [facility] program.   Discussed with Monica Mac the risks of sedation, respiratory depression, impairment of ability to drive and potential for abuse and addiction related to treatment with benzodiazepine medications. She understands risk of treatment with benzodiazepine medications, agrees to not drive if feels impaired and agrees to take medications as prescribed. Initial treatment plan:   1) MEDS:      -Continue recently adjusted medications from partial program     -Cymbalta 30 mg twice daily for anxiety, depression management   -Seroquel 50 mg twice daily for anxiety, insomnia   -Klonopin 1 mg 3 times daily for severe anxiety and panic- on this medication for over 20 years   Discussed with patient the risks of sedation, respiratory depression, impairment of ability to drive and potential for abuse and addiction related to treatment with benzodiazepine medications. The patient understands risk of treatment with benzodiazepine medications, agrees to not drive if feels impaired and agrees to take medications as prescribed. Patient was also informed of risks of being on or starting opioid medications due to drug interactions and potential for serious respiratory depression and death. 2) Labs: reviewed    3) Therapy:    -Added to therapy with this today    4) Medical:    - Pt will f/u with other providers as needed    5) Other: Support as needed   -Use crisis as needed         6) Follow up:   - Follow up in 4 weeks or sooner if needed   - Patient will call if issues or concerns     7) Treatment Plan:    - Enacted on 8/28/2023, due 2/28/2024     Discussed self monitoring of symptoms, and symptom monitoring tools. Patient has been informed of 24 hours and weekend coverage for urgent situations accessed by calling the main clinic phone number.           Visit Time    Visit Start Time: 150  Visit Stop Time: 230  Total Visit Duration: 40 minutes

## 2023-08-28 NOTE — BH TREATMENT PLAN
TREATMENT PLAN (Medication Management Only)        [address] [facility]    Name and Date of Birth:  Marlon Yu 59 y.o. 1960  Date of Treatment Plan: August 28, 2023  Diagnosis/Diagnoses:    1. MDD (major depressive disorder), recurrent severe, without psychosis ([address])    2. JACOBY (generalized anxiety disorder)    3. PROVISIONAL Body dysmorphic disorder      Strengths/Personal Resources for Self-Care: supportive family, supportive friends, taking medications as prescribed. Area/Areas of need (in own words): anxiety symptoms, depressive symptoms  1. Long Term Goal: improve control of depression. Target Date:6 months - 2/28/2024  Person/Persons responsible for completion of goal: Klaudia Fitting  2. Short Term Objective (s) - How will we reach this goal?:   A. Provider new recommended medication/dosage changes and/or continue medication(s): continue current medications as prescribed. Javier Dancer all scheduled appointments. C. Attend medication management appointments regularly. Target Date:6 months - 2/28/2024  Person/Persons Responsible for Completion of Goal: Klaudia Fitting  Progress Towards Goals: starting treatment  Treatment Modality: medication management every 1 months  Review due 180 days from date of this plan: 6 months - 2/28/2024  Expected length of service: ongoing treatment  My Physician/PA/NP and I have developed this plan together and I agree to work on the goals and objectives. I understand the treatment goals that were developed for my treatment.

## 2023-09-11 ENCOUNTER — TELEPHONE (OUTPATIENT)
Age: 63
End: 2023-09-11

## 2023-09-11 NOTE — TELEPHONE ENCOUNTER
Caller: Self    Doctor: Marlon Rushing    Reason for call: Patient is still experiencing pain, Meloxicam is ineffective, xray was complete. Patient has not begun aqua  Therapy. Is there another type of therapy that can be recommended? She is unable to work and needs a doctor note stating this. What is the next step?     Call back#: [phone]

## 2023-09-12 DIAGNOSIS — M62.9 HAMSTRING TIGHTNESS OF BOTH LOWER EXTREMITIES: ICD-10-CM

## 2023-09-12 DIAGNOSIS — M51.36 DEGENERATIVE DISC DISEASE, LUMBAR: ICD-10-CM

## 2023-09-12 DIAGNOSIS — M47.816 LUMBAR FACET ARTHROPATHY: Primary | ICD-10-CM

## 2023-09-12 DIAGNOSIS — R29.898 WEAKNESS OF BOTH HIPS: ICD-10-CM

## 2023-09-12 DIAGNOSIS — S39.012A LUMBAR STRAIN, INITIAL ENCOUNTER: ICD-10-CM

## 2023-09-12 RX ORDER — DICLOFENAC SODIUM 75 MG/1
75 TABLET, DELAYED RELEASE ORAL 2 TIMES DAILY
Qty: 60 TABLET | Refills: 1 | Status: SHIPPED | OUTPATIENT
Start: 2023-09-12

## 2023-09-13 NOTE — TELEPHONE ENCOUNTER
Caller: Patient     Doctor: Maryann Callejas    Reason for call: Patient is returning call    Call back#: [phone]

## 2023-09-13 NOTE — TELEPHONE ENCOUNTER
Called and spoke with pt and relayed Dr Ena Encinas. Pt would like a work note for physical restrictions as she is a  and twisting and bending add to her pain. She is trying to move to a new position within the company but the company requires a Dr Note to allow her to change her current position to allow her to go into a position where she is not twisting, turning or bending which adds to her pain.  If you are able to write a note pls send it in the pt mychart per Pt request.

## 2023-10-02 ENCOUNTER — TELEMEDICINE (OUTPATIENT)
Dept: PSYCHIATRY | Facility: CLINIC | Age: 63
End: 2023-10-02
Payer: COMMERCIAL

## 2023-10-02 DIAGNOSIS — F41.1 GAD (GENERALIZED ANXIETY DISORDER): ICD-10-CM

## 2023-10-02 DIAGNOSIS — F33.2 MDD (MAJOR DEPRESSIVE DISORDER), RECURRENT SEVERE, WITHOUT PSYCHOSIS (HCC): Primary | ICD-10-CM

## 2023-10-02 DIAGNOSIS — F45.22 BODY DYSMORPHIC DISORDER: ICD-10-CM

## 2023-10-02 PROCEDURE — 99214 OFFICE O/P EST MOD 30 MIN: CPT

## 2023-10-02 RX ORDER — CLONAZEPAM 1 MG/1
1 TABLET ORAL 3 TIMES DAILY
Qty: 90 TABLET | Refills: 0 | Status: SHIPPED | OUTPATIENT
Start: 2023-10-02

## 2023-10-02 RX ORDER — FLUOXETINE HYDROCHLORIDE 20 MG/1
20 CAPSULE ORAL DAILY
Qty: 90 CAPSULE | Refills: 0 | Status: SHIPPED | OUTPATIENT
Start: 2023-10-02

## 2023-10-02 RX ORDER — QUETIAPINE FUMARATE 50 MG/1
50 TABLET, FILM COATED ORAL 3 TIMES DAILY
Qty: 180 TABLET | Refills: 0
Start: 2023-10-02

## 2023-10-02 NOTE — PSYCH
Virtual Regular Visit    Verification of patient location:    Patient is located in the following state in which I hold an active license [place]    Problem List Items Addressed This Visit        Other    MDD (major depressive disorder), recurrent severe, without psychosis ([address]) - Primary (Chronic)    JACOBY (generalized anxiety disorder)    PROVISIONAL Body dysmorphic disorder          Encounter provider WILL Pabon    Provider located at    [id number] [facility]  [address] [facility] [id number] [phone]    Recent Visits  No visits were found meeting these conditions. Showing recent visits within past 7 days and meeting all other requirements  Future Appointments  No visits were found meeting these conditions. Showing future appointments within next 150 days and meeting all other requirements         The patient was identified by name and date of birth. Velia Nelson was informed that this is a telemedicine visit and that the visit is being conducted Axxana. She agrees to proceed. .  My office door was closed. No one else was in the room. She acknowledged consent and understanding of privacy and security of the video platform. The patient has agreed to participate and understands they can discontinue the visit at any time. Patient is aware this is a billable service.      HPI     Current Outpatient Medications   Medication Sig Dispense Refill   • clonazePAM (KlonoPIN) 1 mg tablet Take 1 tablet (1 mg total) by mouth 3 (three) times a day 90 tablet 0   • diclofenac (VOLTAREN) 75 mg EC tablet Take 1 tablet (75 mg total) by mouth 2 (two) times a day 60 tablet 1   • DULoxetine (Cymbalta) 30 mg delayed release capsule Take 1 capsule (30 mg total) by mouth 2 (two) times a day 180 capsule 0   • meloxicam (Mobic) 15 mg tablet Take 1 tablet (15 mg total) by mouth daily 30 tablet 1   • QUEtiapine (SEROquel) 50 mg tablet Take 1 tablet (50 mg total) by mouth 2 (two) times a day 180 tablet 0     No current facility-administered medications for this visit. Review of Systems  Video Exam    There were no vitals filed for this visit. Physical Exam   As a result of this visit, I have referred the patient for further respiratory evaluation. No      VIRTUAL VISIT DISCLAIMER    Dayana Henderson acknowledges that she has consented to an online visit or consultation. She understands that the online visit is based solely on information provided by her, and that, in the absence of a face-to-face physical evaluation by the physician, the diagnosis she receives is both limited and provisional in terms of accuracy and completeness. This is not intended to replace a full medical face-to-face evaluation by the physician. Dayana Henderson understands and accepts these terms. MEDICATION MANAGEMENT NOTE        [facility]    Name and Date of Birth:  Dayana Henderson 59 y.o. 1960 MRN: [id number]    Date of Visit: October 2, 2023    No Known Allergies  SUBJECTIVE:    Alvino Hardin is seen today for a follow up for Major Depressive Disorder and Generalized Anxiety Disorder. She reports that she continues to experience on and off symptoms since the last visit. Patient reports  ineffectiveness of Cymbalta 60 mg daily which was initiated a few months ago during partial program, states she has weaned herself down to 30 mg daily with goal to completely stop in 2 weeks. States she does not want to be on medication that is providing no help with chronic 7/10 depression with symptoms of lack energy motivation, sadness, poor sleep, hopelessness. We discussed her past history of antidepressant usage, has tried many with limited success. States Prozac was the most helpful that she can recall, we will initiate Prozac 20 mg daily for symptoms and patient agreed to plan.   Take Cymbalta 30 mg every other day for 2 weeks for discontinuing. Shares positive news in terms of leaving [employer] and being hired part-time in the nursing field, feels optimistic about the new job and anxiety has become somewhat more manageable since leaving prior unhealthy work environment. Continues to struggle with moderate anxiety and difficulty falling asleep, utilizes Seroquel 50 mg 3 times daily for symptoms. Remains in psychotherapy wait-list and is  looking forward to initiating. Denies SI. She denies any side effects from medications. PLAN:     -Initiate Prozac 20mg for chronic anxiety and depression, states this may have worked the best in past but cannot fully recall. PARQ completed including serotonin syndrome, SIADH, worsening depression, suicidality, induction of flaquito, GI upset, headaches, activation, sexual side effects, sedation, potential drug interactions, and others. - D/c Cymbalta 30 mg due to pt reports ineffectiveness, has weaned down to 30mg daily already, take 30mg every other day for 2 weeks before d/c   -Seroquel 50 mg TID for anxiety, insomnia   -Klonopin 1 mg 3 times daily for severe anxiety and panic- on this medication for over 20 years   Discussed with patient the risks of sedation, respiratory depression, impairment of ability to drive and potential for abuse and addiction related to treatment with benzodiazepine medications. The patient understands risk of treatment with benzodiazepine medications, agrees to not drive if feels impaired and agrees to take medications as prescribed.           Patient was also informed of risks of being on or starting opioid medications due to drug interactions and potential for serious respiratory depression and death        Aware of 24 hour and weekend coverage for urgent situations accessed by calling [facility] main practice number  Referral for individual psychotherapy    Diagnoses and all orders for this visit:    MDD (major depressive disorder), recurrent severe, without psychosis ([address])    JACOBY (generalized anxiety disorder)    PROVISIONAL Body dysmorphic disorder        Current Outpatient Medications on File Prior to Visit   Medication Sig Dispense Refill   • clonazePAM (KlonoPIN) 1 mg tablet Take 1 tablet (1 mg total) by mouth 3 (three) times a day 90 tablet 0   • diclofenac (VOLTAREN) 75 mg EC tablet Take 1 tablet (75 mg total) by mouth 2 (two) times a day 60 tablet 1   • DULoxetine (Cymbalta) 30 mg delayed release capsule Take 1 capsule (30 mg total) by mouth 2 (two) times a day 180 capsule 0   • meloxicam (Mobic) 15 mg tablet Take 1 tablet (15 mg total) by mouth daily 30 tablet 1   • QUEtiapine (SEROquel) 50 mg tablet Take 1 tablet (50 mg total) by mouth 2 (two) times a day 180 tablet 0     No current facility-administered medications on file prior to visit. Psychotherapy Provided:       Supportive counseling provided. Medication changes discussed with [facility]. Medication education provided to [facility]. HPI ROS Appetite Changes and Sleep:     She reports difficulty falling asleep, adequate appetite, low energy.  Denies homicidal ideation, denies suicidal ideation    Review Of Systems:      HPI ROS:               Medication Side Effects:  denies    Depression (10 worst): 7/10    Anxiety (10 worst): 7/10    Safety concerns (SI, HI, etc): Denies si and hi    Sleep: 5 hrs, better with seroquel    Energy: low    Appetite: 2 meals    Weight Change: denies        Mental Status Evaluation:    Appearance Adequate hygiene and grooming   Behavior calm and cooperative   Mood anxious and depressed  Depression Scale - 7 of 10 (0 = No depression)  Anxiety Scale - 7 of 10 (0 = No anxiety)   Speech Normal rate and volume   Affect constricted   Thought Processes Goal directed and coherent   Thought Content Does not verbalize delusional material   Associations Tightly connected   Perceptual Disturbances Denies hallucinations and does not appear to be responding to internal stimuli   Risk Potential Suicidal/Homicidal Ideation - No evidence of suicidal or homicidal ideation and patient does not verbalize suicidal or homicidal ideation  Risk of Violence - No evidence of risk for violence found on assessment  Risk of Self Mutilation - No evidence of risk for self mutilation found on assessment   Orientation oriented to person, place, time/date and situation   Memory recent and remote memory grossly intact   Consciousness alert and awake   Attention/Concentration attention span and concentration are age appropriate   Insight intact   Judgement intact   Muscle Strength and Gait normal muscle strength and normal muscle tone, normal gait/station and normal balance   Motor Activity no abnormal movements   Language no difficulty naming common objects, no difficulty repeating a phrase, no difficulty writing a sentence   Fund of Knowledge adequate knowledge of current events  adequate fund of knowledge regarding past history  adequate fund of knowledge regarding vocabulary          Past Medical History:    Past Medical History:   Diagnosis Date   • Anxiety    • SVT (supraventricular tachycardia) ([address])         Past Surgical History:   Procedure Laterality Date   • CARDIAC ELECTROPHYSIOLOGY PROCEDURE N/A 05/30/2023    Procedure: Cardiac eps/svt ablation;  Surgeon: Destin Hurtado MD;  Location: [facility];   Service: Cardiology   • JOINT REPLACEMENT  2010    right hip     No Known Allergies  Substance Abuse History:    Social History     Substance and Sexual Activity   Alcohol Use Not Currently     Social History     Substance and Sexual Activity   Drug Use Not Currently    Comment: medical marijuana     Social History:    Social History     Socioeconomic History   • Marital status: /Civil Union     Spouse name: Not on file   • Number of children: Not on file   • Years of education: Not on file   • Highest education level: Not on file   Occupational History   • Not on file   Tobacco Use   • Smoking status: Every Day     Packs/day: 1.00     Types: Cigarettes   • Smokeless tobacco: Never   Vaping Use   • Vaping Use: Never used   Substance and Sexual Activity   • Alcohol use: Not Currently   • Drug use: Not Currently     Comment: medical marijuana   • Sexual activity: Not Currently     Partners: Male   Other Topics Concern   • Not on file   Social History Narrative   • Not on file     Social Determinants of Health     Financial Resource Strain: Not on file   Food Insecurity: Not on file   Transportation Needs: Not on file   Physical Activity: Not on file   Stress: Not on file   Social Connections: Not on file   Intimate Partner Violence: Not on file   Housing Stability: Not on file     Family Psychiatric History:     Family History   Problem Relation Age of Onset   • Depression Mother    • Anxiety disorder Mother    • Alcohol abuse Father    • Depression Brother    • Anxiety disorder Brother      History Review: The following portions of the patient's history were reviewed and updated as appropriate: allergies, current medications, past family history, past medical history, past social history, past surgical history and problem list     OBJECTIVE:     Vital signs in last 24 hours: There were no vitals filed for this visit. Laboratory Results:   Recent Labs (last 2 months):   No visits with results within 2 Month(s) from this visit.    Latest known visit with results is:   Admission on 05/30/2023, Discharged on 05/30/2023   Component Date Value   • Sodium 05/30/2023 138    • Potassium 05/30/2023 4.2    • Chloride 05/30/2023 112 (H)    • CO2 05/30/2023 26    • ANION GAP 05/30/2023 0 (L)    • BUN 05/30/2023 13    • Creatinine 05/30/2023 0.87    • Glucose 05/30/2023 118    • Glucose, Fasting 05/30/2023 118 (H)    • Calcium 05/30/2023 8.8    • eGFR 05/30/2023 71    • WBC 05/30/2023 10.33 (H)    • RBC 05/30/2023 4.59    • Hemoglobin 05/30/2023 13.3    • Hematocrit 05/30/2023 39.8 • MCV 05/30/2023 87    • MCH 05/30/2023 29.0    • MCHC 05/30/2023 33.4    • RDW 05/30/2023 14.0    • Platelets 87/61/1967 258    • MPV 05/30/2023 9.6    • Protime 05/30/2023 12.6    • INR 05/30/2023 0.93    • Ventricular Rate 05/30/2023 81    • Atrial Rate 05/30/2023 81    • PA Interval 05/30/2023 152    • QRSD Interval 05/30/2023 80    • QT Interval 05/30/2023 376    • QTC Interval 05/30/2023 436    • P Axis 05/30/2023 15    • QRS Axis 05/30/2023 25    • T Wave Axis 05/30/2023 20    • Ventricular Rate 05/30/2023 83    • Atrial Rate 05/30/2023 83    • PA Interval 05/30/2023 148    • QRSD Interval 05/30/2023 70    • QT Interval 05/30/2023 374    • QTC Interval 05/30/2023 439    • P Axis 05/30/2023 35    • QRS Axis 05/30/2023 46    • T Wave Axis 05/30/2023 37      I have personally reviewed all pertinent laboratory/tests results. Suicide/Homicide Risk Assessment:    Risk of Harm to Self:  Protective Factors: no current suicidal ideation, access to mental health treatment, being , compliant with medications, compliant with mental health treatment, good self-esteem, having a desire to be alive  Based on today's assessment, Omero Liu presents the following risk of harm to self: minimal    Risk of Harm to Others:  Based on today's assessment, Omero Liu presents the following risk of harm to others: none    The following interventions are recommended: referral for psychotherapy    Medications Risks/Benefits:      Risks, Benefits And Possible Side Effects Of Medications:    Discussed risks and benefits of treatment with patient including risk of suicidality, serotonin syndrome, increased QTc interval and SIADH related to treatment with antidepressants;  Risk of induction of manic symptoms in certain patient populations, risk of parkinsonian symptoms, metabolic syndrome, tardive dyskinesia and neuroleptic malignant syndrome related to treatment with antipsychotic medications and risks of misuse, abuse or dependence, sedation and respiratory depression related to treatment with benzodiazepine medications     Controlled Medication Discussion:     Gilberto Beach has been filling controlled prescriptions on time as prescribed according to [address] Program  Discussed with Gilberto Beach the risks of sedation, respiratory depression, impairment of ability to drive and potential for abuse and addiction related to treatment with benzodiazepine medications. She understands risk of treatment with benzodiazepine medications, agrees to not drive if feels impaired and agrees to take medications as prescribed  Discussed with Garrick Hernández warning on concurrent use of benzodiazepines and opioid medications including sedation, respiratory depression, coma and death. She understands the risk of treatment with benzodiazepines in addition to opioids and wants to continue taking those medications  Discussed with Gilberto Beach increased risk of impairment related to concurrent use of benzodiazepines and hypnotic medications including excessive sedation, psychomotor impairment and respiratory depression. She understands the risk of treatment with benzodiazepines in addition to hypnotic medications and wants to continue taking those medications    Treatment Plan:    Due for update/Updated:   no  Last treatment plan done 8/28/23. Treatment Plan due on 2/28/24. WILL Christensen 10/02/23    This note was shared with patient.       Visit Time    Visit Start Time: 12  Visit Stop Time: 2771  Total Visit Duration: 20 minutes

## 2023-10-26 NOTE — DISCHARGE INSTRUCTIONS
Supraventricular Tachycardia   WHAT YOU NEED TO KNOW:   Supraventricular tachycardia (SVT) is a condition that causes your heart to beat much faster than it should  SVT is a type of abnormal heart rhythm, called an arrhythmia, that starts in the upper part of your heart  It may last a few seconds or hours to several days  DISCHARGE INSTRUCTIONS:   Call 911 for the following:   · You have any of the following signs of a heart attack:      ¨ Squeezing, pressure, or pain in your chest that lasts longer than 5 minutes or returns    ¨ Discomfort or pain in your back, neck, jaw, stomach, or arm     ¨ Trouble breathing    ¨ Nausea or vomiting    ¨ Lightheadedness or a sudden cold sweat, especially with chest pain or trouble breathing    Return to the emergency department if:   · You have dizziness, lightheadedness, or feel faint  · You have sudden numbness or weakness in your arms or legs  Contact your healthcare provider if:   · Your symptoms get worse, or you have new symptoms  · You have swelling in your ankles or feet  · You have questions or concerns about your condition or care  Medicines:   · Medicines  can help control your heart rate and rhythm  You may need more than one medicine to treat your symptoms  · Take your medicine as directed  Contact your healthcare provider if you think your medicine is not helping or if you have side effects  Tell him or her if you are allergic to any medicine  Keep a list of the medicines, vitamins, and herbs you take  Include the amounts, and when and why you take them  Bring the list or the pill bottles to follow-up visits  Carry your medicine list with you in case of an emergency  How to manage or prevent SVT:   · Perform vagal maneuvers as directed when you have symptoms of SVT  Lie down flat and bear down like you are having a bowel movement  Do this for 10 to 30 seconds  · Do not drink caffeine or alcohol    These can increase your risk for SVT     · Keep a record of your symptoms  Write down what you ate or what you were doing before an episode of SVT  Also write down anything you did to make the SVT stop  Bring your record to follow up visits with your healthcare provider  · Eat heart-healthy foods  These include fruits, vegetables, whole-grain breads, low-fat dairy products, beans, lean meats, and fish  Replace butter and margarine with heart-healthy oils such as olive oil and canola oil  · Exercise regularly and maintain a healthy weight  Ask about the best exercise plan for you  Ask your healthcare provider how much you should weigh  Ask him to help you create a weight loss plan if you are overweight  · Do not smoke  Nicotine and other chemicals in cigarettes and cigars can cause heart and lung damage  Ask your healthcare provider for information if you currently smoke and need help to quit  E-cigarettes or smokeless tobacco still contain nicotine  Talk to your healthcare provider before you use these products  Follow up with your healthcare provider as directed:  Write down your questions so you remember to ask them during your visits  © 2017 [facility] Information is for End User's use only and may not be sold, redistributed or otherwise used for commercial purposes  All illustrations and images included in CareNotes® are the copyrighted property of A [employer] M [employer] Inc  or [employer]  The above information is an  only  It is not intended as medical advice for individual conditions or treatments  Talk to your doctor, nurse or pharmacist before following any medical regimen to see if it is safe and effective for you  oriented to person, place and time

## 2023-10-30 DIAGNOSIS — F41.1 GAD (GENERALIZED ANXIETY DISORDER): ICD-10-CM

## 2023-10-30 RX ORDER — CLONAZEPAM 1 MG/1
1 TABLET ORAL 3 TIMES DAILY
Qty: 90 TABLET | Refills: 0 | Status: SHIPPED | OUTPATIENT
Start: 2023-10-30

## 2023-11-13 ENCOUNTER — TELEMEDICINE (OUTPATIENT)
Dept: PSYCHIATRY | Facility: CLINIC | Age: 63
End: 2023-11-13
Payer: COMMERCIAL

## 2023-11-13 ENCOUNTER — TELEPHONE (OUTPATIENT)
Age: 63
End: 2023-11-13

## 2023-11-13 DIAGNOSIS — F41.1 GAD (GENERALIZED ANXIETY DISORDER): ICD-10-CM

## 2023-11-13 DIAGNOSIS — F33.2 MDD (MAJOR DEPRESSIVE DISORDER), RECURRENT SEVERE, WITHOUT PSYCHOSIS (HCC): Primary | ICD-10-CM

## 2023-11-13 PROCEDURE — 99214 OFFICE O/P EST MOD 30 MIN: CPT

## 2023-11-13 RX ORDER — QUETIAPINE FUMARATE 100 MG/1
100 TABLET, FILM COATED ORAL 3 TIMES DAILY
Qty: 90 TABLET | Refills: 1 | Status: SHIPPED | OUTPATIENT
Start: 2023-11-13

## 2023-11-13 NOTE — PSYCH
Virtual Regular Visit    Verification of patient location:    Patient is located in the following state in which I hold an active license [place]    Problem List Items Addressed This Visit          Other    MDD (major depressive disorder), recurrent severe, without psychosis ([address]) - Primary (Chronic)    JACOBY (generalized anxiety disorder)          Encounter provider WILL Maddox    Provider located at    [address] [facility]  [address] [address] [phone]    Recent Visits  No visits were found meeting these conditions. Showing recent visits within past 7 days and meeting all other requirements  Future Appointments  No visits were found meeting these conditions. Showing future appointments within next 150 days and meeting all other requirements         The patient was identified by name and date of birth. Brook Reis was informed that this is a telemedicine visit and that the visit is being conducted [place]. She agrees to proceed. .  My office door was closed. No one else was in the room. She acknowledged consent and understanding of privacy and security of the video platform. The patient has agreed to participate and understands they can discontinue the visit at any time. Patient is aware this is a billable service.      HPI     Current Outpatient Medications   Medication Sig Dispense Refill    clonazePAM (KlonoPIN) 1 mg tablet Take 1 tablet (1 mg total) by mouth 3 (three) times a day 90 tablet 0    diclofenac (VOLTAREN) 75 mg EC tablet Take 1 tablet (75 mg total) by mouth 2 (two) times a day 60 tablet 1    FLUoxetine (PROzac) 20 mg capsule Take 1 capsule (20 mg total) by mouth daily 90 capsule 0    meloxicam (Mobic) 15 mg tablet Take 1 tablet (15 mg total) by mouth daily 30 tablet 1    QUEtiapine (SEROquel) 50 mg tablet Take 1 tablet (50 mg total) by mouth 3 (three) times a day 180 tablet 0     No current facility-administered medications for this visit. Review of Systems  Video Exam    There were no vitals filed for this visit. Physical Exam   As a result of this visit, I have referred the patient for further respiratory evaluation. No      VIRTUAL VISIT DISCLAIMER    Davion Zamora acknowledges that she has consented to an online visit or consultation. She understands that the online visit is based solely on information provided by her, and that, in the absence of a face-to-face physical evaluation by the physician, the diagnosis she receives is both limited and provisional in terms of accuracy and completeness. This is not intended to replace a full medical face-to-face evaluation by the physician. Davion Zamora understands and accepts these terms. MEDICATION MANAGEMENT NOTE        [facility]    Name and Date of Birth:  Davion Zamora 59 y.o. 1960 MRN: [id number]    Date of Visit: November 13, 2023    No Known Allergies  SUBJECTIVE:    Cristel Goldberg is seen today for a follow up for Major Depressive Disorder and Generalized Anxiety Disorder. She reports that she continues to experience on and off symptoms since the last visit. Patient reports difficulty initiating and maintaining sleep ever since Prozac 20 mg initiated, we will discontinue and initiate Zoloft 50 mg daily for anxiety and depression management. States she was on Zoloft in the past and did relatively well with no significant side effects. Struggling with mild to moderate anxiety related to grieving the loss of her horse earlier this year. 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 She has made decision to acquire another horse and will be receiving it in a few months, this appears to be her passion and hobby, hopeful this will improve her spirits.   Depression remains 6/10 with dysphoria, low energy, sadness. Symptoms have slightly alleviated since she left toxic work environment at [employer], initially was going to start another job but decided to wait and is currently not working but may in the near future to keep her self busy. States she continues to utilize Seroquel 100 mg 3 times daily for anxiety management and is denying all adverse effects. Would highly benefit from regular psychotherapy for coping skills and remains on the wait list. Denies SI. She denies any side effects from medications. PLAN:     -Prozac was too stimulating- keeping patient awake at night, initiate Zoloft 50mg daily for anxiety and depression   PARQ completed including serotonin syndrome, SIADH, worsening depression, suicidality, induction of flaquito, GI upset, headaches, activation, sexual side effects, sedation, potential drug interactions, and others.     -Seroquel 100 mg TID for anxiety, insomnia   -Klonopin 1 mg 3 times daily for severe anxiety and panic- on this medication for over 20 years, using appropriately, hopeful to wean down after learning coping skills once she comes off therapy wait list.  Discussed with patient the risks of sedation, respiratory depression, impairment of ability to drive and potential for abuse and addiction related to treatment with benzodiazepine medications. The patient understands risk of treatment with benzodiazepine medications, agrees to not drive if feels impaired and agrees to take medications as prescribed.  Patient was also informed of risks of being on or starting opioid medications due to drug interactions and potential for serious respiratory depression and death        Aware of 24 hour and weekend coverage for urgent situations accessed by calling [facility] main practice number  Referral for individual psychotherapy    Diagnoses and all orders for this visit:    MDD (major depressive disorder), recurrent severe, without psychosis ([address])    JACOBY (generalized anxiety disorder)        Current Outpatient Medications on File Prior to Visit   Medication Sig Dispense Refill    clonazePAM (KlonoPIN) 1 mg tablet Take 1 tablet (1 mg total) by mouth 3 (three) times a day 90 tablet 0    diclofenac (VOLTAREN) 75 mg EC tablet Take 1 tablet (75 mg total) by mouth 2 (two) times a day 60 tablet 1    FLUoxetine (PROzac) 20 mg capsule Take 1 capsule (20 mg total) by mouth daily 90 capsule 0    meloxicam (Mobic) 15 mg tablet Take 1 tablet (15 mg total) by mouth daily 30 tablet 1    QUEtiapine (SEROquel) 50 mg tablet Take 1 tablet (50 mg total) by mouth 3 (three) times a day 180 tablet 0     No current facility-administered medications on file prior to visit. Psychotherapy Provided:       Supportive counseling provided. Medication changes discussed with Vivianne Fothergill. Medication education provided to Vivianne Fothergill. HPI ROS Appetite Changes and Sleep:     She reports difficulty falling asleep, adequate appetite, low energy.  Denies homicidal ideation, denies suicidal ideation    Review Of Systems:      HPI ROS:               Medication Side Effects:  denies    Depression (10 worst): 6/10    Anxiety (10 worst): 7/10    Safety concerns (SI, HI, etc): Denies si and hi    Sleep: 5 hrs, better with seroquel    Energy: low    Appetite: 2 meals    Weight Change: denies        Mental Status Evaluation:    Appearance Adequate hygiene and grooming   Behavior calm and cooperative   Mood anxious and depressed  Depression Scale - 6 of 10 (0 = No depression)  Anxiety Scale - 7 of 10 (0 = No anxiety)   Speech Normal rate and volume   Affect constricted   Thought Processes Goal directed and coherent   Thought Content Does not verbalize delusional material   Associations Tightly connected   Perceptual Disturbances Denies hallucinations and does not appear to be responding to internal stimuli   Risk Potential Suicidal/Homicidal Ideation - No evidence of suicidal or homicidal ideation and patient does not verbalize suicidal or homicidal ideation  Risk of Violence - No evidence of risk for violence found on assessment  Risk of Self Mutilation - No evidence of risk for self mutilation found on assessment   Orientation oriented to person, place, time/date and situation   Memory recent and remote memory grossly intact   Consciousness alert and awake   Attention/Concentration attention span and concentration are age appropriate   Insight intact   Judgement intact   Muscle Strength and Gait normal muscle strength and normal muscle tone, normal gait/station and normal balance   Motor Activity no abnormal movements   Language no difficulty naming common objects, no difficulty repeating a phrase, no difficulty writing a sentence   Fund of Knowledge adequate knowledge of current events  adequate fund of knowledge regarding past history  adequate fund of knowledge regarding vocabulary          Past Medical History:    Past Medical History:   Diagnosis Date    Anxiety     SVT (supraventricular tachycardia)         Past Surgical History:   Procedure Laterality Date    CARDIAC ELECTROPHYSIOLOGY PROCEDURE N/A 05/30/2023    Procedure: Cardiac eps/svt ablation;  Surgeon: Raulito Lambert MD;  Location:  CARDIAC CATH LAB;   Service: Cardiology    JOINT REPLACEMENT  2010    right hip     No Known Allergies  Substance Abuse History:    Social History     Substance and Sexual Activity   Alcohol Use Not Currently     Social History     Substance and Sexual Activity   Drug Use Not Currently    Comment: medical marijuana     Social History:    Social History     Socioeconomic History    Marital status: /Civil Union     Spouse name: Not on file    Number of children: Not on file    Years of education: Not on file    Highest education level: Not on file   Occupational History    Not on file   Tobacco Use    Smoking status: Every Day     Packs/day: 1.00     Types: Cigarettes    Smokeless tobacco: Never   Vaping Use    Vaping Use: Never used   Substance and Sexual Activity    Alcohol use: Not Currently    Drug use: Not Currently     Comment: medical marijuana    Sexual activity: Not Currently     Partners: Male   Other Topics Concern    Not on file   Social History Narrative    Not on file     Social Determinants of Health     Financial Resource Strain: Not on file   Food Insecurity: Not on file   Transportation Needs: Not on file   Physical Activity: Not on file   Stress: Not on file   Social Connections: Not on file   Intimate Partner Violence: Not on file   Housing Stability: Not on file     Family Psychiatric History:     Family History   Problem Relation Age of Onset    Depression Mother     Anxiety disorder Mother     Alcohol abuse Father     Depression Brother     Anxiety disorder Brother      History Review: The following portions of the patient's history were reviewed and updated as appropriate: allergies, current medications, past family history, past medical history, past social history, past surgical history and problem list     OBJECTIVE:     Vital signs in last 24 hours: There were no vitals filed for this visit. Laboratory Results:   Recent Labs (last 2 months):   No visits with results within 2 Month(s) from this visit.    Latest known visit with results is:   Admission on 05/30/2023, Discharged on 05/30/2023   Component Date Value    Sodium 05/30/2023 138     Potassium 05/30/2023 4.2     Chloride 05/30/2023 112 (H)     CO2 05/30/2023 26     ANION GAP 05/30/2023 0 (L)     BUN 05/30/2023 13     Creatinine 05/30/2023 0.87     Glucose 05/30/2023 118     Glucose, Fasting 05/30/2023 118 (H)     Calcium 05/30/2023 8.8     eGFR 05/30/2023 71     WBC 05/30/2023 10.33 (H)     RBC 05/30/2023 4.59     Hemoglobin 05/30/2023 13.3     Hematocrit 05/30/2023 39.8     MCV 05/30/2023 87     MCH 05/30/2023 29.0     MCHC 05/30/2023 33.4     RDW 05/30/2023 14.0     Platelets 99/99/3647 258     MPV 05/30/2023 9.6     Protime 05/30/2023 12.6     INR 05/30/2023 0.93     Ventricular Rate 05/30/2023 81     Atrial Rate 05/30/2023 81     UT Interval 05/30/2023 152     QRSD Interval 05/30/2023 80     QT Interval 05/30/2023 376     QTC Interval 05/30/2023 436     P Axis 05/30/2023 15     QRS Axis 05/30/2023 25     T Wave Axis 05/30/2023 20     Ventricular Rate 05/30/2023 83     Atrial Rate 05/30/2023 83     UT Interval 05/30/2023 148     QRSD Interval 05/30/2023 70     QT Interval 05/30/2023 374     QTC Interval 05/30/2023 439     P Axis 05/30/2023 35     QRS Axis 05/30/2023 46     T Wave [place] 05/30/2023 37      I have personally reviewed all pertinent laboratory/tests results. Suicide/Homicide Risk Assessment:    Risk of Harm to Self:  Protective Factors: no current suicidal ideation, access to mental health treatment, being , compliant with medications, compliant with mental health treatment, good self-esteem, having a desire to be alive  Based on today's assessment, Luiza Pinzon presents the following risk of harm to self: minimal    Risk of Harm to Others:  Based on today's assessment, Luiza Pinzon presents the following risk of harm to others: none    The following interventions are recommended: referral for psychotherapy    Medications Risks/Benefits:      Risks, Benefits And Possible Side Effects Of Medications:    Discussed risks and benefits of treatment with patient including risk of suicidality, serotonin syndrome, increased QTc interval and SIADH related to treatment with antidepressants;  Risk of induction of manic symptoms in certain patient populations, risk of parkinsonian symptoms, metabolic syndrome, tardive dyskinesia and neuroleptic malignant syndrome related to treatment with antipsychotic medications and risks of misuse, abuse or dependence, sedation and respiratory depression related to treatment with benzodiazepine medications     Controlled Medication Discussion:     Luiza Pinzon has been filling controlled prescriptions on time as prescribed according to [facility] Monitoring Program  Discussed with Glenroy Carter the risks of sedation, respiratory depression, impairment of ability to drive and potential for abuse and addiction related to treatment with benzodiazepine medications. She understands risk of treatment with benzodiazepine medications, agrees to not drive if feels impaired and agrees to take medications as prescribed  Discussed with Bravo Hernández warning on concurrent use of benzodiazepines and opioid medications including sedation, respiratory depression, coma and death. She understands the risk of treatment with benzodiazepines in addition to opioids and wants to continue taking those medications  Discussed with Glenroy Carter increased risk of impairment related to concurrent use of benzodiazepines and hypnotic medications including excessive sedation, psychomotor impairment and respiratory depression. She understands the risk of treatment with benzodiazepines in addition to hypnotic medications and wants to continue taking those medications    Treatment Plan:    Due for update/Updated:   no  Last treatment plan done 8/28/23. Treatment Plan due on 2/28/24. WILL Asher 11/13/23    This note was shared with patient.       Visit Time    Visit Start Time: 2  Visit Stop Time: 220  Total Visit Duration:  20 minutes

## 2023-11-13 NOTE — TELEPHONE ENCOUNTER
Patients GI provider:  ALLISON SALINAS     Number to return call: [phone]    Reason for VISIT: Pt complains of ongoing/persistent stomach issues/ discomfort    Scheduled procedure/appointment date if applicable: Appt 75/72/22

## 2023-11-22 ENCOUNTER — OFFICE VISIT (OUTPATIENT)
Dept: GASTROENTEROLOGY | Facility: CLINIC | Age: 63
End: 2023-11-22
Payer: COMMERCIAL

## 2023-11-22 VITALS
BODY MASS INDEX: 29.64 KG/M2 | DIASTOLIC BLOOD PRESSURE: 78 MMHG | SYSTOLIC BLOOD PRESSURE: 120 MMHG | WEIGHT: 195.6 LBS | HEIGHT: 68 IN | OXYGEN SATURATION: 96 % | HEART RATE: 97 BPM

## 2023-11-22 DIAGNOSIS — K21.9 GASTROESOPHAGEAL REFLUX DISEASE WITHOUT ESOPHAGITIS: Primary | ICD-10-CM

## 2023-11-22 DIAGNOSIS — R10.10 UPPER ABDOMINAL PAIN: ICD-10-CM

## 2023-11-22 DIAGNOSIS — R19.8 CHANGE IN BOWEL FUNCTION: ICD-10-CM

## 2023-11-22 DIAGNOSIS — R11.0 NAUSEA: ICD-10-CM

## 2023-11-22 PROCEDURE — 99203 OFFICE O/P NEW LOW 30 MIN: CPT | Performed by: PHYSICIAN ASSISTANT

## 2023-11-22 RX ORDER — OMEPRAZOLE 20 MG/1
20 TABLET, DELAYED RELEASE ORAL DAILY
COMMUNITY

## 2023-11-22 RX ORDER — OMEPRAZOLE 20 MG/1
20 CAPSULE, DELAYED RELEASE ORAL 2 TIMES DAILY
Qty: 180 CAPSULE | Refills: 3 | Status: SHIPPED | OUTPATIENT
Start: 2023-11-22 | End: 2024-11-16

## 2023-11-22 NOTE — H&P (VIEW-ONLY)
Answers submitted by the patient for this visit:  Abdominal Pain Questionnaire (Submitted on 11/21/2023)  Chief Complaint: Abdominal pain  Chronicity: recurrent  Onset: more than 1 month ago  Onset quality: gradual  Frequency: daily  Episode duration: 7 Hours  Progression since onset: waxing and waning  Pain location: LUQ, RUQ  Pain - numeric: 6/10  Pain quality: aching, burning  Radiates to: epigastric region  anorexia: Yes  arthralgias: Yes  belching: Yes  constipation: No  diarrhea: Yes  dysuria: No  fever: No  flatus: No  frequency: No  headaches: Yes  hematochezia: No  hematuria: No  melena: No  myalgias: Yes  nausea: Yes  weight loss: Yes  vomiting: No  Aggravated by: eating  Relieved by: recumbency  [facility] - Outpatient Consultation  Michelle Cruz 59 y.o. female MRN: [id number]  Encounter: [id number]          ASSESSMENT AND PLAN:      1. Gastroesophageal reflux disease without esophagitis  2. Change in bowel function  3. Nausea  4. Upper abdominal pain  -Patient will be scheduled for an EGD and a colonoscopy. -Will increase omeprazole 20 mg to twice daily dosage.    -Will begin fiber and probiotic daily.    -Patient will be scheduled for follow-up appointment 2 to 3 weeks after her EGD and colonoscopy are complete to discuss further treatment options. ______________________________________________________________________    HPI:    50-year-old female with a past medical history significant for anxiety and SVT who presents to the GI clinic today for consultation. Patient reports that she is here to schedule an EGD and a colonoscopy. Patient reports that she has had ongoing stomach issues for the past several months. Patient reports upper abdominal pain with associated nausea, reflux, and diarrhea. Patient does report that she suffers from anxiety and she knows that this worsens her symptoms. Patient does report a decreased appetite.   Patient is on omeprazole 20 mg daily over-the-counter. Patient reports that generally when she experiences the diarrhea in conjunction with the abdominal discomfort it is occurring about every other day. She does utilize Pepto-Bismol. She denies any NSAID use but does drink a significant amount of coffee. She reports a family history of stomach cancer, colitis, and colon polyps. REVIEW OF SYSTEMS:    CONSTITUTIONAL: Denies any fever, chills, rigors, and weight loss. HEENT: No earache or tinnitus. Denies hearing loss or visual disturbances. CARDIOVASCULAR: No chest pain or palpitations. RESPIRATORY: Denies any cough, hemoptysis, shortness of breath or dyspnea on exertion. GASTROINTESTINAL: As noted in the History of Present Illness. GENITOURINARY: No problems with urination. Denies any hematuria or dysuria. NEUROLOGIC: No dizziness or vertigo, denies headaches. MUSCULOSKELETAL: Denies any muscle or joint pain. SKIN: Denies skin rashes or itching. ENDOCRINE: Denies excessive thirst. Denies intolerance to heat or cold. PSYCHOSOCIAL: Denies depression or anxiety. Denies any recent memory loss. Historical Information   Past Medical History:   Diagnosis Date    Anxiety     SVT (supraventricular tachycardia)      Past Surgical History:   Procedure Laterality Date    CARDIAC ELECTROPHYSIOLOGY PROCEDURE N/A 05/30/2023    Procedure: Cardiac eps/svt ablation;  Surgeon: Shukri Arellano MD;  Location: [facility];   Service: Cardiology    JOINT REPLACEMENT  2010    right hip     Social History   Social History     Substance and Sexual Activity   Alcohol Use Not Currently     Social History     Substance and Sexual Activity   Drug Use Not Currently    Comment: medical marijuana     Social History     Tobacco Use   Smoking Status Every Day    Packs/day: 1.00    Types: Cigarettes   Smokeless Tobacco Never     Family History   Problem Relation Age of Onset    Depression Mother     Anxiety disorder Mother     Alcohol abuse Father Depression Brother     Anxiety disorder Brother        Meds/Allergies       Current Outpatient Medications:     diclofenac (VOLTAREN) 75 mg EC tablet    meloxicam (Mobic) 15 mg tablet    omeprazole (PriLOSEC OTC) 20 MG tablet    omeprazole (PriLOSEC) 20 mg delayed release capsule    QUEtiapine (SEROquel) 100 mg tablet    sertraline (Zoloft) 50 mg tablet    clonazePAM (KlonoPIN) 1 mg tablet    No Known Allergies        Objective     Blood pressure 120/78, pulse 97, height 5' 8" (1.727 m), weight 88.7 kg (195 lb 9.6 oz), SpO2 96 %. Body mass index is 29.74 kg/m². PHYSICAL EXAM:      General Appearance:   Alert, cooperative, no distress   HEENT:   Normocephalic, atraumatic, anicteric. Neck:  Supple, symmetrical, trachea midline   Lungs:   Clear to auscultation bilaterally; no rales, rhonchi or wheezing; respirations unlabored    Heart[de-identified]   Regular rate and rhythm; no murmur, rub, or gallop. Abdomen:   Soft, non-tender, non-distended; normal bowel sounds; no masses, no organomegaly    Genitalia:   Deferred    Rectal:   Deferred    Extremities:  No cyanosis, clubbing or edema    Pulses:  2+ and symmetric    Skin:  No jaundice, rashes, or lesions    Lymph nodes:  No palpable cervical lymphadenopathy        Lab Results:   No visits with results within 1 Day(s) from this visit.    Latest known visit with results is:   Admission on 05/30/2023, Discharged on 05/30/2023   Component Date Value    Sodium 05/30/2023 138     Potassium 05/30/2023 4.2     Chloride 05/30/2023 112 (H)     CO2 05/30/2023 26     ANION GAP 05/30/2023 0 (L)     BUN 05/30/2023 13     Creatinine 05/30/2023 0.87     Glucose 05/30/2023 118     Glucose, Fasting 05/30/2023 118 (H)     Calcium 05/30/2023 8.8     eGFR 05/30/2023 71     WBC 05/30/2023 10.33 (H)     RBC 05/30/2023 4.59     Hemoglobin 05/30/2023 13.3     Hematocrit 05/30/2023 39.8     MCV 05/30/2023 87     MCH 05/30/2023 29.0     MCHC 05/30/2023 33.4     RDW 05/30/2023 14.0 Platelets 40/85/3577 258     MPV 05/30/2023 9.6     Protime 05/30/2023 12.6     INR 05/30/2023 0.93     Ventricular Rate 05/30/2023 81     Atrial Rate 05/30/2023 81     NC Interval 05/30/2023 152     QRSD Interval 05/30/2023 80     QT Interval 05/30/2023 376     QTC Interval 05/30/2023 436     P Axis 05/30/2023 15     QRS Axis 05/30/2023 25     T Wave Axis 05/30/2023 20     Ventricular Rate 05/30/2023 83     Atrial Rate 05/30/2023 83     NC Interval 05/30/2023 148     QRSD Interval 05/30/2023 70     QT Interval 05/30/2023 374     QTC Interval 05/30/2023 439     P Axis 05/30/2023 35     QRS Axis 05/30/2023 46     T Wave [place] 05/30/2023 37          Radiology Results:   No results found.

## 2023-11-22 NOTE — PROGRESS NOTES
Answers submitted by the patient for this visit:  Abdominal Pain Questionnaire (Submitted on 11/21/2023)  Chief Complaint: Abdominal pain  Chronicity: recurrent  Onset: more than 1 month ago  Onset quality: gradual  Frequency: daily  Episode duration: 7 Hours  Progression since onset: waxing and waning  Pain location: LUQ, RUQ  Pain - numeric: 6/10  Pain quality: aching, burning  Radiates to: epigastric region  anorexia: Yes  arthralgias: Yes  belching: Yes  constipation: No  diarrhea: Yes  dysuria: No  fever: No  flatus: No  frequency: No  headaches: Yes  hematochezia: No  hematuria: No  melena: No  myalgias: Yes  nausea: Yes  weight loss: Yes  vomiting: No  Aggravated by: eating  Relieved by: recumbency  [facility]  Jenifer Haider 59 y.o. female MRN: [id number]  Encounter: [id number]          ASSESSMENT AND PLAN:      1. Gastroesophageal reflux disease without esophagitis  2. Change in bowel function  3. Nausea  4. Upper abdominal pain  -Patient will be scheduled for an EGD and a colonoscopy. -Will increase omeprazole 20 mg to twice daily dosage.    -Will begin fiber and probiotic daily.    -Patient will be scheduled for follow-up appointment 2 to 3 weeks after her EGD and colonoscopy are complete to discuss further treatment options. ______________________________________________________________________    HPI:    71-year-old female with a past medical history significant for anxiety and SVT who presents to the GI clinic today for consultation. Patient reports that she is here to schedule an EGD and a colonoscopy. Patient reports that she has had ongoing stomach issues for the past several months. Patient reports upper abdominal pain with associated nausea, reflux, and diarrhea. Patient does report that she suffers from anxiety and she knows that this worsens her symptoms. Patient does report a decreased appetite.   Patient is on omeprazole 20 mg daily over-the-counter. Patient reports that generally when she experiences the diarrhea in conjunction with the abdominal discomfort it is occurring about every other day. She does utilize Pepto-Bismol. She denies any NSAID use but does drink a significant amount of coffee. She reports a family history of stomach cancer, colitis, and colon polyps. REVIEW OF SYSTEMS:    CONSTITUTIONAL: Denies any fever, chills, rigors, and weight loss. HEENT: No earache or tinnitus. Denies hearing loss or visual disturbances. CARDIOVASCULAR: No chest pain or palpitations. RESPIRATORY: Denies any cough, hemoptysis, shortness of breath or dyspnea on exertion. GASTROINTESTINAL: As noted in the History of Present Illness. GENITOURINARY: No problems with urination. Denies any hematuria or dysuria. NEUROLOGIC: No dizziness or vertigo, denies headaches. MUSCULOSKELETAL: Denies any muscle or joint pain. SKIN: Denies skin rashes or itching. ENDOCRINE: Denies excessive thirst. Denies intolerance to heat or cold. PSYCHOSOCIAL: Denies depression or anxiety. Denies any recent memory loss. Historical Information   Past Medical History:   Diagnosis Date    Anxiety     SVT (supraventricular tachycardia)      Past Surgical History:   Procedure Laterality Date    CARDIAC ELECTROPHYSIOLOGY PROCEDURE N/A 05/30/2023    Procedure: Cardiac eps/svt ablation;  Surgeon: Jaja Haney MD;  Location: [facility];   Service: Cardiology    JOINT REPLACEMENT  2010    right hip     Social History   Social History     Substance and Sexual Activity   Alcohol Use Not Currently     Social History     Substance and Sexual Activity   Drug Use Not Currently    Comment: medical marijuana     Social History     Tobacco Use   Smoking Status Every Day    Packs/day: 1.00    Types: Cigarettes   Smokeless Tobacco Never     Family History   Problem Relation Age of Onset    Depression Mother     Anxiety disorder Mother     Alcohol abuse Father Depression Brother     Anxiety disorder Brother        Meds/Allergies       Current Outpatient Medications:     diclofenac (VOLTAREN) 75 mg EC tablet    meloxicam (Mobic) 15 mg tablet    omeprazole (PriLOSEC OTC) 20 MG tablet    omeprazole (PriLOSEC) 20 mg delayed release capsule    QUEtiapine (SEROquel) 100 mg tablet    sertraline (Zoloft) 50 mg tablet    clonazePAM (KlonoPIN) 1 mg tablet    No Known Allergies        Objective     Blood pressure 120/78, pulse 97, height 5' 8" (1.727 m), weight 88.7 kg (195 lb 9.6 oz), SpO2 96 %. Body mass index is 29.74 kg/m². PHYSICAL EXAM:      General Appearance:   Alert, cooperative, no distress   HEENT:   Normocephalic, atraumatic, anicteric. Neck:  Supple, symmetrical, trachea midline   Lungs:   Clear to auscultation bilaterally; no rales, rhonchi or wheezing; respirations unlabored    Heart[de-identified]   Regular rate and rhythm; no murmur, rub, or gallop. Abdomen:   Soft, non-tender, non-distended; normal bowel sounds; no masses, no organomegaly    Genitalia:   Deferred    Rectal:   Deferred    Extremities:  No cyanosis, clubbing or edema    Pulses:  2+ and symmetric    Skin:  No jaundice, rashes, or lesions    Lymph nodes:  No palpable cervical lymphadenopathy        Lab Results:   No visits with results within 1 Day(s) from this visit.    Latest known visit with results is:   Admission on 05/30/2023, Discharged on 05/30/2023   Component Date Value    Sodium 05/30/2023 138     Potassium 05/30/2023 4.2     Chloride 05/30/2023 112 (H)     CO2 05/30/2023 26     ANION GAP 05/30/2023 0 (L)     BUN 05/30/2023 13     Creatinine 05/30/2023 0.87     Glucose 05/30/2023 118     Glucose, Fasting 05/30/2023 118 (H)     Calcium 05/30/2023 8.8     eGFR 05/30/2023 71     WBC 05/30/2023 10.33 (H)     RBC 05/30/2023 4.59     Hemoglobin 05/30/2023 13.3     Hematocrit 05/30/2023 39.8     MCV 05/30/2023 87     MCH 05/30/2023 29.0     MCHC 05/30/2023 33.4     RDW 05/30/2023 14.0 Platelets 85/25/9864 258     MPV 05/30/2023 9.6     Protime 05/30/2023 12.6     INR 05/30/2023 0.93     Ventricular Rate 05/30/2023 81     Atrial Rate 05/30/2023 81     [place] Interval 05/30/2023 152     QRSD Interval 05/30/2023 80     QT Interval 05/30/2023 376     QTC Interval 05/30/2023 436     P Axis 05/30/2023 15     QRS Axis 05/30/2023 25     T Wave Axis 05/30/2023 20     Ventricular Rate 05/30/2023 83     Atrial Rate 05/30/2023 83     [place] Interval 05/30/2023 148     QRSD Interval 05/30/2023 70     QT Interval 05/30/2023 374     QTC Interval 05/30/2023 439     P Axis 05/30/2023 35     QRS Axis 05/30/2023 46     T Wave [place] 05/30/2023 37          Radiology Results:   No results found.

## 2023-11-22 NOTE — PATIENT INSTRUCTIONS
Irritable Bowel Syndrome   WHAT YOU NEED TO KNOW:   Irritable bowel syndrome (IBS) is a condition that prevents food from moving through your intestines normally. The food may move through too slowly or too quickly. This causes abdominal pain, bloating, increased gas, constipation, or diarrhea. DISCHARGE INSTRUCTIONS:   Return to the emergency department if:   You have severe abdominal pain. Your bowel movements are dark or have blood in them. Call your doctor if:   You have a fever. You have pain in your rectum. You are losing weight without trying. Your abdominal pain does not go away, even after treatment. You have questions or concerns about your condition or care. Medicines:   Medicines  help you have a bowel movement, soften your bowel movement, or treat diarrhea. You may also need medicine to relax your muscles. This will decrease abdominal pain and muscles spasms. Take your medicine as directed. Contact your healthcare provider if you think your medicine is not helping or if you have side effects. Tell your provider if you are allergic to any medicine. Keep a list of the medicines, vitamins, and herbs you take. Include the amounts, and when and why you take them. Bring the list or the pill bottles to follow-up visits. Carry your medicine list with you in case of an emergency. Manage IBS:   Eat a variety of healthy foods. You may need to avoid certain foods to decrease your symptoms. Ask your provider about diets that might help your symptoms, such as the low FODMAP diet. Drink liquids as directed. Ask how much liquid to drink each day and which liquids are best for you. For most people, good liquids to drink are water, juice, and milk. Exercise regularly. Ask about the best exercise plan for you. Exercise can decrease your blood pressure and improve your health. Keep a record for 3 weeks. Include everything you eat and drink and your symptoms.  Bring this record with you to your follow-up visits. Follow up with your doctor as directed:  Write down your questions so you remember to ask them during your visits. © Copyright [employer] 2023 Information is for End User's use only and may not be sold, redistributed or otherwise used for commercial purposes. The above information is an  only. It is not intended as medical advice for individual conditions or treatments. Talk to your doctor, nurse or pharmacist before following any medical regimen to see if it is safe and effective for you.   Scheduled date of EGD/colonoscopy (as of today):12/12/23  Physician performing EGD/colonoscopy:Lucina  Location of EGD/colonoscopy:[facility]  Susie bowel prep reviewed with patient:Dulco/Miralax  Instructions reviewed with patient by:Victor Manuel mejia  Clearances:   none

## 2023-11-27 DIAGNOSIS — F41.1 GAD (GENERALIZED ANXIETY DISORDER): ICD-10-CM

## 2023-11-28 RX ORDER — CLONAZEPAM 1 MG/1
1 TABLET ORAL 3 TIMES DAILY
Qty: 90 TABLET | Refills: 0 | Status: SHIPPED | OUTPATIENT
Start: 2023-11-28

## 2023-12-12 ENCOUNTER — HOSPITAL ENCOUNTER (OUTPATIENT)
Dept: GASTROENTEROLOGY | Facility: HOSPITAL | Age: 63
Setting detail: OUTPATIENT SURGERY
Discharge: HOME | End: 2023-12-12
Payer: COMMERCIAL

## 2023-12-12 ENCOUNTER — ANESTHESIA (OUTPATIENT)
Dept: GASTROENTEROLOGY | Facility: HOSPITAL | Age: 63
End: 2023-12-12

## 2023-12-12 ENCOUNTER — ANESTHESIA EVENT (OUTPATIENT)
Dept: GASTROENTEROLOGY | Facility: HOSPITAL | Age: 63
End: 2023-12-12

## 2023-12-12 VITALS
RESPIRATION RATE: 20 BRPM | WEIGHT: 195.11 LBS | SYSTOLIC BLOOD PRESSURE: 115 MMHG | BODY MASS INDEX: 29.57 KG/M2 | HEIGHT: 68 IN | OXYGEN SATURATION: 95 % | TEMPERATURE: 97 F | HEART RATE: 69 BPM | DIASTOLIC BLOOD PRESSURE: 58 MMHG

## 2023-12-12 DIAGNOSIS — Z12.11 COLON CANCER SCREENING: ICD-10-CM

## 2023-12-12 DIAGNOSIS — R19.8 CHANGE IN BOWEL FUNCTION: ICD-10-CM

## 2023-12-12 DIAGNOSIS — K21.9 GASTROESOPHAGEAL REFLUX DISEASE WITHOUT ESOPHAGITIS: ICD-10-CM

## 2023-12-12 PROCEDURE — 88305 TISSUE EXAM BY PATHOLOGIST: CPT | Performed by: STUDENT IN AN ORGANIZED HEALTH CARE EDUCATION/TRAINING PROGRAM

## 2023-12-12 PROCEDURE — 45380 COLONOSCOPY AND BIOPSY: CPT | Performed by: INTERNAL MEDICINE

## 2023-12-12 PROCEDURE — 45385 COLONOSCOPY W/LESION REMOVAL: CPT | Performed by: INTERNAL MEDICINE

## 2023-12-12 PROCEDURE — 43239 EGD BIOPSY SINGLE/MULTIPLE: CPT | Performed by: INTERNAL MEDICINE

## 2023-12-12 RX ORDER — SODIUM CHLORIDE, SODIUM LACTATE, POTASSIUM CHLORIDE, CALCIUM CHLORIDE 600; 310; 30; 20 MG/100ML; MG/100ML; MG/100ML; MG/100ML
INJECTION, SOLUTION INTRAVENOUS CONTINUOUS PRN
Status: DISCONTINUED | OUTPATIENT
Start: 2023-12-12 | End: 2023-12-12

## 2023-12-12 RX ORDER — PHENYLEPHRINE HCL IN 0.9% NACL 1 MG/10 ML
SYRINGE (ML) INTRAVENOUS AS NEEDED
Status: DISCONTINUED | OUTPATIENT
Start: 2023-12-12 | End: 2023-12-12

## 2023-12-12 RX ORDER — LIDOCAINE HYDROCHLORIDE 20 MG/ML
INJECTION, SOLUTION EPIDURAL; INFILTRATION; INTRACAUDAL; PERINEURAL AS NEEDED
Status: DISCONTINUED | OUTPATIENT
Start: 2023-12-12 | End: 2023-12-12

## 2023-12-12 RX ORDER — PROPOFOL 10 MG/ML
INJECTION, EMULSION INTRAVENOUS AS NEEDED
Status: DISCONTINUED | OUTPATIENT
Start: 2023-12-12 | End: 2023-12-12

## 2023-12-12 RX ADMIN — PROPOFOL 120 MG: 10 INJECTION, EMULSION INTRAVENOUS at 08:41

## 2023-12-12 RX ADMIN — PROPOFOL 30 MG: 10 INJECTION, EMULSION INTRAVENOUS at 08:53

## 2023-12-12 RX ADMIN — PROPOFOL 30 MG: 10 INJECTION, EMULSION INTRAVENOUS at 08:57

## 2023-12-12 RX ADMIN — Medication 100 MCG: at 09:06

## 2023-12-12 RX ADMIN — PROPOFOL 50 MG: 10 INJECTION, EMULSION INTRAVENOUS at 08:45

## 2023-12-12 RX ADMIN — PROPOFOL 30 MG: 10 INJECTION, EMULSION INTRAVENOUS at 08:43

## 2023-12-12 RX ADMIN — SODIUM CHLORIDE, SODIUM LACTATE, POTASSIUM CHLORIDE, AND CALCIUM CHLORIDE: .6; .31; .03; .02 INJECTION, SOLUTION INTRAVENOUS at 08:37

## 2023-12-12 RX ADMIN — PROPOFOL 30 MG: 10 INJECTION, EMULSION INTRAVENOUS at 09:01

## 2023-12-12 RX ADMIN — PROPOFOL 30 MG: 10 INJECTION, EMULSION INTRAVENOUS at 09:05

## 2023-12-12 RX ADMIN — PROPOFOL 30 MG: 10 INJECTION, EMULSION INTRAVENOUS at 08:48

## 2023-12-12 RX ADMIN — LIDOCAINE HYDROCHLORIDE 100 MG: 20 INJECTION, SOLUTION EPIDURAL; INFILTRATION; INTRACAUDAL; PERINEURAL at 08:41

## 2023-12-12 NOTE — ANESTHESIA PREPROCEDURE EVALUATION
Procedure:  COLONOSCOPY  EGD    Relevant Problems   CARDIO   (+) SVT (supraventricular tachycardia)      NEURO/PSYCH   (+) JACOBY (generalized anxiety disorder)   (+) MDD (major depressive disorder), recurrent severe, without psychosis (HCC)   (+) Panic disorder without agoraphobia   (+) Social anxiety disorder      PULMONARY   (+) Smoking        Physical Exam    Airway    Mallampati score: II  TM Distance: >3 FB  Neck ROM: full     Dental    upper dentures and lower dentures    Cardiovascular      Pulmonary      Other Findings  post-pubertal.      Anesthesia Plan  ASA Score- 2     Anesthesia Type- IV sedation with anesthesia with ASA Monitors. Additional Monitors:     Airway Plan:            Plan Factors-Exercise tolerance (METS): >4 METS. Chart reviewed. Patient summary reviewed. Induction- intravenous. Postoperative Plan-     Informed Consent- Anesthetic plan and risks discussed with patient. I personally reviewed this patient with the CRNA. Discussed and agreed on the Anesthesia Plan with the CRNA. Jai Forde

## 2023-12-12 NOTE — ANESTHESIA POSTPROCEDURE EVALUATION
Post-Op Assessment Note    CV Status:  Stable  Pain Score: 0    Pain management: adequate       Mental Status:  Alert and awake   Hydration Status:  Euvolemic   PONV Controlled:  Controlled   Airway Patency:  Patent  Two or more mitigation strategies used for obstructive sleep apnea   Post Op Vitals Reviewed: Yes      Staff: Anesthesiologist, CRNA               BP   105/56   Temp   98   Pulse  70   Resp   16   SpO2   96

## 2023-12-12 NOTE — INTERVAL H&P NOTE
H&P reviewed. After examining the patient I find no changes in the patients condition since the H&P had been written.     Vitals:    12/12/23 0800   BP: 127/61   Pulse: 89   Resp: 22   Temp: 97.6 °F (36.4 °C)   SpO2: 97%

## 2023-12-18 PROCEDURE — 88305 TISSUE EXAM BY PATHOLOGIST: CPT | Performed by: STUDENT IN AN ORGANIZED HEALTH CARE EDUCATION/TRAINING PROGRAM

## 2023-12-27 DIAGNOSIS — F41.1 GAD (GENERALIZED ANXIETY DISORDER): ICD-10-CM

## 2023-12-27 RX ORDER — CLONAZEPAM 1 MG/1
1 TABLET ORAL 3 TIMES DAILY
Qty: 45 TABLET | Refills: 0 | Status: SHIPPED | OUTPATIENT
Start: 2023-12-27 | End: 2024-01-11

## 2024-01-08 ENCOUNTER — TELEMEDICINE (OUTPATIENT)
Dept: PSYCHIATRY | Facility: CLINIC | Age: 64
End: 2024-01-08
Payer: COMMERCIAL

## 2024-01-08 DIAGNOSIS — F33.2 MDD (MAJOR DEPRESSIVE DISORDER), RECURRENT SEVERE, WITHOUT PSYCHOSIS (HCC): Primary | ICD-10-CM

## 2024-01-08 DIAGNOSIS — F41.1 GAD (GENERALIZED ANXIETY DISORDER): ICD-10-CM

## 2024-01-08 PROCEDURE — 99214 OFFICE O/P EST MOD 30 MIN: CPT

## 2024-01-08 RX ORDER — QUETIAPINE FUMARATE 100 MG/1
100 TABLET, FILM COATED ORAL 3 TIMES DAILY
Qty: 90 TABLET | Refills: 0 | Status: SHIPPED | OUTPATIENT
Start: 2024-01-08

## 2024-01-08 RX ORDER — SERTRALINE HYDROCHLORIDE 100 MG/1
100 TABLET, FILM COATED ORAL DAILY
Qty: 90 TABLET | Refills: 0 | Status: SHIPPED | OUTPATIENT
Start: 2024-01-08

## 2024-01-08 RX ORDER — CLONAZEPAM 1 MG/1
1 TABLET ORAL 3 TIMES DAILY
Qty: 90 TABLET | Refills: 0 | Status: SHIPPED | OUTPATIENT
Start: 2024-01-08 | End: 2024-02-07

## 2024-01-08 NOTE — PSYCH
Virtual Regular Visit    Verification of patient location:    Patient is located in the following state in which I hold an active license [place]    Problem List Items Addressed This Visit          Other    MDD (major depressive disorder), recurrent severe, without psychosis (HCC) - Primary (Chronic)    JACOBY (generalized anxiety disorder)          Encounter provider WILL Oconnell    Provider located at    [facility]  [address] [facility] [address] [phone]    Recent Visits  No visits were found meeting these conditions.  Showing recent visits within past 7 days and meeting all other requirements  Today's Visits  Date Type Provider Dept   01/08/24 Telemedicine WILL Oconnell  [facility]   Showing today's visits and meeting all other requirements  Future Appointments  No visits were found meeting these conditions.  Showing future appointments within next 150 days and meeting all other requirements         The patient was identified by name and date of birth. Linette Davila was informed that this is a telemedicine visit and that the visit is being conducted throughthe Epic Embedded platform. She agrees to proceed..  My office door was closed. No one else was in the room.  She acknowledged consent and understanding of privacy and security of the video platform. The patient has agreed to participate and understands they can discontinue the visit at any time.    Patient is aware this is a billable service.     HPI     Current Outpatient Medications   Medication Sig Dispense Refill    clonazePAM (KlonoPIN) 1 mg tablet Take 1 tablet (1 mg total) by mouth 3 (three) times a day for 15 days 45 tablet 0    diclofenac (VOLTAREN) 75 mg EC tablet Take 1 tablet (75 mg total) by mouth 2 (two) times a day 60 tablet 1    meloxicam (Mobic) 15 mg tablet Take 1 tablet (15 mg total) by mouth daily 30 tablet 1    omeprazole  (PriLOSEC OTC) 20 MG tablet Take 20 mg by mouth daily Everyday      omeprazole (PriLOSEC) 20 mg delayed release capsule Take 1 capsule (20 mg total) by mouth 2 (two) times a day 180 capsule 3    QUEtiapine (SEROquel) 100 mg tablet Take 1 tablet (100 mg total) by mouth 3 (three) times a day 90 tablet 1    sertraline (Zoloft) 50 mg tablet Take 1 tablet (50 mg total) by mouth daily 90 tablet 0     No current facility-administered medications for this visit.       Review of Systems  Video Exam    There were no vitals filed for this visit.    Physical Exam   As a result of this visit, I have referred the patient for further respiratory evaluation. No      VIRTUAL VISIT DISCLAIMER    Linette Davila acknowledges that she has consented to an online visit or consultation. She understands that the online visit is based solely on information provided by her, and that, in the absence of a face-to-face physical evaluation by the physician, the diagnosis she receives is both limited and provisional in terms of accuracy and completeness. This is not intended to replace a full medical face-to-face evaluation by the physician. Linette Davila understands and accepts these terms.    MEDICATION MANAGEMENT NOTE        [facility]    Name and Date of Birth:  Linette Davila 63 y.o. 1960 MRN: [id number]    Date of Visit: January 8, 2024    No Known Allergies  SUBJECTIVE:    Linette is seen today for a follow up for Major Depressive Disorder and Generalized Anxiety Disorder. She reports that she continues to experience on and off symptoms since the last visit.  Reports high levels of ongoing anxiety and depression 7/10 with catastrophizing, worried about her health, poor motivation, constant worry, racing thoughts, occasional panic attack.  PHQ:20. Struggles to identify any particular stressor, lately she has been anxious anytime she goes outside and is not sure why.   States  that watching the news brings her down, encouraged patient to set limits on television time.  Encouraged patient to increase socialization and attempt grounding techniques when feeling anxious.  On a positive note, she did complete the process of acquiring 2 horses which she is greatly enjoying.  States now that she has something she loves in life, she is constantly worrying about the horses.  Would highly benefit from improved coping skills, she is unsure if partial program would be of help a second time but states she will consider and let provider know.  Remains on psychotherapy wait list and looks forward to initiating.  Denies SI.       She denies any side effects from medications.    PLAN:     -Titrate  Zoloft to 100mg daily for chronic ongoing anxiety and depression, states it worked well in past  PARQ completed including serotonin syndrome, SIADH, worsening depression, suicidality, induction of flaquito, GI upset, headaches, activation, sexual side effects, sedation, potential drug interactions, and others.     -Seroquel 100 mg TID for anxiety, insomnia   -Klonopin 1 mg 3 times daily for severe anxiety and panic- on this medication for over 20 years, using appropriately, provider recommends decreasing usage and if unable to consider entering partial program again to increase coping skills   Discussed with patient the risks of sedation, respiratory depression, impairment of ability to drive and potential for abuse and addiction related to treatment with benzodiazepine medications. The patient understands risk of treatment with benzodiazepine medications, agrees to not drive if feels impaired and agrees to take medications as prescribed. Patient was also informed of risks of being on or starting opioid medications due to drug interactions and potential for serious respiratory depression and death        Aware of 24 hour and weekend coverage for urgent situations accessed by calling [facility] main practice number  Referral for individual psychotherapy    Diagnoses and all orders for this visit:    MDD (major depressive disorder), recurrent severe, without psychosis (HCC)    JACOBY (generalized anxiety disorder)        Current Outpatient Medications on File Prior to Visit   Medication Sig Dispense Refill    clonazePAM (KlonoPIN) 1 mg tablet Take 1 tablet (1 mg total) by mouth 3 (three) times a day for 15 days 45 tablet 0    diclofenac (VOLTAREN) 75 mg EC tablet Take 1 tablet (75 mg total) by mouth 2 (two) times a day 60 tablet 1    meloxicam (Mobic) 15 mg tablet Take 1 tablet (15 mg total) by mouth daily 30 tablet 1    omeprazole (PriLOSEC OTC) 20 MG tablet Take 20 mg by mouth daily Everyday      omeprazole (PriLOSEC) 20 mg delayed release capsule Take 1 capsule (20 mg total) by mouth 2 (two) times a day 180 capsule 3    QUEtiapine (SEROquel) 100 mg tablet Take 1 tablet (100 mg total) by mouth 3 (three) times a day 90 tablet 1    sertraline (Zoloft) 50 mg tablet Take 1 tablet (50 mg total) by mouth daily 90 tablet 0     No current facility-administered medications on file prior to visit.       Psychotherapy Provided:       Supportive counseling provided.  Medication changes discussed with Linette.  Medication education provided to Linette.     HPI ROS Appetite Changes and Sleep:     She reports difficulty falling asleep, adequate appetite, low energy. Denies homicidal ideation, denies suicidal ideation    Review Of Systems:      HPI ROS:               Medication Side Effects:  denies    Depression (10 worst): 7/10    Anxiety (10 worst): 7/10    Safety concerns (SI, HI, etc): Denies si and hi    Sleep: 5-6 hrs, better with seroquel    Energy: low    Appetite: 2 meals    Weight Change: denies        Mental Status Evaluation:    Appearance Adequate hygiene and grooming   Behavior calm and cooperative   Mood anxious and depressed  Depression Scale - 7 of 10 (0 = No depression)  Anxiety Scale - 7 of 10  (0 = No anxiety)   Speech Normal rate and volume   Affect constricted   Thought Processes Goal directed and coherent   Thought Content Does not verbalize delusional material   Associations Tightly connected   Perceptual Disturbances Denies hallucinations and does not appear to be responding to internal stimuli   Risk Potential Suicidal/Homicidal Ideation - No evidence of suicidal or homicidal ideation and patient does not verbalize suicidal or homicidal ideation  Risk of Violence - No evidence of risk for violence found on assessment  Risk of Self Mutilation - No evidence of risk for self mutilation found on assessment   Orientation oriented to person, place, time/date and situation   Memory recent and remote memory grossly intact   Consciousness alert and awake   Attention/Concentration attention span and concentration are age appropriate   Insight intact   Judgement intact   Muscle Strength and Gait normal muscle strength and normal muscle tone, normal gait/station and normal balance   Motor Activity no abnormal movements   Language no difficulty naming common objects, no difficulty repeating a phrase, no difficulty writing a sentence   Fund of Knowledge adequate knowledge of current events  adequate fund of knowledge regarding past history  adequate fund of knowledge regarding vocabulary          Past Medical History:    Past Medical History:   Diagnosis Date    Anxiety     SVT (supraventricular tachycardia)         Past Surgical History:   Procedure Laterality Date    CARDIAC ELECTROPHYSIOLOGY PROCEDURE N/A 05/30/2023    Procedure: Cardiac eps/svt ablation;  Surgeon: Jigar Roe MD;  [facility];  Service: Cardiology    JOINT REPLACEMENT  2010    right hip     No Known Allergies  Substance Abuse History:    Social History     Substance and Sexual Activity   Alcohol Use Not Currently     Social History     Substance and Sexual Activity   Drug Use Not Currently    Comment: medical marijuana      Social History:    Social History     Socioeconomic History    Marital status: /Civil Union     Spouse name: Not on file    Number of children: Not on file    Years of education: Not on file    Highest education level: Not on file   Occupational History    Not on file   Tobacco Use    Smoking status: Every Day     Current packs/day: 1.00     Types: Cigarettes    Smokeless tobacco: Never   Vaping Use    Vaping status: Never Used   Substance and Sexual Activity    Alcohol use: Not Currently    Drug use: Not Currently     Comment: medical marijuana    Sexual activity: Not Currently     Partners: Male   Other Topics Concern    Not on file   Social History Narrative    Not on file     Social Determinants of Health     Financial Resource Strain: Not on file   Food Insecurity: Not on file   Transportation Needs: Not on file   Physical Activity: Not on file   Stress: Not on file   Social Connections: Not on file   Intimate Partner Violence: Not on file   Housing Stability: Not on file     Family Psychiatric History:     Family History   Problem Relation Age of Onset    Depression Mother     Anxiety disorder Mother     Alcohol abuse Father     Depression Brother     Anxiety disorder Brother      History Review:The following portions of the patient's history were reviewed and updated as appropriate: allergies, current medications, past family history, past medical history, past social history, past surgical history and problem list     OBJECTIVE:     Vital signs in last 24 hours:    There were no vitals filed for this visit.  Laboratory Results:   Recent Labs (last 2 months):   Hospital Outpatient Visit on 12/12/2023   Component Date Value    Case Report 12/12/2023                      Value:Surgical Pathology Report                         Case: [id number]                                   Authorizing Provider:  Demi Verdugo MD           Collected:           12/12/2023 0846              Ordering Location:      [facility]       Received:            12/12/2023 1440                                     [facility]                                                             Pathologist:           Tam Wright DO                                                          Specimens:   A) - Polyp, Stomach/Small Intestine, duodenum polyp x 1                                             B) - Duodenum                                                                                       C) - Stomach                                                                                        D) - Polyp, Colorectal                                                                              E) - Colon                                                                                                                    F) - Polyp, Colorectal, hepatic flexture                                                            G) - Polyp, Colorectal, transverse x2                                                               H) - Polyp, Colorectal, descending                                                         Final Diagnosis 12/12/2023                      Value:This result contains rich text formatting which cannot be displayed here.    Additional Information 12/12/2023                      Value:This result contains rich text formatting which cannot be displayed here.    Synoptic Checklist 12/12/2023                      Value:                            COLON/RECTUM POLYP FORM - GI - H, G, F, D                                                                                     :    Adenoma(s)                                                         :    Other      Gross Description 12/12/2023                      Value:This result contains rich text formatting which cannot be displayed here.    Clinical Information 12/12/2023                      Value:Cold bx polypectomy     I have personally reviewed all pertinent laboratory/tests  results.    Suicide/Homicide Risk Assessment:    Risk of Harm to Self:  Protective Factors: no current suicidal ideation, access to mental health treatment, being , compliant with medications, compliant with mental health treatment, good self-esteem, having a desire to be alive  Based on today's assessment, Linette presents the following risk of harm to self: minimal    Risk of Harm to Others:  Based on today's assessment, Linette presents the following risk of harm to others: none    The following interventions are recommended: referral for psychotherapy    Medications Risks/Benefits:      Risks, Benefits And Possible Side Effects Of Medications:    Discussed risks and benefits of treatment with patient including risk of suicidality, serotonin syndrome, increased QTc interval and SIADH related to treatment with antidepressants; Risk of induction of manic symptoms in certain patient populations, risk of parkinsonian symptoms, metabolic syndrome, tardive dyskinesia and neuroleptic malignant syndrome related to treatment with antipsychotic medications and risks of misuse, abuse or dependence, sedation and respiratory depression related to treatment with benzodiazepine medications     Controlled Medication Discussion:     Linette has been filling controlled prescriptions on time as prescribed according to [place] Prescription Drug Monitoring Program  Discussed with Linette the risks of sedation, respiratory depression, impairment of ability to drive and potential for abuse and addiction related to treatment with benzodiazepine medications. She understands risk of treatment with benzodiazepine medications, agrees to not drive if feels impaired and agrees to take medications as prescribed  Discussed with Linette Black Box warning on concurrent use of benzodiazepines and opioid medications including sedation, respiratory depression, coma and death. She understands the risk of treatment with benzodiazepines in  addition to opioids and wants to continue taking those medications  Discussed with Linette increased risk of impairment related to concurrent use of benzodiazepines and hypnotic medications including excessive sedation, psychomotor impairment and respiratory depression. She understands the risk of treatment with benzodiazepines in addition to hypnotic medications and wants to continue taking those medications    Treatment Plan:    Due for update/Updated:   no  Last treatment plan done 8/28/23.  Treatment Plan due on 2/28/24.    WILL Oconnell 01/08/24    This note was shared with patient.      Visit Time    Visit Start Time: 130  Visit Stop Time: 150  Total Visit Duration:  20 minutes

## 2024-02-06 DIAGNOSIS — F41.1 GAD (GENERALIZED ANXIETY DISORDER): ICD-10-CM

## 2024-02-06 RX ORDER — CLONAZEPAM 1 MG/1
1 TABLET ORAL 3 TIMES DAILY
Qty: 90 TABLET | Refills: 0 | Status: SHIPPED | OUTPATIENT
Start: 2024-02-06 | End: 2024-03-07

## 2024-02-12 ENCOUNTER — TELEPHONE (OUTPATIENT)
Age: 64
End: 2024-02-12

## 2024-02-12 ENCOUNTER — TELEPHONE (OUTPATIENT)
Dept: PHYSICAL THERAPY | Facility: OTHER | Age: 64
End: 2024-02-12

## 2024-02-12 NOTE — TELEPHONE ENCOUNTER
Caller: Patient    Doctor: [facility]    Reason for call:     Transfer to [facility]    Call back#: n/a

## 2024-02-12 NOTE — TELEPHONE ENCOUNTER
Call transferred to comprehensive spine program by Ortho.    Spoke with patient, explained [facility] and what we offer. We normally start patient with advanced eval for PT.    Patient declined services. Patient had PT referrals last year and she declined as well.    Patient is interested in having an evaluation with an Orthopedic. Will provide direct phone number and transfer wanda back to Ortho.    No ref to close or defer.    Patient thanked me and I wished her a good day.

## 2024-02-19 ENCOUNTER — OFFICE VISIT (OUTPATIENT)
Dept: OBGYN CLINIC | Facility: CLINIC | Age: 64
End: 2024-02-19
Payer: COMMERCIAL

## 2024-02-19 VITALS
SYSTOLIC BLOOD PRESSURE: 114 MMHG | BODY MASS INDEX: 29.7 KG/M2 | WEIGHT: 196 LBS | HEART RATE: 84 BPM | DIASTOLIC BLOOD PRESSURE: 73 MMHG | HEIGHT: 68 IN

## 2024-02-19 DIAGNOSIS — M54.50 CHRONIC BILATERAL LOW BACK PAIN WITHOUT SCIATICA: Primary | ICD-10-CM

## 2024-02-19 DIAGNOSIS — M51.36 DEGENERATIVE DISC DISEASE, LUMBAR: ICD-10-CM

## 2024-02-19 DIAGNOSIS — M54.16 RADICULOPATHY, LUMBAR REGION: ICD-10-CM

## 2024-02-19 DIAGNOSIS — G89.29 CHRONIC BILATERAL LOW BACK PAIN WITHOUT SCIATICA: Primary | ICD-10-CM

## 2024-02-19 PROCEDURE — 99213 OFFICE O/P EST LOW 20 MIN: CPT | Performed by: ORTHOPAEDIC SURGERY

## 2024-02-19 NOTE — PROGRESS NOTES
[facility] ORTHOPEDIC SPINE SURGERY  DR.AMIR NUSRAT MD  [address] [place] [address]  [phone]    HISTORY OF PRESENT ILLNESS:    Linette Davila is a 63 y.o. female who presents for initial evaluation of low back pain. Patient reports back pain has been present for around 30 years. Patient reports pain is across the low back and constant in nature. Patient states pain has started to go down bilateral lower extremities ending at the ankle. Lower extremity pain occurs about twice a week. Patient reports she had an MRI in the past and was told she has spinal stenosis. Patient reports she did physical therapy for her back around five years ago. Patient denies any prior surgeries or injections in her back.       ALLERGIES: No Known Allergies    MEDICATIONS:    Current Outpatient Medications:     clonazePAM (KlonoPIN) 1 mg tablet, Take 1 tablet (1 mg total) by mouth 3 (three) times a day, Disp: 90 tablet, Rfl: 0    diclofenac (VOLTAREN) 75 mg EC tablet, Take 1 tablet (75 mg total) by mouth 2 (two) times a day, Disp: 60 tablet, Rfl: 1    meloxicam (Mobic) 15 mg tablet, Take 1 tablet (15 mg total) by mouth daily, Disp: 30 tablet, Rfl: 1    omeprazole (PriLOSEC OTC) 20 MG tablet, Take 20 mg by mouth daily Everyday, Disp: , Rfl:     omeprazole (PriLOSEC) 20 mg delayed release capsule, Take 1 capsule (20 mg total) by mouth 2 (two) times a day, Disp: 180 capsule, Rfl: 3    QUEtiapine (SEROquel) 100 mg tablet, Take 1 tablet (100 mg total) by mouth 3 (three) times a day, Disp: 90 tablet, Rfl: 0    sertraline (Zoloft) 100 mg tablet, Take 1 tablet (100 mg total) by mouth daily, Disp: 90 tablet, Rfl: 0     PAST MEDICAL HISTORY:   Past Medical History:   Diagnosis Date    Anxiety     SVT (supraventricular tachycardia)        PAST SURGICAL HISTORY:  Past Surgical History:   Procedure Laterality Date    CARDIAC ELECTROPHYSIOLOGY PROCEDURE N/A 05/30/2023    Procedure: Cardiac eps/svt ablation;  Surgeon: Jigar  MD Bhupinder;  Location[facility];  Service: Cardiology    JOINT REPLACEMENT  2010    right hip       SOCIAL HISTORY:  Social History     Tobacco Use   Smoking Status Every Day    Current packs/day: 1.00    Types: Cigarettes   Smokeless Tobacco Never          PHYSICAL EXAM:  63 y.o. female sitting comfortably on exam chair in no apparent distress.   Ambulates with normal gait  Able to go up on toes and heels  Able to balance on one leg  TTP over lumbar spine  5/5 motor strength with normal sensation in bilateral lower extremities  Absent deep tendon reflexes bilateral lower extremities       RADIOGRAPHIC STUDIES:  Xrays, lumbar spine, 8/09/2023: Moderate degenerative changes seen at L2-3 with severe degenerative disc disease noted at L3-4, L4-5, and L5-S1. There is a hint of retrolisthesis at L4-5. Mild degenerative scoliosis curvature seen.       ASSESSMENT:  1. Chronic bilateral low back pain without sciatica  -     Ambulatory Referral to Physical Therapy; Future    2. Degenerative disc disease, lumbar  -     Ambulatory Referral to Physical Therapy; Future    3. Radiculopathy, lumbar region  -     Ambulatory Referral to Physical Therapy; Future        PLAN:  63 y.o. female with chronic low back pain and lumbar DDD.     Xrays of lumbar spine were reviewed in the office today showing severe multilevel degenerative changes. It was discussed that patient will require an MRI of lumbar spine for further evaluation. However, in order to qualify for the MRI study patient must first complete at least six sessions of physical therapy to try and manage symptoms conservatively first. Script provided for physical therapy today. If physical therapy does not provide relief, there may be a role for injections depending on what the MRI shows. Patient was informed that if all conservative measures fail and surgery is indicated, then she will have to be off all nicotine products due to the negative effects nicotine ha on  surgical outcomes.     Patient will follow-up in 6 weeks for re-evaluation.        Scribe Attestation      I,:  Juliet Paulson PA-C am acting as a scribe while in the presence of the attending physician.:       I,:  Christophe Choudhary MD personally performed the services described in this documentation    as scribed in my presence.:

## 2024-03-04 ENCOUNTER — TELEMEDICINE (OUTPATIENT)
Dept: PSYCHIATRY | Facility: CLINIC | Age: 64
End: 2024-03-04
Payer: COMMERCIAL

## 2024-03-04 DIAGNOSIS — F41.1 GAD (GENERALIZED ANXIETY DISORDER): Primary | ICD-10-CM

## 2024-03-04 DIAGNOSIS — F33.1 MODERATE EPISODE OF RECURRENT MAJOR DEPRESSIVE DISORDER (HCC): ICD-10-CM

## 2024-03-04 PROCEDURE — 99214 OFFICE O/P EST MOD 30 MIN: CPT

## 2024-03-04 RX ORDER — ESCITALOPRAM OXALATE 10 MG/1
10 TABLET ORAL DAILY
Qty: 30 TABLET | Refills: 1 | Status: SHIPPED | OUTPATIENT
Start: 2024-03-04

## 2024-03-04 RX ORDER — CLONAZEPAM 1 MG/1
1 TABLET ORAL 3 TIMES DAILY
Qty: 90 TABLET | Refills: 0 | Status: SHIPPED | OUTPATIENT
Start: 2024-03-04 | End: 2024-04-03

## 2024-03-04 NOTE — PSYCH
Virtual Regular Visit    Verification of patient location:    Patient is located in the following state in which I hold an active license [place]    Problem List Items Addressed This Visit          Other    MDD (major depressive disorder), recurrent severe, without psychosis (HCC) (Chronic)    Relevant Medications    escitalopram (Lexapro) 10 mg tablet    JACOBY (generalized anxiety disorder) - Primary    Relevant Medications    escitalopram (Lexapro) 10 mg tablet    clonazePAM (KlonoPIN) 1 mg tablet          Encounter provider WILL Oconnell    Provider located at    [facility]  [address] [facility] [address] [phone]    Recent Visits  No visits were found meeting these conditions.  Showing recent visits within past 7 days and meeting all other requirements  Today's Visits  Date Type Provider Dept   03/04/24 Telemedicine WILL Oconnell  [facility]   Showing today's visits and meeting all other requirements  Future Appointments  No visits were found meeting these conditions.  Showing future appointments within next 150 days and meeting all other requirements         The patient was identified by name and date of birth. Linette Davila was informed that this is a telemedicine visit and that the visit is being conducted throughthe Epic Embedded platform. She agrees to proceed..  My office door was closed. No one else was in the room.  She acknowledged consent and understanding of privacy and security of the video platform. The patient has agreed to participate and understands they can discontinue the visit at any time.    Patient is aware this is a billable service.     HPI     Current Outpatient Medications   Medication Sig Dispense Refill    clonazePAM (KlonoPIN) 1 mg tablet Take 1 tablet (1 mg total) by mouth 3 (three) times a day 90 tablet 0    diclofenac (VOLTAREN) 75 mg EC tablet Take 1 tablet (75 mg total)  by mouth 2 (two) times a day 60 tablet 1    escitalopram (Lexapro) 10 mg tablet Take 1 tablet (10 mg total) by mouth daily 30 tablet 1    omeprazole (PriLOSEC OTC) 20 MG tablet Take 20 mg by mouth daily Everyday      omeprazole (PriLOSEC) 20 mg delayed release capsule Take 1 capsule (20 mg total) by mouth 2 (two) times a day 180 capsule 3    QUEtiapine (SEROquel) 100 mg tablet Take 1 tablet (100 mg total) by mouth 3 (three) times a day 90 tablet 0    meloxicam (Mobic) 15 mg tablet Take 1 tablet (15 mg total) by mouth daily (Patient not taking: Reported on 3/4/2024) 30 tablet 1     No current facility-administered medications for this visit.       Review of Systems  Video Exam    There were no vitals filed for this visit.    Physical Exam   As a result of this visit, I have referred the patient for further respiratory evaluation. No      VIRTUAL VISIT DISCLAIMER    Linette Davila acknowledges that she has consented to an online visit or consultation. She understands that the online visit is based solely on information provided by her, and that, in the absence of a face-to-face physical evaluation by the physician, the diagnosis she receives is both limited and provisional in terms of accuracy and completeness. This is not intended to replace a full medical face-to-face evaluation by the physician. Linette Davila understands and accepts these terms.    MEDICATION MANAGEMENT NOTE        [facility]    Name and Date of Birth:  Linette Davila 63 y.o. 1960 MRN: [id number]    Date of Visit: March 4, 2024    No Known Allergies  SUBJECTIVE:    Linette is seen today for a follow up for Major Depressive Disorder and Generalized Anxiety Disorder. She reports that she continues to experience on and off symptoms since the last visit.  Linette continues to endorse high levels of chronic anxiety-states she worries about everything including her health, 's  health, her horses, the future, unprovoked anxiety for no specific reason. Cannot pinpoint any specific stressor, the catastrophizing and anticipatory worry are most debilitating.  States she gets knots in her stomach on a daily basis with periods of nausea.  Symptoms are chronic, is attempting to use skills learned in partial last year including mindfulness, grounding techniques.  Continue to highly encourage psychotherapy as first-line treatment, remains on the wait list, she was given further resources for therapy today with goal to initiate soon as possible.  Depression 6/10 and often caused by chronic anxiety.  Anxiety improved when she is distracted and around her horses.  Identifies her  as strong support system.  Consider partial program if needed again. Reports Zoloft ineffective for chronic anxiety, will trial Lexapro 10 mg daily in replacement, patient agreed to plan. No further medication changes. Denies SI.      She denies any side effects from medications.    PLAN:     -Initiate Lexapro 10mg daily for chronic severe anxiety symptoms, Zoloft not helpful   -Decrease Zoloft to 50mg x7 days then d/c.      -Continue Seroquel 100 mg TID for anxiety, insomnia   -Klonopin 1 mg 3 times daily for severe anxiety and panic- on this medication for over 20 years, using appropriately, provider recommends decreasing usage and if unable to consider entering partial program again to increase coping skills   Discussed with patient the risks of sedation, respiratory depression, impairment of ability to drive and potential for abuse and addiction related to treatment with benzodiazepine medications. The patient understands risk of treatment with benzodiazepine medications, agrees to not drive if feels impaired and agrees to take medications as prescribed. Patient was also informed of risks of being on or starting opioid medications due to drug interactions and potential for serious respiratory depression and  death        Aware of 24 hour and weekend coverage for urgent situations accessed by calling [facility] main practice number  Referral for individual psychotherapy    Diagnoses and all orders for this visit:    JACOBY (generalized anxiety disorder)  -     escitalopram (Lexapro) 10 mg tablet; Take 1 tablet (10 mg total) by mouth daily  -     clonazePAM (KlonoPIN) 1 mg tablet; Take 1 tablet (1 mg total) by mouth 3 (three) times a day    Moderate episode of recurrent major depressive disorder (HCC)  -     escitalopram (Lexapro) 10 mg tablet; Take 1 tablet (10 mg total) by mouth daily        Current Outpatient Medications on File Prior to Visit   Medication Sig Dispense Refill    diclofenac (VOLTAREN) 75 mg EC tablet Take 1 tablet (75 mg total) by mouth 2 (two) times a day 60 tablet 1    omeprazole (PriLOSEC OTC) 20 MG tablet Take 20 mg by mouth daily Everyday      omeprazole (PriLOSEC) 20 mg delayed release capsule Take 1 capsule (20 mg total) by mouth 2 (two) times a day 180 capsule 3    QUEtiapine (SEROquel) 100 mg tablet Take 1 tablet (100 mg total) by mouth 3 (three) times a day 90 tablet 0    [DISCONTINUED] clonazePAM (KlonoPIN) 1 mg tablet Take 1 tablet (1 mg total) by mouth 3 (three) times a day 90 tablet 0    meloxicam (Mobic) 15 mg tablet Take 1 tablet (15 mg total) by mouth daily (Patient not taking: Reported on 3/4/2024) 30 tablet 1    [DISCONTINUED] sertraline (Zoloft) 100 mg tablet Take 1 tablet (100 mg total) by mouth daily 90 tablet 0     No current facility-administered medications on file prior to visit.       Psychotherapy Provided:       Supportive counseling provided.  Medication changes discussed with Linette.  Medication education provided to Linette.     HPI ROS Appetite Changes and Sleep:     She reports difficulty falling asleep, adequate appetite, low energy. Denies homicidal ideation, denies suicidal ideation    Review Of Systems:      HPI ROS:               Medication Side  Effects:  denies    Depression (10 worst): 6/10    Anxiety (10 worst): 7/10    Safety concerns (SI, HI, etc): Denies si and hi    Sleep: 6 hrs, better with seroquel    Energy: low    Appetite: 2 meals    Weight Change: denies        Mental Status Evaluation:    Appearance Adequate hygiene and grooming   Behavior calm and cooperative   Mood anxious and depressed  Depression Scale - 6 of 10 (0 = No depression)  Anxiety Scale - 7 of 10 (0 = No anxiety)   Speech Normal rate and volume   Affect constricted   Thought Processes Goal directed and coherent   Thought Content Does not verbalize delusional material   Associations Tightly connected   Perceptual Disturbances Denies hallucinations and does not appear to be responding to internal stimuli   Risk Potential Suicidal/Homicidal Ideation - No evidence of suicidal or homicidal ideation and patient does not verbalize suicidal or homicidal ideation  Risk of Violence - No evidence of risk for violence found on assessment  Risk of Self Mutilation - No evidence of risk for self mutilation found on assessment   Orientation oriented to person, place, time/date and situation   Memory recent and remote memory grossly intact   Consciousness alert and awake   Attention/Concentration attention span and concentration are age appropriate   Insight intact   Judgement intact   Muscle Strength and Gait normal muscle strength and normal muscle tone, normal gait/station and normal balance   Motor Activity no abnormal movements   Language no difficulty naming common objects, no difficulty repeating a phrase, no difficulty writing a sentence   Fund of Knowledge adequate knowledge of current events  adequate fund of knowledge regarding past history  adequate fund of knowledge regarding vocabulary          Past Medical History:    Past Medical History:   Diagnosis Date    Anxiety     SVT (supraventricular tachycardia)         Past Surgical History:   Procedure Laterality Date    CARDIAC  ELECTROPHYSIOLOGY PROCEDURE N/A 05/30/2023    Procedure: Cardiac eps/svt ablation;  Surgeon: Jigar Roe MD;  Location: [facility];  Service: Cardiology    JOINT REPLACEMENT  2010    right hip     No Known Allergies  Substance Abuse History:    Social History     Substance and Sexual Activity   Alcohol Use Not Currently     Social History     Substance and Sexual Activity   Drug Use Not Currently    Comment: medical marijuana     Social History:    Social History     Socioeconomic History    Marital status: /Civil Union     Spouse name: Not on file    Number of children: Not on file    Years of education: Not on file    Highest education level: Not on file   Occupational History    Not on file   Tobacco Use    Smoking status: Every Day     Current packs/day: 1.00     Types: Cigarettes    Smokeless tobacco: Never   Vaping Use    Vaping status: Never Used   Substance and Sexual Activity    Alcohol use: Not Currently    Drug use: Not Currently     Comment: medical marijuana    Sexual activity: Not Currently     Partners: Male   Other Topics Concern    Not on file   Social History Narrative    Not on file     Social Determinants of Health     Financial Resource Strain: Not on file   Food Insecurity: Not on file   Transportation Needs: Not on file   Physical Activity: Not on file   Stress: Not on file   Social Connections: Not on file   Intimate Partner Violence: Not on file   Housing Stability: Not on file     Family Psychiatric History:     Family History   Problem Relation Age of Onset    Depression Mother     Anxiety disorder Mother     Alcohol abuse Father     Depression Brother     Anxiety disorder Brother      History Review:The following portions of the patient's history were reviewed and updated as appropriate: allergies, current medications, past family history, past medical history, past social history, past surgical history and problem list     OBJECTIVE:     Vital signs in last 24  hours:    There were no vitals filed for this visit.  Laboratory Results:   Recent Labs (last 2 months):   No visits with results within 2 Month(s) from this visit.   Latest known visit with results is:   Hospital Outpatient Visit on 12/12/2023   Component Date Value    Case Report 12/12/2023                      Value:Surgical Pathology Report                         Case: [id number]                                   Authorizing Provider:  Demi Verdugo MD           Collected:           12/12/2023 0846              Ordering Location:      [facility]       Received:            12/12/2023 [address] [place] [address] Endoscopy                                                             Pathologist:           Tam Wright DO                                                          Specimens:   A) - Polyp, Stomach/Small Intestine, duodenum polyp x 1                                             B) - Duodenum                                                                                       C) - Stomach                                                                                        D) - Polyp, Colorectal                                                                              E) - Colon                                                                                                                    F) - Polyp, Colorectal, hepatic flexture                                                            G) - Polyp, Colorectal, transverse x2                                                               H) - Polyp, Colorectal, descending                                                         Final Diagnosis 12/12/2023                      Value:This result contains rich text formatting which cannot be displayed here.    Additional Information 12/12/2023                      Value:This result contains rich text formatting which cannot be displayed here.    Synoptic Checklist 12/12/2023                       Value:                            COLON/RECTUM POLYP FORM - GI - H, G, F, D                                                                                     :    Adenoma(s)                                                         :    Other      Gross Description 12/12/2023                      Value:This result contains rich text formatting which cannot be displayed here.    Clinical Information 12/12/2023                      Value:Cold bx polypectomy     I have personally reviewed all pertinent laboratory/tests results.    Suicide/Homicide Risk Assessment:    Risk of Harm to Self:  Protective Factors: no current suicidal ideation, access to mental health treatment, being , compliant with medications, compliant with mental health treatment, good self-esteem, having a desire to be alive  Based on today's assessment, Linette presents the following risk of harm to self: minimal    Risk of Harm to Others:  Based on today's assessment, Linette presents the following risk of harm to others: none    The following interventions are recommended: referral for psychotherapy    Medications Risks/Benefits:      Risks, Benefits And Possible Side Effects Of Medications:    Discussed risks and benefits of treatment with patient including risk of suicidality, serotonin syndrome, increased QTc interval and SIADH related to treatment with antidepressants; Risk of induction of manic symptoms in certain patient populations, risk of parkinsonian symptoms, metabolic syndrome, tardive dyskinesia and neuroleptic malignant syndrome related to treatment with antipsychotic medications and risks of misuse, abuse or dependence, sedation and respiratory depression related to treatment with benzodiazepine medications     Controlled Medication Discussion:     Linette has been filling controlled prescriptions on time as prescribed according to [place] Prescription Drug Monitoring Program  Discussed with Linette the  risks of sedation, respiratory depression, impairment of ability to drive and potential for abuse and addiction related to treatment with benzodiazepine medications. She understands risk of treatment with benzodiazepine medications, agrees to not drive if feels impaired and agrees to take medications as prescribed  Discussed with Linette [place] warning on concurrent use of benzodiazepines and opioid medications including sedation, respiratory depression, coma and death. She understands the risk of treatment with benzodiazepines in addition to opioids and wants to continue taking those medications  Discussed with Linette increased risk of impairment related to concurrent use of benzodiazepines and hypnotic medications including excessive sedation, psychomotor impairment and respiratory depression. She understands the risk of treatment with benzodiazepines in addition to hypnotic medications and wants to continue taking those medications    Treatment Plan:    Due for update/Updated:   no  Last treatment plan done 3/4, due 9/4/24    WILL Oconnell 03/04/24    This note was shared with patient.      Visit Time    Visit Start Time: 130  Visit Stop Time: 150  Total Visit Duration:  20 minutes

## 2024-03-04 NOTE — BH TREATMENT PLAN
TREATMENT PLAN (Medication Management Only)        [facility]    Name and Date of Birth:  Linette Davila 63 y.o. 1960  Date of Treatment Plan: March 4, 2024  Diagnosis/Diagnoses:    1. JACOBY (generalized anxiety disorder)    2. MDD (major depressive disorder), recurrent severe, without psychosis (HCC)      Strengths/Personal Resources for Self-Care: supportive family, taking medications as prescribed, ability to adapt to life changes, ability to communicate needs, ability to communicate well.  Area/Areas of need (in own words): anxiety symptoms  1. Long Term Goal: improve control of acceptable anxiety level.  Target Date:6 months - 9/4/2024  Person/Persons responsible for completion of goal: Linette  2.  Short Term Objective (s) - How will we reach this goal?:   A. Provider new recommended medication/dosage changes and/or continue medication(s): continue current medications as prescribed.  B. Attend medication management appointments regularly.  C. Take psychiatric medications responsibly.  Target Date:6 months - 9/4/2024  Person/Persons Responsible for Completion of Goal: Linette  Progress Towards Goals: continuing treatment  Treatment Modality: medication management every 3 months  Review due 180 days from date of this plan: 6 months - 9/4/2024  Expected length of service: ongoing treatment  My Physician/PA/NP and I have developed this plan together and I agree to work on the goals and objectives. I understand the treatment goals that were developed for my treatment.

## 2024-03-25 ENCOUNTER — OFFICE VISIT (OUTPATIENT)
Dept: GASTROENTEROLOGY | Facility: CLINIC | Age: 64
End: 2024-03-25
Payer: COMMERCIAL

## 2024-03-25 VITALS
WEIGHT: 192 LBS | TEMPERATURE: 98.5 F | DIASTOLIC BLOOD PRESSURE: 84 MMHG | OXYGEN SATURATION: 98 % | BODY MASS INDEX: 29.1 KG/M2 | HEART RATE: 92 BPM | HEIGHT: 68 IN | RESPIRATION RATE: 18 BRPM | SYSTOLIC BLOOD PRESSURE: 122 MMHG

## 2024-03-25 DIAGNOSIS — R10.9 ABDOMINAL CRAMPING: ICD-10-CM

## 2024-03-25 DIAGNOSIS — K21.9 GASTROESOPHAGEAL REFLUX DISEASE WITHOUT ESOPHAGITIS: ICD-10-CM

## 2024-03-25 DIAGNOSIS — R68.81 EARLY SATIETY: Primary | ICD-10-CM

## 2024-03-25 PROCEDURE — 99214 OFFICE O/P EST MOD 30 MIN: CPT | Performed by: INTERNAL MEDICINE

## 2024-03-25 RX ORDER — OMEPRAZOLE 40 MG/1
40 CAPSULE, DELAYED RELEASE ORAL 2 TIMES DAILY
Qty: 180 CAPSULE | Refills: 3 | Status: SHIPPED | OUTPATIENT
Start: 2024-03-25 | End: 2025-03-20

## 2024-03-25 RX ORDER — DICYCLOMINE HYDROCHLORIDE 10 MG/1
10 CAPSULE ORAL
Qty: 120 CAPSULE | Refills: 0 | Status: SHIPPED | OUTPATIENT
Start: 2024-03-25

## 2024-03-25 NOTE — PROGRESS NOTES
[facility] - Outpatient Note  Linette Davila 63 y.o. female MRN: [id number]  Encounter: [id number]      ASSESSMENT AND PLAN:    Linette Davila is a 63 y.o. old pleasant female with PMH of anxiety, smoking, depression who presents for followup    Irritable bowel syndrome, abdominal cramping, early satiety-suspect functional disorder as her symptoms are strongly related to her anxiety.  Recent EGD and colonoscopy unremarkable aside from small type I hiatal hernia, single submucosal duodenal lesion and 5 subcentimeter colon polyps.  Biopsies negative for H. pylori, celiac disease and microscopic colitis.  Begin low fodmap  Consider dairy free  Increase Omeprazole 40mg BID  Decrease coffee intake  Check GES  Start Bentyl as needed  Consider SIBO testing in the future  Consider probiotic   Consider rifaximin  Consider Elavil in the future    History of colon polyps-last colonoscopy 2023 with multiple subcentimeter TA's  Repeat colonoscopy 2026    Submucosal lesion duodenum-15 mm lesion in third part of duodenum with questionable pillow sign seen on recent EGD.  Biopsies nondiagnostic  Plan for EUS          There are no diagnoses linked to this encounter.  ______________________________________________________________________    SUBJECTIVE:      Abd burning, daily, sometime worse with meals a/w nasuea sometimes radiating to LUQ x 6months    Nausea    Early sateity    4 cups of coffee per day        Diarrhea 4-5x loose BM per week x 6months    GERD    Answers submitted by the patient for this visit:  Abdominal Pain Questionnaire (Submitted on 3/22/2024)  Chief Complaint: Abdominal pain  Chronicity: chronic  Onset: more than 1 month ago  Onset quality: gradual  Frequency: 2 to 4 times per day  Episode duration: 5 Days  Progression since onset: waxing and waning  Pain location: LLQ, RLQ  Pain - numeric: 7/10  Pain quality: aching, burning  Radiates to: LUQ, RUQ  anorexia: Yes  belching:  "Yes  constipation: No  diarrhea: Yes  dysuria: No  fever: No  flatus: No  frequency: No  headaches: Yes  hematochezia: No  hematuria: No  melena: No  myalgias: Yes  nausea: Yes  weight loss: No  vomiting: Yes  Aggravated by: eating  Relieved by: recumbency  Diagnostic workup: lower endoscopy, upper endoscopy      I reviewed prior external notes    I reviewed previous lab results and images      REVIEW OF SYSTEMS:     REVIEW OF ALL OTHER SYSTEMS IS OTHERWISE NEGATIVE.      Historical Information   Past Medical History:   Diagnosis Date    Anxiety     SVT (supraventricular tachycardia)      Past Surgical History:   Procedure Laterality Date    CARDIAC ELECTROPHYSIOLOGY PROCEDURE N/A 05/30/2023    Procedure: Cardiac eps/svt ablation;  Surgeon: Jigar Roe MD;  [facility];  Service: Cardiology    JOINT REPLACEMENT  2010    right hip     Social History   Social History     Substance and Sexual Activity   Alcohol Use Not Currently     Social History     Substance and Sexual Activity   Drug Use Not Currently    Comment: medical marijuana     Social History     Tobacco Use   Smoking Status Every Day    Current packs/day: 1.00    Types: Cigarettes   Smokeless Tobacco Never     Family History   Problem Relation Age of Onset    Depression Mother     Anxiety disorder Mother     Alcohol abuse Father     Depression Brother     Anxiety disorder Brother        Meds/Allergies       Current Outpatient Medications:     clonazePAM (KlonoPIN) 1 mg tablet    diclofenac (VOLTAREN) 75 mg EC tablet    escitalopram (Lexapro) 10 mg tablet    omeprazole (PriLOSEC OTC) 20 MG tablet    omeprazole (PriLOSEC) 20 mg delayed release capsule    QUEtiapine (SEROquel) 100 mg tablet    meloxicam (Mobic) 15 mg tablet    No Known Allergies        Objective     Blood pressure 122/84, pulse 92, temperature 98.5 °F (36.9 °C), temperature source Tympanic, resp. rate 18, height 5' 8\" (1.727 m), weight 87.1 kg (192 lb), SpO2 98%. Body " mass index is 29.19 kg/m².      PHYSICAL EXAM:      General Appearance:   Alert, cooperative, no distress   HEENT:   Normocephalic, atraumatic, anicteric.     Neck:  Supple, symmetrical, trachea midline   Lungs:   Clear to auscultation bilaterally; no rales, rhonchi or wheezing; respirations unlabored    Heart::   Regular rate and rhythm; no murmur, rub, or gallop.   Abdomen:   Soft, non-tender, non-distended; normal bowel sounds; no masses, no organomegaly    Genitalia:   Deferred    Rectal:   Deferred    Extremities:  No cyanosis, clubbing or edema    Pulses:  2+ and symmetric    Skin:  No jaundice, rashes, or lesions    Lymph nodes:  No palpable cervical lymphadenopathy        Lab Results:   No visits with results within 1 Day(s) from this visit.   Latest known visit with results is:   Hospital Outpatient Visit on 12/12/2023   Component Date Value    Case Report 12/12/2023                      Value:Surgical Pathology Report                         Case: [id number]                                   Authorizing Provider:  Demi Verdugo MD           Collected:           12/12/2023 0846              Ordering Location:      [facility]       Received:            12/12/2023 [address] [place] [address] Endoscopy                                                             Pathologist:           Tam Wright DO                                                          Specimens:   A) - Polyp, Stomach/Small Intestine, duodenum polyp x 1                                             B) - Duodenum                                                                                       C) - Stomach                                                                                        D) - Polyp, Colorectal                                                                              E) - Colon                                                                                                                     F) - Polyp, Colorectal, hepatic flexture                                                            G) - Polyp, Colorectal, transverse x2                                                               H) - Polyp, Colorectal, descending                                                         Final Diagnosis 12/12/2023                      Value:This result contains rich text formatting which cannot be displayed here.    Additional Information 12/12/2023                      Value:This result contains rich text formatting which cannot be displayed here.    Synoptic Checklist 12/12/2023                      Value:                            COLON/RECTUM POLYP FORM - GI - H, G, F, D                                                                                     :    Adenoma(s)                                                         :    Other      Gross Description 12/12/2023                      Value:This result contains rich text formatting which cannot be displayed here.    Clinical Information 12/12/2023                      Value:Cold bx polypectomy         Radiology Results:   No results found.    Answers submitted by the patient for this visit:  Abdominal Pain Questionnaire (Submitted on 3/22/2024)  Chief Complaint: Abdominal pain  Chronicity: chronic  Onset: more than 1 month ago  Onset quality: gradual  Frequency: 2 to 4 times per day  Episode duration: 5 Days  Progression since onset: waxing and waning  Pain location: LLQ, RLQ  Pain - numeric: 7/10  Pain quality: aching, burning  Radiates to: LUQ, RUQ  anorexia: Yes  belching: Yes  constipation: No  diarrhea: Yes  dysuria: No  fever: No  flatus: No  frequency: No  headaches: Yes  hematochezia: No  hematuria: No  melena: No  myalgias: Yes  nausea: Yes  weight loss: No  vomiting: Yes  Aggravated by: eating  Relieved by: recumbency  Diagnostic workup: lower endoscopy, upper endoscopy

## 2024-03-29 ENCOUNTER — TELEPHONE (OUTPATIENT)
Dept: GASTROENTEROLOGY | Facility: CLINIC | Age: 64
End: 2024-03-29

## 2024-03-29 NOTE — TELEPHONE ENCOUNTER
----- Message from Marjan Ernst PA-C sent at 3/26/2024  2:10 PM EDT -----  Case D/W Dr. Valencia. Agree with non-urgent EUS. Thank you!  ----- Message -----  From: Demi Verdugo MD  Sent: 3/25/2024  12:52 PM EDT  To: [facility]    Hi can we set patient up for nonurgent EUS for duodenal submucosal lesion in the next 3 months?  Thank you.

## 2024-04-04 DIAGNOSIS — F41.1 GAD (GENERALIZED ANXIETY DISORDER): ICD-10-CM

## 2024-04-04 RX ORDER — CLONAZEPAM 1 MG/1
1 TABLET ORAL 3 TIMES DAILY
Qty: 90 TABLET | Refills: 0 | Status: SHIPPED | OUTPATIENT
Start: 2024-04-04 | End: 2024-05-04

## 2024-04-26 ENCOUNTER — TELEPHONE (OUTPATIENT)
Age: 64
End: 2024-04-26

## 2024-04-26 NOTE — TELEPHONE ENCOUNTER
Patient called, requested to schedule NP appt for 'urethral brown discharge' / possible mix of blood in urine (potential UTI). Pt denies fever or any other symptoms including pressure, urinary urgency/ frequency, cramping, etc. URO CTS directed pt to GYN for concern. Reviewed all URG NP appts. Pt prefers [facility], [place] has soonest availability but not within 48 hrs. Encouraged pt to contact PCP; pt stated their PCP would refer back to obgyn. Suggested Urgent Care, pt advised they've experienced symptom for a few months & they do not consider urgent. Pt agreeable to 4/30 appt in [place].

## 2024-04-26 NOTE — TELEPHONE ENCOUNTER
Pt calling to schedule NP appt due to brown discharge. Confirmed with Triage nurse to call PCP or GYN     Transferred patient to OBGYN.

## 2024-04-30 ENCOUNTER — OFFICE VISIT (OUTPATIENT)
Dept: OBGYN CLINIC | Facility: CLINIC | Age: 64
End: 2024-04-30
Payer: COMMERCIAL

## 2024-04-30 VITALS — SYSTOLIC BLOOD PRESSURE: 104 MMHG | DIASTOLIC BLOOD PRESSURE: 74 MMHG | BODY MASS INDEX: 30.47 KG/M2 | WEIGHT: 200.4 LBS

## 2024-04-30 DIAGNOSIS — R39.9 UTI SYMPTOMS: ICD-10-CM

## 2024-04-30 DIAGNOSIS — N95.0 POSTMENOPAUSAL BLEEDING: Primary | ICD-10-CM

## 2024-04-30 LAB
SL AMB  POCT GLUCOSE, UA: ABNORMAL
SL AMB LEUKOCYTE ESTERASE,UA: ABNORMAL
SL AMB POCT BILIRUBIN,UA: ABNORMAL
SL AMB POCT BLOOD,UA: ABNORMAL
SL AMB POCT CLARITY,UA: CLEAR
SL AMB POCT COLOR,UA: ABNORMAL
SL AMB POCT KETONES,UA: ABNORMAL
SL AMB POCT NITRITE,UA: ABNORMAL
SL AMB POCT PH,UA: NORMAL
SL AMB POCT SPECIFIC GRAVITY,UA: NORMAL
SL AMB POCT URINE PROTEIN: ABNORMAL
SL AMB POCT UROBILINOGEN: ABNORMAL

## 2024-04-30 PROCEDURE — G0145 SCR C/V CYTO,THINLAYER,RESCR: HCPCS | Performed by: OBSTETRICS & GYNECOLOGY

## 2024-04-30 PROCEDURE — G0476 HPV COMBO ASSAY CA SCREEN: HCPCS | Performed by: OBSTETRICS & GYNECOLOGY

## 2024-04-30 PROCEDURE — 81002 URINALYSIS NONAUTO W/O SCOPE: CPT | Performed by: OBSTETRICS & GYNECOLOGY

## 2024-04-30 PROCEDURE — 87086 URINE CULTURE/COLONY COUNT: CPT | Performed by: OBSTETRICS & GYNECOLOGY

## 2024-04-30 PROCEDURE — 99203 OFFICE O/P NEW LOW 30 MIN: CPT | Performed by: OBSTETRICS & GYNECOLOGY

## 2024-04-30 NOTE — PROGRESS NOTES
Assessment/Plan:       Problem List Items Addressed This Visit       Postmenopausal bleeding - Primary     Pt with what sounds like PMB- noting brown and mucous discharge on underwear  Exam today within normal limits- no obvious fresh or old blood present within vagina. Atrophic vagina and cervix noted  Pap smear collected today as part of workup. Check pelvic u/s as well.   Discussed likely needing EMB vs D&C in OR as part of work up. She tolerated speculum exam well today, but was significantly uncomfortable for her - likely to need premedication for in office attempt  Will f/u results of ultrasound         Relevant Orders    US pelvis complete w transvaginal    Liquid-based pap, screening    UTI symptoms     UA today with +leuk esterase , no blood on dip  Will check urine culture         Relevant Orders    Urine culture    POCT urine dip (Completed)    Liquid-based pap, screening         Subjective:      Patient ID: Linette Davila is a 64 y.o. female who presents today with brown urethral discharge    HPI    Linette Davila is a 64 y.o.  who presents today with brown discharge/bleeding.     Initially thought from urethra due to where the blood and mucous was on her underwear  Called urology and they referred her to see gynecology.   Started a few months ago, thinks first 2 months ago.  Notices blood/mucous in her underwear every day.    Noticed with douching the last month it was very painful and stinging.     Last GYN visit more than 10 years ago.   Menopause at age 59  Denies any pmb until 2 months ago.     Denies any gyn surgeries.   Never pregnant  Last time SA was about 8 years ago  Denies hx abnormal pap smears.     She denies any urinary symptoms- dysuria, odor. Denies ever seeing blood in urine/toilet when urinating.   She does have a urologist.     Past Medical History:   Diagnosis Date    Anxiety     Depression     SVT (supraventricular tachycardia)     Varicella      Past  Surgical History:   Procedure Laterality Date    CARDIAC ELECTROPHYSIOLOGY PROCEDURE N/A 05/30/2023    Procedure: Cardiac eps/svt ablation;  Surgeon: Jigar Roe MD;  [facility];  Service: Cardiology    JOINT REPLACEMENT  2010    right hip       The following portions of the patient's history were reviewed and updated as appropriate: allergies, current medications, past family history, past medical history, past social history, past surgical history, and problem list.    Review of Systems    Negative unless otherwise noted in HPI    Objective:      /74 (BP Location: Left arm, Patient Position: Sitting, Cuff Size: Large)   Wt 90.9 kg (200 lb 6.4 oz)   BMI 30.47 kg/m²        Physical Exam  Constitutional:       General: She is not in acute distress.  HENT:      Head: Normocephalic and atraumatic.      Mouth/Throat:      Mouth: Mucous membranes are moist.   Cardiovascular:      Rate and Rhythm: Normal rate.   Pulmonary:      Effort: Pulmonary effort is normal. No respiratory distress.   Abdominal:      General: There is no distension.      Palpations: Abdomen is soft.   Genitourinary:         Comments: Normal external genitalia, normal appearing vaginal mucosa- atrophic  Atropic appearing cervix  Uterus small, mobile anteverted. Nontender  Adnexa nontender to palpation  Skin:     General: Skin is warm and dry.   Neurological:      Mental Status: She is alert.   Psychiatric:         Mood and Affect: Mood normal.         Behavior: Behavior normal.

## 2024-04-30 NOTE — ASSESSMENT & PLAN NOTE
Pt with what sounds like PMB- noting brown and mucous discharge on underwear  Exam today within normal limits- no obvious fresh or old blood present within vagina. Atrophic vagina and cervix noted  Pap smear collected today as part of workup. Check pelvic u/s as well.   Discussed likely needing EMB vs D&C in OR as part of work up. She tolerated speculum exam well today, but was significantly uncomfortable for her - likely to need premedication for in office attempt  Will f/u results of ultrasound

## 2024-05-01 LAB
BACTERIA UR CULT: NORMAL
HPV HR 12 DNA CVX QL NAA+PROBE: NEGATIVE
HPV16 DNA CVX QL NAA+PROBE: NEGATIVE
HPV18 DNA CVX QL NAA+PROBE: NEGATIVE

## 2024-05-06 DIAGNOSIS — F41.1 GAD (GENERALIZED ANXIETY DISORDER): ICD-10-CM

## 2024-05-06 RX ORDER — CLONAZEPAM 1 MG/1
1 TABLET ORAL 3 TIMES DAILY
Qty: 90 TABLET | Refills: 0 | Status: SHIPPED | OUTPATIENT
Start: 2024-05-06 | End: 2024-06-05

## 2024-05-06 NOTE — PSYCH
Visit Time    Visit Start Time: 0930  Visit Stop Time: 9733  Total Visit Duration: 60 minutes    Subjective:     Patient ID: Reji Snell is a 59 y.o. y.o. female. [facility] Clinical Progress Notes      Specialized Services Documentation  Therapist must complete separate progress note for each specific clinical activity in which the individual participated during the day. This group was facilitated virtually in a private office using HIPAA Compliant and Approved [employer] Teams. Reji Snell consented to the use of tele-video modality of treatment. Allied Therapy  Reji Snell actively shared in music therapy group focused on DBT mindfulness strategies “what” skills. Group tasks and discussions explored practice of “what” skills through observing, describing and participating. Group engaged in therapist led PMR. Alexa identified they could practice mindfulness today through being out in nature. Some positive effort noted toward treatment goal.  Continue AT to encourage the development and practice of mindfulness strategies to alleviate symptoms and support wellness.    Tx Plan Objective: 1.1, Therapist:  Jameel [facility]

## 2024-05-08 LAB
LAB AP GYN PRIMARY INTERPRETATION: NORMAL
Lab: NORMAL

## 2024-05-25 ENCOUNTER — HOSPITAL ENCOUNTER (OUTPATIENT)
Dept: ULTRASOUND IMAGING | Facility: HOSPITAL | Age: 64
Discharge: HOME | End: 2024-05-25
Attending: OBSTETRICS & GYNECOLOGY
Payer: COMMERCIAL

## 2024-05-25 DIAGNOSIS — N95.0 POSTMENOPAUSAL BLEEDING: ICD-10-CM

## 2024-05-25 PROCEDURE — 76830 TRANSVAGINAL US NON-OB: CPT

## 2024-05-25 PROCEDURE — 76856 US EXAM PELVIC COMPLETE: CPT

## 2024-05-28 DIAGNOSIS — F33.2 MDD (MAJOR DEPRESSIVE DISORDER), RECURRENT SEVERE, WITHOUT PSYCHOSIS (HCC): ICD-10-CM

## 2024-05-28 RX ORDER — QUETIAPINE FUMARATE 100 MG/1
100 TABLET, FILM COATED ORAL 3 TIMES DAILY
Qty: 90 TABLET | Refills: 0 | Status: SHIPPED | OUTPATIENT
Start: 2024-05-28

## 2024-06-06 ENCOUNTER — TELEMEDICINE (OUTPATIENT)
Dept: PSYCHIATRY | Facility: CLINIC | Age: 64
End: 2024-06-06
Payer: COMMERCIAL

## 2024-06-06 DIAGNOSIS — F33.2 MDD (MAJOR DEPRESSIVE DISORDER), RECURRENT SEVERE, WITHOUT PSYCHOSIS (HCC): Primary | ICD-10-CM

## 2024-06-06 DIAGNOSIS — F41.1 GAD (GENERALIZED ANXIETY DISORDER): ICD-10-CM

## 2024-06-06 PROCEDURE — 99214 OFFICE O/P EST MOD 30 MIN: CPT

## 2024-06-06 RX ORDER — SERTRALINE HYDROCHLORIDE 100 MG/1
100 TABLET, FILM COATED ORAL DAILY
Qty: 90 TABLET | Refills: 0 | Status: SHIPPED | OUTPATIENT
Start: 2024-06-06

## 2024-06-06 RX ORDER — CLONAZEPAM 1 MG/1
1 TABLET ORAL 3 TIMES DAILY
Qty: 90 TABLET | Refills: 0 | Status: SHIPPED | OUTPATIENT
Start: 2024-06-06 | End: 2024-07-06

## 2024-06-06 NOTE — PSYCH
Virtual Regular Visit    Verification of patient location:    Patient is located in the following state in which I hold an active license [place]    Problem List Items Addressed This Visit          Behavioral Health    MDD (major depressive disorder), recurrent severe, without psychosis (HCC) - Primary (Chronic)    JACOBY (generalized anxiety disorder)            Encounter provider WILL Oconnell    Provider located at    [facility]  [address] [facility] [address] [phone]    Recent Visits  No visits were found meeting these conditions.  Showing recent visits within past 7 days and meeting all other requirements  Today's Visits  Date Type Provider Dept   06/06/24 Telemedicine WILL Oconnell  [facility]   Showing today's visits and meeting all other requirements  Future Appointments  No visits were found meeting these conditions.  Showing future appointments within next 150 days and meeting all other requirements         The patient was identified by name and date of birth. Linette Davila was informed that this is a telemedicine visit and that the visit is being conducted throughthe Epic Embedded platform. She agrees to proceed..  My office door was closed. No one else was in the room.  She acknowledged consent and understanding of privacy and security of the video platform. The patient has agreed to participate and understands they can discontinue the visit at any time.    Patient is aware this is a billable service.     HPI     Current Outpatient Medications   Medication Sig Dispense Refill    clonazePAM (KlonoPIN) 1 mg tablet Take 1 tablet (1 mg total) by mouth 3 (three) times a day 90 tablet 0    diclofenac (VOLTAREN) 75 mg EC tablet Take 1 tablet (75 mg total) by mouth 2 (two) times a day 60 tablet 1    dicyclomine (BENTYL) 10 mg capsule Take 1 capsule (10 mg total) by mouth 4 (four) times a day (before  meals and at bedtime) 120 capsule 0    omeprazole (PriLOSEC) 40 MG capsule Take 1 capsule (40 mg total) by mouth 2 (two) times a day 180 capsule 3    QUEtiapine (SEROquel) 100 mg tablet Take 1 tablet (100 mg total) by mouth 3 (three) times a day 90 tablet 0     No current facility-administered medications for this visit.       Review of Systems  Video Exam    There were no vitals filed for this visit.    Physical Exam   As a result of this visit, I have referred the patient for further respiratory evaluation. No      VIRTUAL VISIT DISCLAIMER    Linette Davila acknowledges that she has consented to an online visit or consultation. She understands that the online visit is based solely on information provided by her, and that, in the absence of a face-to-face physical evaluation by the physician, the diagnosis she receives is both limited and provisional in terms of accuracy and completeness. This is not intended to replace a full medical face-to-face evaluation by the physician. Linette Davila understands and accepts these terms.    MEDICATION MANAGEMENT NOTE        [facility]    Name and Date of Birth:  Linette Davila 64 y.o. 1960 MRN: [id number]    Date of Visit: June 6, 2024    No Known Allergies  SUBJECTIVE:    Linette is seen today for a follow up for Major Depressive Disorder and Generalized Anxiety Disorder. She reports that she continues to experience on and off symptoms since the last visit.  Linette reports continued severe chronic anxiety, states she gets anxious about any little thing in life no matter how big the stressor.  States her  believes it is in her head and she needs to brush it off which upsets patient, supportive counseling provided.  Attempted to identify triggers for her anxiety but patient struggled, attempted to discuss coping skills learned in partial last year, patient is attempting to use but struggles with  grounding techniques.  Offered patient readmission to the partial program to help with severe anxiety, patient declines at this time but will consider.  Continues to use Klonopin 1 mg 3 times daily for severe unprovoked anxiety, using appropriately with no adverse effects.  Would like to taper medication but patient unable to at this time due to severe anxiety and biweekly panic attacks.  Discussed the importance of psychotherapy, states she continues to look into psychotherapy, has failed with multiple therapist in the past due to not being the right fit for her, she is trying to find a better fit this time.  At previous appointment, initiated Lexapro in replacement of Zoloft in attempts to improve anxiety relief, patient reports feeling sick on Lexapro and she returned to her normal dose of Zoloft 100 mg daily.  Continue Seroquel 100 mg daily for anxiety, denies adverse effects.  No medication changes made today.  Depression 4/10 and reports it is caused by her anxiety.  Consider partial.  Has been mostly supportive.  Denies SI.        She denies any side effects from medications.    PLAN:     -Continue Zoloft 100mg daily   PARQ completed including serotonin syndrome, SIADH, worsening depression, suicidality, induction of flaquito, GI upset, headaches, activation, sexual side effects, sedation, potential drug interactions, and others.      -Continue Seroquel 100 mg TID for anxiety, insomnia   -Klonopin 1 mg 3 times daily for severe anxiety and panic- on this medication for over 20 years, using appropriately, provider recommends decreasing usage and if unable to consider entering partial program again to increase coping skills   Discussed with patient the risks of sedation, respiratory depression, impairment of ability to drive and potential for abuse and addiction related to treatment with benzodiazepine medications. The patient understands risk of treatment with benzodiazepine medications, agrees to not drive if  feels impaired and agrees to take medications as prescribed. Patient was also informed of risks of being on or starting opioid medications due to drug interactions and potential for serious respiratory depression and death        Aware of 24 hour and weekend coverage for urgent situations accessed by calling [facility] main practice number  Referral for individual psychotherapy    Diagnoses and all orders for this visit:    MDD (major depressive disorder), recurrent severe, without psychosis (HCC)    JACOBY (generalized anxiety disorder)          Current Outpatient Medications on File Prior to Visit   Medication Sig Dispense Refill    clonazePAM (KlonoPIN) 1 mg tablet Take 1 tablet (1 mg total) by mouth 3 (three) times a day 90 tablet 0    diclofenac (VOLTAREN) 75 mg EC tablet Take 1 tablet (75 mg total) by mouth 2 (two) times a day 60 tablet 1    dicyclomine (BENTYL) 10 mg capsule Take 1 capsule (10 mg total) by mouth 4 (four) times a day (before meals and at bedtime) 120 capsule 0    omeprazole (PriLOSEC) 40 MG capsule Take 1 capsule (40 mg total) by mouth 2 (two) times a day 180 capsule 3    QUEtiapine (SEROquel) 100 mg tablet Take 1 tablet (100 mg total) by mouth 3 (three) times a day 90 tablet 0     No current facility-administered medications on file prior to visit.       Psychotherapy Provided:       Supportive counseling provided.  Medication changes discussed with Linette.  Medication education provided to Linette.     HPI ROS Appetite Changes and Sleep:     She reports difficulty falling asleep, adequate appetite, low energy. Denies homicidal ideation, denies suicidal ideation    Review Of Systems:      HPI ROS:               Medication Side Effects:  denies    Depression (10 worst): 4/10    Anxiety (10 worst): 8/10    Safety concerns (SI, HI, etc): Denies si and hi    Sleep: 6 hrs, better with seroquel    Energy: low    Appetite: 2 meals    Weight Change: denies        Mental Status  Evaluation:    Appearance Adequate hygiene and grooming   Behavior calm and cooperative   Mood anxious and depressed  Depression Scale - 4 of 10 (0 = No depression)  Anxiety Scale - 8 of 10 (0 = No anxiety)   Speech Normal rate and volume   Affect constricted   Thought Processes Goal directed and coherent   Thought Content Does not verbalize delusional material   Associations Tightly connected   Perceptual Disturbances Denies hallucinations and does not appear to be responding to internal stimuli   Risk Potential Suicidal/Homicidal Ideation - No evidence of suicidal or homicidal ideation and patient does not verbalize suicidal or homicidal ideation  Risk of Violence - No evidence of risk for violence found on assessment  Risk of Self Mutilation - No evidence of risk for self mutilation found on assessment   Orientation oriented to person, place, time/date and situation   Memory recent and remote memory grossly intact   Consciousness alert and awake   Attention/Concentration attention span and concentration are age appropriate   Insight intact   Judgement intact   Muscle Strength and Gait normal muscle strength and normal muscle tone, normal gait/station and normal balance   Motor Activity no abnormal movements   Language no difficulty naming common objects, no difficulty repeating a phrase, no difficulty writing a sentence   Fund of Knowledge adequate knowledge of current events  adequate fund of knowledge regarding past history  adequate fund of knowledge regarding vocabulary          Past Medical History:    Past Medical History:   Diagnosis Date    Anxiety     Depression 1987    SVT (supraventricular tachycardia)     Varicella 1987        Past Surgical History:   Procedure Laterality Date    CARDIAC ELECTROPHYSIOLOGY PROCEDURE N/A 05/30/2023    Procedure: Cardiac eps/svt ablation;  Surgeon: Jigar Roe MD;  Location: [facility];  Service: Cardiology    JOINT REPLACEMENT  2010    right hip     No  Known Allergies  Substance Abuse History:    Social History     Substance and Sexual Activity   Alcohol Use Not Currently     Social History     Substance and Sexual Activity   Drug Use Not Currently    Comment: medical marijuana     Social History:    Social History     Socioeconomic History    Marital status: /Civil Union     Spouse name: Not on file    Number of children: Not on file    Years of education: Not on file    Highest education level: Not on file   Occupational History    Not on file   Tobacco Use    Smoking status: Every Day     Current packs/day: 1.00     Average packs/day: 1 pack/day for 34.0 years (34.0 ttl pk-yrs)     Types: Cigarettes     Start date: 5/23/1990    Smokeless tobacco: Never   Vaping Use    Vaping status: Never Used   Substance and Sexual Activity    Alcohol use: Not Currently    Drug use: Not Currently     Comment: medical marijuana    Sexual activity: Not Currently     Partners: Male     Birth control/protection: Abstinence, Post-menopausal   Other Topics Concern    Not on file   Social History Narrative    Not on file     Social Determinants of Health     Financial Resource Strain: Not on file   Food Insecurity: Not on file   Transportation Needs: Not on file   Physical Activity: Not on file   Stress: Not on file   Social Connections: Not on file   Intimate Partner Violence: Not on file   Housing Stability: Not on file     Family Psychiatric History:     Family History   Problem Relation Age of Onset    Depression Mother     Anxiety disorder Mother     Alcohol abuse Father     Depression Brother     Anxiety disorder Brother     Colon cancer Maternal Grandmother      History Review:The following portions of the patient's history were reviewed and updated as appropriate: allergies, current medications, past family history, past medical history, past social history, past surgical history and problem list     OBJECTIVE:     Vital signs in last 24 hours:    There were no vitals  filed for this visit.  Laboratory Results:   Recent Labs (last 2 months):   Office Visit on 04/30/2024   Component Date Value    Urine Culture 04/30/2024 No Growth <1000 cfu/mL     LEUKOCYTE ESTERASE,UA 04/30/2024 ++     NITRITE,UA 04/30/2024 neg     SL AMB POCT UROBILINOGEN 04/30/2024 neg     POCT URINE PROTEIN 04/30/2024 neg      PH,UA 04/30/2024 normal     BLOOD,UA 04/30/2024 neg     SPECIFIC GRAVITY,UA 04/30/2024 normal     KETONES,UA 04/30/2024 neg     BILIRUBIN,UA 04/30/2024 neg     GLUCOSE, UA 04/30/2024 neg      COLOR,UA 04/30/2024 light yellow     CLARITY,UA 04/30/2024 clear     Case Report 04/30/2024                      Value:Gynecologic Cytology Report                       Case: [id number]                                  Authorizing Provider:  Etelvina Dover DO         Collected:           04/30/2024 1405              Ordering Location:     [facility]      Received:            04/30/2024 [facility]                                     [facility]                                                                               [place]                                                                    First Screen:          Cornelio Oliva                                                                 Specimen:    LIQUID-BASED PAP, SCREENING, Cervix                                                        Primary Interpretation 04/30/2024 Negative for intraepithelial lesion or malignancy     Specimen Adequacy 04/30/2024 Satisfactory for evaluation. Endocervical/transformation zone component present.     Additional Information 04/30/2024                      Value:This result contains rich text formatting which cannot be displayed here.    Gross Description 04/30/2024                      Value:This result contains rich text formatting which cannot be displayed here.    HPV Other HR 04/30/2024 Negative     HPV16 04/30/2024 Negative     HPV18 04/30/2024 Negative      I have personally reviewed  all pertinent laboratory/tests results.    Suicide/Homicide Risk Assessment:    Risk of Harm to Self:  Protective Factors: no current suicidal ideation, access to mental health treatment, being , compliant with medications, compliant with mental health treatment, good self-esteem, having a desire to be alive  Based on today's assessment, Linette presents the following risk of harm to self: minimal    Risk of Harm to Others:  Based on today's assessment, Linette presents the following risk of harm to others: none    The following interventions are recommended: referral for psychotherapy    Medications Risks/Benefits:      Risks, Benefits And Possible Side Effects Of Medications:    Discussed risks and benefits of treatment with patient including risk of suicidality, serotonin syndrome, increased QTc interval and SIADH related to treatment with antidepressants; Risk of induction of manic symptoms in certain patient populations, risk of parkinsonian symptoms, metabolic syndrome, tardive dyskinesia and neuroleptic malignant syndrome related to treatment with antipsychotic medications and risks of misuse, abuse or dependence, sedation and respiratory depression related to treatment with benzodiazepine medications     Controlled Medication Discussion:     Linette has been filling controlled prescriptions on time as prescribed according to [place] Prescription Drug Monitoring Program  Discussed with Linette the risks of sedation, respiratory depression, impairment of ability to drive and potential for abuse and addiction related to treatment with benzodiazepine medications. She understands risk of treatment with benzodiazepine medications, agrees to not drive if feels impaired and agrees to take medications as prescribed  Discussed with Linette Black Box warning on concurrent use of benzodiazepines and opioid medications including sedation, respiratory depression, coma and death. She understands the risk of  treatment with benzodiazepines in addition to opioids and wants to continue taking those medications  Discussed with Linette increased risk of impairment related to concurrent use of benzodiazepines and hypnotic medications including excessive sedation, psychomotor impairment and respiratory depression. She understands the risk of treatment with benzodiazepines in addition to hypnotic medications and wants to continue taking those medications    Treatment Plan:    Due for update/Updated:   no  Last treatment plan done 3/4, due 9/4/24    WILL Oconnell 06/06/24    This note was shared with patient.      Visit Time    Visit Start Time: 1  Visit Stop Time: 125  Total Visit Duration:  25 minutes

## 2024-07-05 DIAGNOSIS — F41.1 GAD (GENERALIZED ANXIETY DISORDER): ICD-10-CM

## 2024-07-05 RX ORDER — CLONAZEPAM 1 MG/1
1 TABLET ORAL 3 TIMES DAILY
Qty: 90 TABLET | Refills: 0 | Status: SHIPPED | OUTPATIENT
Start: 2024-07-05 | End: 2024-08-04

## 2024-07-10 DIAGNOSIS — F33.2 MDD (MAJOR DEPRESSIVE DISORDER), RECURRENT SEVERE, WITHOUT PSYCHOSIS (HCC): ICD-10-CM

## 2024-07-11 RX ORDER — QUETIAPINE FUMARATE 100 MG/1
100 TABLET, FILM COATED ORAL 3 TIMES DAILY
Qty: 90 TABLET | Refills: 0 | Status: SHIPPED | OUTPATIENT
Start: 2024-07-11

## 2024-07-12 DIAGNOSIS — R10.9 ABDOMINAL CRAMPING: ICD-10-CM

## 2024-07-12 DIAGNOSIS — K21.9 GASTROESOPHAGEAL REFLUX DISEASE WITHOUT ESOPHAGITIS: ICD-10-CM

## 2024-07-12 RX ORDER — DICYCLOMINE HYDROCHLORIDE 10 MG/1
10 CAPSULE ORAL
Qty: 120 CAPSULE | Refills: 5 | Status: SHIPPED | OUTPATIENT
Start: 2024-07-12

## 2024-07-12 RX ORDER — OMEPRAZOLE 40 MG/1
40 CAPSULE, DELAYED RELEASE ORAL 2 TIMES DAILY
Qty: 200 CAPSULE | Refills: 1 | Status: SHIPPED | OUTPATIENT
Start: 2024-07-12 | End: 2025-07-07

## 2024-07-17 ENCOUNTER — TELEPHONE (OUTPATIENT)
Dept: GASTROENTEROLOGY | Facility: CLINIC | Age: 64
End: 2024-07-17

## 2024-07-17 NOTE — TELEPHONE ENCOUNTER
Approved    Prior authorization approved  Payer: [employer] Case ID: [id number]    [phone]    [phone]  Note from payer: CaseId:[id number];Status:Approved;Review Type:Qty;Coverage Start Date:07/17/2024;Coverage End Date:07/17/2025;  Approval Details    Authorized from July 17, 2024 to July 17, 2025  Electronic appeal: Not supported  View History  Medication Being Authorized    omeprazole (PriLOSEC) 40 MG capsule  Take 1 capsule (40 mg total) by mouth 2 (two) times a day  Dispense: 200 capsule Refills: 1   Start: 7/12/2024 End: 7/7/2025   Class: Normal Diagnoses: Gastroesophageal reflux disease without esophagitis   This order has been released to its destination.  [id number] [place] [place] [facility] [address] [place] [address] for omeprazole Approved     Date(s) approved July 17, 2024 to July 17, 2025    Case #[id number]    Patient advised by          [x] [employer] Customer Supportt Message  [] Phone call   []LMOM  []L/M to call office as no active Communication consent on file  []Unable to leave detailed message as VM not approved on Communication consent       Pharmacy advised by    [x]Fax  []Phone call    Approval letter NOT scanned into Media No letter at time of approval

## 2024-07-17 NOTE — TELEPHONE ENCOUNTER
[place] omerpazole    Submitted via    []CMM-KEY   [x]Lashay-Case ID #   [id number]    Payer: [employer]   Phone: [phone]   Fax: [phone]     []Faxed to plan   []Other website   []Phone call Case ID #     Office notes sent, clinical questions answered. Awaiting determination    Turnaround time for your insurance to make a decision on your Prior Authorization can take 7-21 business days.

## 2024-07-24 ENCOUNTER — TELEPHONE (OUTPATIENT)
Dept: GASTROENTEROLOGY | Facility: CLINIC | Age: 64
End: 2024-07-24

## 2024-07-26 ENCOUNTER — TELEPHONE (OUTPATIENT)
Dept: PAIN MEDICINE | Facility: CLINIC | Age: 64
End: 2024-07-26

## 2024-07-26 NOTE — PROGRESS NOTES
Assessment:  1. Chronic bilateral low back pain with bilateral sciatica    2. Lumbar facet arthropathy        Plan:  Orders Placed This Encounter   Procedures    FL spine and pain procedure     Standing Status:   Future     Standing Expiration Date:   7/29/2028     Order Specific Question:   Reason for Exam:     Answer:   B/L L4-5 and L5-S1 MBB #1     Order Specific Question:   Anticoagulant hold needed?     Answer:   No       No orders of the defined types were placed in this encounter.      My impressions and treatment recommendations were discussed in detail with the patient, who verbalized understanding and had no further questions.    61-year-old female presents her office chief complaint of bilateral lower back pain.  Symptoms appear to be mostly axial in nature.  She is neurologically intact on exam today.  There is considerable pain with lumbar extension and facet loading bilaterally.  She has advanced degenerative disease on imaging as well with degenerative disc issues at the L4-5 and L5-S1 level as well as facet arthropathy.  We discussed bilateral L4-5 and L5-S1 medial branch block for diagnostic purposes followed by radiofrequency ablation with 80% relief.    Additionally, we are providing patient home exercise program to be performed for period of at least 6 weeks.  If no relief with injection treatments and HEP, then may consider updated imaging of the lumbar spine in the form of  MRI.      [place] Prescription Drug Monitoring Program report was reviewed and was appropriate     Complete risks and benefits including bleeding, infection, tissue reaction, nerve injury and allergic reaction were discussed. The approach was demonstrated using models and literature was provided. Verbal and written consent was obtained.    Discharge instructions were provided. I personally saw and examined the patient and I agree with the above discussed plan of care.    History of Present Illness:    Linette  Lola is a 64 y.o. female who presents to [facility] for initial evaluation of the above stated pain complaints. The patient has a past medical and chronic pain history as outlined in the assessment section. She was referred by Referral Self  No address on file .    Chief complaint of lower back pain for 1-1/2 years.  Undetermined cause.  Moderate to severe over the past 1.  Has had for constant.  Worse in the morning.  Burning, numbness, sharp, throbbing in nature.    Pain is bilateral low back with some numbness in the anterior thighs and lower legs. Numbness for last 2 months. Prior to that issues have been mainly in the back.     Pain is increased with standing, bending, sitting, walking.  Decreased with lying down.    X-ray of the lumbar spine shows advanced multilevel degenerative disc disease in the lower lumbar spine.  There is facet arthropathy.    She did see Dr. Choudhary in February 2024.  Was given prescription for physical therapy.  Reports no relief with psychotherapy or TENS unit.  Moderate leaf with exercise.    Patient is a smoker.    Review of Systems:    Review of Systems   Constitutional:  Positive for unexpected weight change.   Gastrointestinal:  Positive for abdominal pain.   Musculoskeletal:  Positive for back pain.   Psychiatric/Behavioral:  Positive for decreased concentration. The patient is nervous/anxious.         Depression            Past Medical History:   Diagnosis Date    Anxiety     Depression 1987    SVT (supraventricular tachycardia)     Varicella 1987       Past Surgical History:   Procedure Laterality Date    CARDIAC ELECTROPHYSIOLOGY PROCEDURE N/A 05/30/2023    Procedure: Cardiac eps/svt ablation;  Surgeon: Jigar Roe MD;  [facility];  Service: Cardiology    JOINT REPLACEMENT  2010    right hip       Family History   Problem Relation Age of Onset    Depression Mother     Anxiety disorder Mother     Alcohol abuse Father   "   Depression Brother     Anxiety disorder Brother     Colon cancer Maternal Grandmother        Social History     Occupational History    Not on file   Tobacco Use    Smoking status: Every Day     Current packs/day: 1.00     Average packs/day: 1 pack/day for 34.2 years (34.2 ttl pk-yrs)     Types: Cigarettes     Start date: 5/23/1990    Smokeless tobacco: Never   Vaping Use    Vaping status: Never Used   Substance and Sexual Activity    Alcohol use: Not Currently    Drug use: Not Currently     Comment: medical marijuana    Sexual activity: Not Currently     Partners: Male     Birth control/protection: Abstinence, Post-menopausal         Current Outpatient Medications:     clonazePAM (KlonoPIN) 1 mg tablet, Take 1 tablet (1 mg total) by mouth 3 (three) times a day, Disp: 90 tablet, Rfl: 0    diclofenac (VOLTAREN) 75 mg EC tablet, Take 1 tablet (75 mg total) by mouth 2 (two) times a day, Disp: 60 tablet, Rfl: 1    dicyclomine (BENTYL) 10 mg capsule, Take 1 capsule (10 mg total) by mouth 4 (four) times a day (before meals and at bedtime), Disp: 120 capsule, Rfl: 5    omeprazole (PriLOSEC) 40 MG capsule, Take 1 capsule (40 mg total) by mouth 2 (two) times a day, Disp: 200 capsule, Rfl: 1    QUEtiapine (SEROquel) 100 mg tablet, Take 1 tablet (100 mg total) by mouth 3 (three) times a day, Disp: 90 tablet, Rfl: 0    sertraline (ZOLOFT) 100 mg tablet, Take 1 tablet (100 mg total) by mouth daily, Disp: 90 tablet, Rfl: 0    No Known Allergies    Physical Exam:    /75   Pulse 77   Ht 5' 8\" (1.727 m)   Wt 86.2 kg (190 lb)   BMI 28.89 kg/m²     Constitutional: normal, well developed, well nourished, alert, in no distress and non-toxic and no overt pain behavior.  Eyes: anicteric  HEENT: grossly intact  Neck: supple, symmetric, trachea midline and no masses   Pulmonary:even and unlabored  Cardiovascular:No edema or pitting edema present  Skin:Normal without rashes or lesions and well hydrated  Psychiatric:Mood and " affect appropriate  Neurologic:Cranial Nerves II-XII grossly intact  Musculoskeletal:normal    Lumbar Spine Exam    Appearance:  Normal lordosis  Palpation/Tenderness:  TTP bilateral lumbar paraspinal musculature  Sensory:  no sensory deficits noted  Range of Motion:  Not pain with lumbar extension bilateral rotation  Motor Strength:  Left hip flexion:  5/5  Left hip extension:  5/5  Right hip flexion:  5/5  Right hip extension:  5/5  Left knee flexion:  5/5  Left knee extension:  5/5  Right knee flexion:  5/5  Right knee extension:  5/5  Left foot dorsiflexion:  5/5  Left foot plantar flexion:  5/5  Right foot dorsiflexion:  5/5  Right foot plantar flexion:  5/5  Reflexes:  Left Patellar:  2+   Right Patellar:  2+   Left Achilles:  2+   Right Achilles:  2+   Special Tests:  Left Straight Leg Test:  negative  Right Straight Leg Test:  negative  Facet loading positive bilaterally      Imaging    LUMBAR SPINE  8/9/23     INDICATION:   M47.816: Spondylosis without myelopathy or radiculopathy, lumbar region  M51.36: Other intervertebral disc degeneration, lumbar region.     COMPARISON:  None     VIEWS:  XR SPINE LUMBAR MINIMUM 4 VIEWS NON INJURY        FINDINGS:     There are 5 non rib bearing lumbar vertebral bodies.     There is no evidence of acute fracture or destructive osseous lesion.     Alignment is unremarkable.     Moderate multilevel  disc space narrowing, subchondral sclerosis, and endplate spondylosis, consistent with degenerative disc disease. Moderate lower lumbar facet arthropathy.        The pedicles appear intact.     Soft tissues are unremarkable.     IMPRESSION:     No acute osseous abnormality.     Degenerative changes as described.     FL spine and pain procedure    (Results Pending)       Orders Placed This Encounter   Procedures    FL spine and pain procedure

## 2024-07-26 NOTE — H&P (VIEW-ONLY)
Assessment:  1. Chronic bilateral low back pain with bilateral sciatica    2. Lumbar facet arthropathy        Plan:  Orders Placed This Encounter   Procedures    FL spine and pain procedure     Standing Status:   Future     Standing Expiration Date:   7/29/2028     Order Specific Question:   Reason for Exam:     Answer:   B/L L4-5 and L5-S1 MBB #1     Order Specific Question:   Anticoagulant hold needed?     Answer:   No       No orders of the defined types were placed in this encounter.      My impressions and treatment recommendations were discussed in detail with the patient, who verbalized understanding and had no further questions.    61-year-old female presents her office chief complaint of bilateral lower back pain.  Symptoms appear to be mostly axial in nature.  She is neurologically intact on exam today.  There is considerable pain with lumbar extension and facet loading bilaterally.  She has advanced degenerative disease on imaging as well with degenerative disc issues at the L4-5 and L5-S1 level as well as facet arthropathy.  We discussed bilateral L4-5 and L5-S1 medial branch block for diagnostic purposes followed by radiofrequency ablation with 80% relief.    Additionally, we are providing patient home exercise program to be performed for period of at least 6 weeks.  If no relief with injection treatments and HEP, then may consider updated imaging of the lumbar spine in the form of  MRI.      [place] Prescription Drug Monitoring Program report was reviewed and was appropriate     Complete risks and benefits including bleeding, infection, tissue reaction, nerve injury and allergic reaction were discussed. The approach was demonstrated using models and literature was provided. Verbal and written consent was obtained.    Discharge instructions were provided. I personally saw and examined the patient and I agree with the above discussed plan of care.    History of Present Illness:    Linette  Lola is a 64 y.o. female who presents to [facility] for initial evaluation of the above stated pain complaints. The patient has a past medical and chronic pain history as outlined in the assessment section. She was referred by Referral Self  No address on file .    Chief complaint of lower back pain for 1-1/2 years.  Undetermined cause.  Moderate to severe over the past 1.  Has had for constant.  Worse in the morning.  Burning, numbness, sharp, throbbing in nature.    Pain is bilateral low back with some numbness in the anterior thighs and lower legs. Numbness for last 2 months. Prior to that issues have been mainly in the back.     Pain is increased with standing, bending, sitting, walking.  Decreased with lying down.    X-ray of the lumbar spine shows advanced multilevel degenerative disc disease in the lower lumbar spine.  There is facet arthropathy.    She did see Dr. Choudhary in February 2024.  Was given prescription for physical therapy.  Reports no relief with psychotherapy or TENS unit.  Moderate leaf with exercise.    Patient is a smoker.    Review of Systems:    Review of Systems   Constitutional:  Positive for unexpected weight change.   Gastrointestinal:  Positive for abdominal pain.   Musculoskeletal:  Positive for back pain.   Psychiatric/Behavioral:  Positive for decreased concentration. The patient is nervous/anxious.         Depression            Past Medical History:   Diagnosis Date    Anxiety     Depression 1987    SVT (supraventricular tachycardia)     Varicella 1987       Past Surgical History:   Procedure Laterality Date    CARDIAC ELECTROPHYSIOLOGY PROCEDURE N/A 05/30/2023    Procedure: Cardiac eps/svt ablation;  Surgeon: Jigar Roe MD;  Location: [facility];  Service: Cardiology    JOINT REPLACEMENT  2010    right hip       Family History   Problem Relation Age of Onset    Depression Mother     Anxiety disorder Mother     Alcohol abuse Father   "   Depression Brother     Anxiety disorder Brother     Colon cancer Maternal Grandmother        Social History     Occupational History    Not on file   Tobacco Use    Smoking status: Every Day     Current packs/day: 1.00     Average packs/day: 1 pack/day for 34.2 years (34.2 ttl pk-yrs)     Types: Cigarettes     Start date: 5/23/1990    Smokeless tobacco: Never   Vaping Use    Vaping status: Never Used   Substance and Sexual Activity    Alcohol use: Not Currently    Drug use: Not Currently     Comment: medical marijuana    Sexual activity: Not Currently     Partners: Male     Birth control/protection: Abstinence, Post-menopausal         Current Outpatient Medications:     clonazePAM (KlonoPIN) 1 mg tablet, Take 1 tablet (1 mg total) by mouth 3 (three) times a day, Disp: 90 tablet, Rfl: 0    diclofenac (VOLTAREN) 75 mg EC tablet, Take 1 tablet (75 mg total) by mouth 2 (two) times a day, Disp: 60 tablet, Rfl: 1    dicyclomine (BENTYL) 10 mg capsule, Take 1 capsule (10 mg total) by mouth 4 (four) times a day (before meals and at bedtime), Disp: 120 capsule, Rfl: 5    omeprazole (PriLOSEC) 40 MG capsule, Take 1 capsule (40 mg total) by mouth 2 (two) times a day, Disp: 200 capsule, Rfl: 1    QUEtiapine (SEROquel) 100 mg tablet, Take 1 tablet (100 mg total) by mouth 3 (three) times a day, Disp: 90 tablet, Rfl: 0    sertraline (ZOLOFT) 100 mg tablet, Take 1 tablet (100 mg total) by mouth daily, Disp: 90 tablet, Rfl: 0    No Known Allergies    Physical Exam:    /75   Pulse 77   Ht 5' 8\" (1.727 m)   Wt 86.2 kg (190 lb)   BMI 28.89 kg/m²     Constitutional: normal, well developed, well nourished, alert, in no distress and non-toxic and no overt pain behavior.  Eyes: anicteric  HEENT: grossly intact  Neck: supple, symmetric, trachea midline and no masses   Pulmonary:even and unlabored  Cardiovascular:No edema or pitting edema present  Skin:Normal without rashes or lesions and well hydrated  Psychiatric:Mood and " affect appropriate  Neurologic:Cranial Nerves II-XII grossly intact  Musculoskeletal:normal    Lumbar Spine Exam    Appearance:  Normal lordosis  Palpation/Tenderness:  TTP bilateral lumbar paraspinal musculature  Sensory:  no sensory deficits noted  Range of Motion:  Not pain with lumbar extension bilateral rotation  Motor Strength:  Left hip flexion:  5/5  Left hip extension:  5/5  Right hip flexion:  5/5  Right hip extension:  5/5  Left knee flexion:  5/5  Left knee extension:  5/5  Right knee flexion:  5/5  Right knee extension:  5/5  Left foot dorsiflexion:  5/5  Left foot plantar flexion:  5/5  Right foot dorsiflexion:  5/5  Right foot plantar flexion:  5/5  Reflexes:  Left Patellar:  2+   Right Patellar:  2+   Left Achilles:  2+   Right Achilles:  2+   Special Tests:  Left Straight Leg Test:  negative  Right Straight Leg Test:  negative  Facet loading positive bilaterally      Imaging    LUMBAR SPINE  8/9/23     INDICATION:   M47.816: Spondylosis without myelopathy or radiculopathy, lumbar region  M51.36: Other intervertebral disc degeneration, lumbar region.     COMPARISON:  None     VIEWS:  XR SPINE LUMBAR MINIMUM 4 VIEWS NON INJURY        FINDINGS:     There are 5 non rib bearing lumbar vertebral bodies.     There is no evidence of acute fracture or destructive osseous lesion.     Alignment is unremarkable.     Moderate multilevel  disc space narrowing, subchondral sclerosis, and endplate spondylosis, consistent with degenerative disc disease. Moderate lower lumbar facet arthropathy.        The pedicles appear intact.     Soft tissues are unremarkable.     IMPRESSION:     No acute osseous abnormality.     Degenerative changes as described.     FL spine and pain procedure    (Results Pending)       Orders Placed This Encounter   Procedures    FL spine and pain procedure

## 2024-07-29 ENCOUNTER — OFFICE VISIT (OUTPATIENT)
Dept: PAIN MEDICINE | Facility: CLINIC | Age: 64
End: 2024-07-29
Payer: COMMERCIAL

## 2024-07-29 VITALS
HEART RATE: 77 BPM | HEIGHT: 68 IN | SYSTOLIC BLOOD PRESSURE: 119 MMHG | BODY MASS INDEX: 28.79 KG/M2 | DIASTOLIC BLOOD PRESSURE: 75 MMHG | WEIGHT: 190 LBS

## 2024-07-29 DIAGNOSIS — M47.816 LUMBAR FACET ARTHROPATHY: ICD-10-CM

## 2024-07-29 DIAGNOSIS — M54.42 CHRONIC BILATERAL LOW BACK PAIN WITH BILATERAL SCIATICA: Primary | ICD-10-CM

## 2024-07-29 DIAGNOSIS — G89.29 CHRONIC BILATERAL LOW BACK PAIN WITH BILATERAL SCIATICA: Primary | ICD-10-CM

## 2024-07-29 DIAGNOSIS — M54.41 CHRONIC BILATERAL LOW BACK PAIN WITH BILATERAL SCIATICA: Primary | ICD-10-CM

## 2024-07-29 PROCEDURE — 99204 OFFICE O/P NEW MOD 45 MIN: CPT | Performed by: STUDENT IN AN ORGANIZED HEALTH CARE EDUCATION/TRAINING PROGRAM

## 2024-07-29 NOTE — PATIENT INSTRUCTIONS
"Patient Education     Back exercises   The Basics   Written by the doctors and editors at [facility]   Why do I need to do back exercises? -- Back exercises can help ease back pain and might help prevent future back pain. Long term, it is important to strengthen the muscles in your lower back, buttocks, and belly. These are your \"core muscles.\" Stretching exercises are also important to keep your muscles flexible.  Below are some stretching and strengthening exercises that might help you. Other forms of movement can help ease or prevent back pain, too. For example, some people like to walk, do aerobic exercise, or do yoga or katie chi. The most important thing is to move your body. Your doctor, nurse, or physical therapist can help you find different types of activity that work for you.  What stretching exercises should I do? -- Below are some examples of stretching exercises. Warm up your muscles before stretching to help prevent injury. To warm up, you can walk, jog, or cycle for a few minutes.  Start by repeating each of these stretches 2 to 3 times. Try to hold each stretch for 5 to 10 seconds, and try to do the stretches 2 to 3 times each day. Breathe slowly and deeply as you do the exercises. Never bounce when doing stretches.   Cat-cow stretch (figure 1) - Start on all fours on the floor, with your hands under your shoulders, knees under your hips, and your back flat. First, tuck your chin and tighten your stomach muscles as you round your back (like a \"cat\"). Hold the stretch for about 10 seconds. Rest for a few seconds as you flatten your back. Next, lift your chin and let your belly and lower back sink toward the floor (like a \"cow\"). Hold the stretch for about 10 seconds.   Single knee-to-chest stretches (figure 2) ? While lying on your back, bend your knees with your feet flat on the floor. Pull 1 knee toward your chest until you feel a stretch in your lower back and buttock area. Lower, and repeat with the " other knee. If you have knee problems, pull your knee up by grabbing the back of your thigh instead of the front of your knee. You can also do this exercise by grabbing both knees at the same time.   Lower trunk rotations (figure 3) ? While lying on your back, bend your knees with your feet flat on the floor. Keep your knees and ankles together, and then drop them to 1 side. Keep both of your shoulders touching the floor until you feel a stretch in the muscles at the side of the back. Repeat on the other side.   Lower back stretches seated (figure 4) ? Sit in a chair with your feet spread about shoulder width apart. Then, lean forward until you feel a stretch in your lower back.  What strengthening exercises should I do? -- Below are some examples of strengthening exercises.  Start by doing each exercise 2 to 3 times. Work up to doing each exercise 10 times. Hold each exercise for 3 to 5 seconds, and try to do the exercises 2 to 3 times each day. Do all exercises slowly.   Shoulder blade squeezes (figure 5) ? Pinch your shoulder blades together on your upper back, and hold 3 to 5 seconds. You can also do these 1 side at a time. Sit with good posture, and make sure that your shoulders do not rise up when you do this exercise. Relax.   Pelvic tilts (figure 6) ? Lie on your back with your knees bent and feet flat on the floor. Tighten your stomach muscles, and press your lower back down to the floor. Relax. You should be able to breathe comfortably during this exercise.   Hip lifts (figure 7) ? Lie on your back with your knees bent and feet flat on the floor. Tighten your stomach muscles, keep your back flat, and lift your buttocks off of the floor. Relax. You should feel this in your buttocks, not in your lower back.  What else should I know?    Exercise, including stretching, might be slightly uncomfortable. But you should not have sharp or severe pain. If you do get severe pain, stop what you are doing. If severe  "pain continues, call your doctor or nurse.   Do not hold your breath when exercising. If you tend to hold your breath, try counting out loud when exercising. If any exercise bothers you, stop right away.   Always warm up before exercising. Warm muscles stretch much easier than cool muscles. Stretching cool muscles can lead to injury.   Doing exercises before a meal can be a good way to get into a routine.  All topics are updated as new evidence becomes available and our peer review process is complete.  This topic retrieved from [employer] on: Apr 03, 2024.  Topic 658881 Version 2.0  Release: 32.2.4 - C32.92  © 2024 UpToDate, Inc. and/or its affiliates. All rights reserved.  figure 1: Cat-cow stretch     Start on all fours on the floor, with hands under your shoulders, knees under your hips, and your back flat. First, tuck your chin and tighten your stomach muscles as you round your back (like a \"cat\"). Hold the stretch for about 10 seconds. Rest for a few seconds as you flatten your back. Next, lift your chin and let your belly and lower back sink toward the floor (like a \"cow\"). Hold the stretch for about 10 seconds.  Graphic [id number] Version 1.0  figure 2: Single knee-to-chest stretch     Lie on your back, bend your knees, and have your feet flat on the floor. Pull 1 knee toward your chest until you feel a stretch in your lower back and buttock area. Repeat with the other knee. If you have knee problems, pull your knee up by grabbing the back of your thigh instead of the front of your knee. You can also do this exercise by grabbing both knees at the same time.  Graphic [id number] Version 1.0  figure 3: Lower trunk rotation     While lying on your back, bend your knees and have your feet flat on the floor. Keep your legs together and then drop them to 1 side. Keep both of your shoulders touching the floor until you feel a stretch in the muscles at the side of the back. Repeat on the other side.  Graphic [id number] Version " 1.0  figure 4: Lower back stretch     Sit in a chair with your feet spread about shoulder width apart. Then, lean forward until you feel a stretch in your lower back.  Graphic [id number] Version 1.0  figure 5: Shoulder blade squeezes     Pinch your shoulder blades together on your upper back and hold for 3 to 5 seconds. Make sure that you are sitting with good posture and that your shoulders do not raise up when you do this exercise. Relax.  Graphic [id number] Version 1.0  figure 6: Pelvic tilts     Lie on your back with your knees bent and feet flat on the floor. Tighten your stomach muscles and press your lower back down to the floor. Relax.  Graphic [id number] Version 1.0  figure 7: Hip lifts     Lie on your back with your knees bent and feet flat on the floor. Tighten your stomach muscles and lift your buttocks off of the floor. Relax.  Graphic [id number] Version 1.0  Consumer Information Use and Disclaimer   Disclaimer: This generalized information is a limited summary of diagnosis, treatment, and/or medication information. It is not meant to be comprehensive and should be used as a tool to help the user understand and/or assess potential diagnostic and treatment options. It does NOT include all information about conditions, treatments, medications, side effects, or risks that may apply to a specific patient. It is not intended to be medical advice or a substitute for the medical advice, diagnosis, or treatment of a health care provider based on the health care provider's examination and assessment of a patient's specific and unique circumstances. Patients must speak with a health care provider for complete information about their health, medical questions, and treatment options, including any risks or benefits regarding use of medications. This information does not endorse any treatments or medications as safe, effective, or approved for treating a specific patient. UpToDate, Inc. and its affiliates disclaim any warranty or  liability relating to this information or the use thereof.The use of this information is governed by the Terms of Use, available at https://www.wolterskluwer.com/en/know/clinical-effectiveness-terms. 2024© UpToDate, Inc. and its affiliates and/or licensors. All rights reserved.  Copyright   © 2024 [employer] and/or its affiliates. All rights reserved.

## 2024-07-30 ENCOUNTER — TELEPHONE (OUTPATIENT)
Age: 64
End: 2024-07-30

## 2024-07-30 NOTE — TELEPHONE ENCOUNTER
Called and LVM regarding talk therapy wait list- to see if services are still needed and to schedule. 1st call attempt.

## 2024-07-31 ENCOUNTER — PATIENT MESSAGE (OUTPATIENT)
Dept: RADIOLOGY | Facility: CLINIC | Age: 64
End: 2024-07-31

## 2024-07-31 NOTE — PATIENT COMMUNICATION
Pt is scheduled for MBB with Dr Vides on 8/27/24    Pt is not diabetic     Pt given instructions in office and via myc message    Have you completed PT/HEP/Chiro in the past 6 months for dedicated area? HEP assigned at appt on 7/29/24  If yes, how long did you complete?  What was the frequency?  Did it provide relief? TBD  If no, reason therapy was not completed? Per Dr Vides's note on 7/29/24, pt has also tried TENS unit without relief

## 2024-08-05 DIAGNOSIS — F41.1 GAD (GENERALIZED ANXIETY DISORDER): ICD-10-CM

## 2024-08-05 NOTE — TELEPHONE ENCOUNTER
"Behavioral Health Outpatient Intake Questions    Referred By   :     Please advise interviewee that they need to answer all questions truthfully to allow for best care, and any misrepresentations of information may affect their ability to be seen at this clinic   => Was this discussed? Yes     If Minor Child (under age 18)    Who is/are the legal guardian(s) of the child?     Is there a custody agreement? No     If \"YES\"- Custody orders must be obtained prior to scheduling the first appointment  In addition, Consent to Treatment must be signed by all legal guardians prior to scheduling the first appointment    If \"NO\"- Consent to Treatment must be signed by all legal guardians prior to scheduling the first appointment    Behavioral Health Outpatient Intake History -     Presenting Problem (in patient's own words): anxiety, depression    Are there any communication barriers for this patient?     No                                               If yes, please describe barriers:     If there is a unique situation, please refer to [place] Laws/Jackelyn Tamayo for final determination.    Are you taking any psychiatric medications? Yes     If \"YES\" -What are they Seroquel, Klonopin     If \"YES\" -Who prescribes?     Has the Patient previously received outpatient Talk Therapy or Medication Management from [facility]  Yes        If \"YES\"- When, Where and with Whom? Group therapy        If \"NO\" -Has Patient received these services elsewhere?       If \"YES\" -When, Where, and with Whom?     Has the Patient abused alcohol or other substances in the last 6 months ? No  No concerns of substance abuse are reported.     If \"YES\" -What substance, How much, How often?      If illegal substance: Refer to [facility] (for TEVIN) or [facility].   If Alcohol in excess of 10 drinks per week:  Refer to [facility] (for TEVIN) or [facility] Off[facility]    Legal History-     Is this treatment court ordered? No   If \"yes \"send to :  Talk " "Therapy : Send to [place] Tamayo for final determination   Med Management: Send to Dr Thapa for final determination     Has the Patient been convicted of a felony?  No   If \"Yes\" send to -When, What?   Talk Therapy: Send to [place] Tamayo for final determination   Med Management: Send to Dr Thapa for final determination     ACCEPTED as a patient Yes  If \"Yes\" Appointment Date: 10/31    Referred Elsewhere? No  If “Yes” - (Where? Ex: [facility], [facility], [facility], [facility], etc.)        Name of Insurance Co:elizabeth   Insurance ID#[id number]   Insurance Phone #   If ins is primary or secondary?   If patient is a minor, parents information such as Name, D.O.B of guarantor.  "

## 2024-08-05 NOTE — TELEPHONE ENCOUNTER
Reason for call:   [x] Refill   [] Prior Auth  [] Other:     Office:   [] PCP/Provider -   [x] Specialty/Provider - Psychiatry     Medication: Clonazepam     Dose/Frequency: 1 mg tablet taken by mouth 3x daily     Quantity: 90    Pharmacy: [facility] [id number] [place], [place] - [address] [place] [address] [phone]     Does the patient have enough for 3 days?   [x] Yes   [] No - Send as HP to POD

## 2024-08-06 RX ORDER — CLONAZEPAM 1 MG/1
1 TABLET ORAL 3 TIMES DAILY
Qty: 90 TABLET | Refills: 0 | Status: SHIPPED | OUTPATIENT
Start: 2024-08-06 | End: 2024-09-05

## 2024-08-15 ENCOUNTER — OFFICE VISIT (OUTPATIENT)
Age: 64
End: 2024-08-15
Payer: COMMERCIAL

## 2024-08-15 VITALS
WEIGHT: 190 LBS | BODY MASS INDEX: 28.79 KG/M2 | SYSTOLIC BLOOD PRESSURE: 112 MMHG | OXYGEN SATURATION: 96 % | DIASTOLIC BLOOD PRESSURE: 70 MMHG | HEART RATE: 108 BPM | HEIGHT: 68 IN

## 2024-08-15 DIAGNOSIS — R63.4 UNINTENTIONAL WEIGHT LOSS: ICD-10-CM

## 2024-08-15 DIAGNOSIS — R10.9 ABDOMINAL CRAMPING: ICD-10-CM

## 2024-08-15 DIAGNOSIS — K52.9 CHRONIC DIARRHEA: Primary | ICD-10-CM

## 2024-08-15 PROCEDURE — 99214 OFFICE O/P EST MOD 30 MIN: CPT | Performed by: INTERNAL MEDICINE

## 2024-08-15 RX ORDER — DICYCLOMINE HYDROCHLORIDE 10 MG/1
10 CAPSULE ORAL
Qty: 120 CAPSULE | Refills: 5 | Status: SHIPPED | OUTPATIENT
Start: 2024-08-15

## 2024-08-15 NOTE — PROGRESS NOTES
[facility] - Outpatient Note  Linette Davila 64 y.o. female MRN: [id number]  Encounter: [id number]      ASSESSMENT AND PLAN:    Linette Davila is a 64 y.o. old pleasant female with PMH of significant anxiety, smoking, depression who presents for followup    Abdominal Cramping  Diarrhea  Unintentional weight loss   - Highly suspect a functional component to her symptoms given severe anxiety that appears uncontrolled  Check stool studies  Increase Bentyl to 20 mg 4 times daily  Continue omeprazole  Check SIBO test which is currently backorder however we will let her know when it is back in order  Check CT chest abdomen pelvis given significant weight loss and significant smoking history with abdominal pain  I suspect a lot of her symptoms are from severe anxiety gastric emptying study scheduled for next month  Consider avoiding diary in the future  Consider Xifaxan    Subepithelial duodenal lesion-seen on recent EGD   plan for EUS          1. Abdominal cramping    - dicyclomine (BENTYL) 10 mg capsule; Take 1 capsule (10 mg total) by mouth 4 (four) times a day (before meals and at bedtime)  Dispense: 120 capsule; Refill: 5  - CT chest abdomen pelvis w contrast; Future    2. Chronic diarrhea    - TSH, 3rd generation with Free T4 reflex; Future  - Giardia lamblia, EIA and Ova and Parasites Examination; Future  - Pancreatic elastase, fecal; Future  - Celiac Disease Antibody Profile; Future  - Stool Enteric Bacterial Panel by PCR; Future  - Calprotectin,Fecal; Future  - Clostridium difficile toxin by PCR with EIA; Future  - Stool culture; Future  - CT chest abdomen pelvis w contrast; Future    3. Unintentional weight loss    - CT chest abdomen pelvis w contrast; Future    ______________________________________________________________________    SUBJECTIVE:      Patient reports Bentyl helped significant with her pain however recently her pain has worsened in the upper abdominal and  lower abdominal worse in the morning occurring most days of the week lasting hours to even days.  She does have chronic diarrhea 3-4 loose bowel movements per day for several months.  She believes her symptoms are triggered by anxiety however is worried about cancer given her significant weight loss and continued pain.  It appears anxiety is not under control.      I reviewed prior external notes    I reviewed previous lab results and images      REVIEW OF SYSTEMS:     REVIEW OF ALL OTHER SYSTEMS IS OTHERWISE NEGATIVE.      Historical Information   Past Medical History:   Diagnosis Date    Anxiety     Depression 1987    SVT (supraventricular tachycardia)     Varicella 1987     Past Surgical History:   Procedure Laterality Date    CARDIAC ELECTROPHYSIOLOGY PROCEDURE N/A 05/30/2023    Procedure: Cardiac eps/svt ablation;  Surgeon: Jigar Roe MD;  Location: [facility];  Service: Cardiology    JOINT REPLACEMENT  2010    right hip     Social History   Social History     Substance and Sexual Activity   Alcohol Use Not Currently     Social History     Substance and Sexual Activity   Drug Use Not Currently    Comment: medical marijuana     Social History     Tobacco Use   Smoking Status Every Day    Current packs/day: 1.00    Average packs/day: 1 pack/day for 34.2 years (34.2 ttl pk-yrs)    Types: Cigarettes    Start date: 5/23/1990   Smokeless Tobacco Never     Family History   Problem Relation Age of Onset    Depression Mother     Anxiety disorder Mother     Colon polyps Mother     Hearing loss Mother     Irritable bowel syndrome Mother     Ulcerative colitis Mother     Alcohol abuse Father     Addiction problem Father     Depression Brother     Anxiety disorder Brother     Colon cancer Maternal Grandmother     Depression Maternal Grandmother     Mental illness Maternal Grandmother     Stomach cancer Maternal Grandmother        Meds/Allergies       Current Outpatient Medications:     clonazePAM (KlonoPIN) 1  "mg tablet    diclofenac (VOLTAREN) 75 mg EC tablet    dicyclomine (BENTYL) 10 mg capsule    omeprazole (PriLOSEC) 40 MG capsule    QUEtiapine (SEROquel) 100 mg tablet    sertraline (ZOLOFT) 100 mg tablet    No Known Allergies        Objective     Blood pressure 112/70, pulse (!) 108, height 5' 8\" (1.727 m), weight 86.2 kg (190 lb), SpO2 96%. Body mass index is 28.89 kg/m².      PHYSICAL EXAM:      General Appearance:   Alert, cooperative, no distress   HEENT:   Normocephalic, atraumatic, anicteric.     Neck:  Supple, symmetrical, trachea midline   Lungs:   Clear to auscultation bilaterally; no rales, rhonchi or wheezing; respirations unlabored    Heart::   Regular rate and rhythm; no murmur, rub, or gallop.   Abdomen:   Soft, non-tender, non-distended; normal bowel sounds; no masses, no organomegaly    Genitalia:   Deferred    Rectal:   Deferred    Extremities:  No cyanosis, clubbing or edema    Pulses:  2+ and symmetric    Skin:  No jaundice, rashes, or lesions    Lymph nodes:  No palpable cervical lymphadenopathy        Lab Results:   No visits with results within 1 Day(s) from this visit.   Latest known visit with results is:   Office Visit on 04/30/2024   Component Date Value    Urine Culture 04/30/2024 No Growth <1000 cfu/mL     LEUKOCYTE ESTERASE,UA 04/30/2024 ++     NITRITE,UA 04/30/2024 neg     SL AMB POCT UROBILINOGEN 04/30/2024 neg     POCT URINE PROTEIN 04/30/2024 neg      PH,UA 04/30/2024 normal     BLOOD,UA 04/30/2024 neg     SPECIFIC GRAVITY,UA 04/30/2024 normal     KETONES,UA 04/30/2024 neg     BILIRUBIN,UA 04/30/2024 neg     GLUCOSE, UA 04/30/2024 neg      COLOR,UA 04/30/2024 light yellow     CLARITY,UA 04/30/2024 clear     Case Report 04/30/2024                      Value:Gynecologic Cytology Report                       Case: [id number]                                  Authorizing Provider:  Etelvina Dover DO         Collected:           04/30/2024 1405              Ordering Location:     [facility]" Obstetrics &      Received:            04/30/2024 1405                                     [facility]                                                                               [place]                                                                    First Screen:          Cornelio Oliva                                                                 Specimen:    LIQUID-BASED PAP, SCREENING, Cervix                                                        Primary Interpretation 04/30/2024 Negative for intraepithelial lesion or malignancy     Specimen Adequacy 04/30/2024 Satisfactory for evaluation. Endocervical/transformation zone component present.     Additional Information 04/30/2024                      Value:[employer]'s FDA approved ,  and ThinPrep Imaging Duo System are                           utilized with strict adherence to the 's instruction manual to                           prepare gynecologic and non-gynecologic cytology specimens for the                           production of ThinPrep slides as well as for gynecologic ThinPrep imaging.                           These processes have been validated by our laboratory and/or by the                           .                          The Pap test is not a diagnostic procedure and should not be used as the                           sole means to detect cervical cancer. It is only a screening procedure to                           aid in the detection of cervical cancer and its precursors. Both                           false-negative and false-positive results have been experienced. Your                           patient's test result should be interpreted in this context together with                           the history and clinical findings.    Gross Description 04/30/2024                      Value:20 ml , colorless, cloudy received in a ThinPrep vial.    HPV Other HR 04/30/2024 Negative     HPV16  04/30/2024 Negative     HPV18 04/30/2024 Negative          Radiology Results:   No results found.

## 2024-08-16 ENCOUNTER — TELEPHONE (OUTPATIENT)
Age: 64
End: 2024-08-16

## 2024-08-19 ENCOUNTER — APPOINTMENT (OUTPATIENT)
Dept: LAB | Facility: CLINIC | Age: 64
End: 2024-08-19
Payer: COMMERCIAL

## 2024-08-19 ENCOUNTER — TELEPHONE (OUTPATIENT)
Age: 64
End: 2024-08-19

## 2024-08-19 DIAGNOSIS — Z00.00 HEALTHCARE MAINTENANCE: Primary | ICD-10-CM

## 2024-08-19 DIAGNOSIS — K52.9 CHRONIC DIARRHEA: ICD-10-CM

## 2024-08-19 DIAGNOSIS — Z00.00 HEALTHCARE MAINTENANCE: ICD-10-CM

## 2024-08-19 LAB
ALBUMIN SERPL BCG-MCNC: 3.8 G/DL (ref 3.5–5)
ALP SERPL-CCNC: 80 U/L (ref 34–104)
ALT SERPL W P-5'-P-CCNC: 11 U/L (ref 7–52)
ANION GAP SERPL CALCULATED.3IONS-SCNC: 8 MMOL/L (ref 4–13)
AST SERPL W P-5'-P-CCNC: 15 U/L (ref 13–39)
BILIRUB SERPL-MCNC: 0.35 MG/DL (ref 0.2–1)
BUN SERPL-MCNC: 12 MG/DL (ref 5–25)
CALCIUM SERPL-MCNC: 9.2 MG/DL (ref 8.4–10.2)
CHLORIDE SERPL-SCNC: 105 MMOL/L (ref 96–108)
CO2 SERPL-SCNC: 27 MMOL/L (ref 21–32)
CREAT SERPL-MCNC: 0.81 MG/DL (ref 0.6–1.3)
ERYTHROCYTE [DISTWIDTH] IN BLOOD BY AUTOMATED COUNT: 13.7 % (ref 11.6–15.1)
GFR SERPL CREATININE-BSD FRML MDRD: 77 ML/MIN/1.73SQ M
GLUCOSE P FAST SERPL-MCNC: 87 MG/DL (ref 65–99)
HCT VFR BLD AUTO: 42.5 % (ref 34.8–46.1)
HGB BLD-MCNC: 13.5 G/DL (ref 11.5–15.4)
MCH RBC QN AUTO: 28.4 PG (ref 26.8–34.3)
MCHC RBC AUTO-ENTMCNC: 31.8 G/DL (ref 31.4–37.4)
MCV RBC AUTO: 90 FL (ref 82–98)
PLATELET # BLD AUTO: 249 THOUSANDS/UL (ref 149–390)
PMV BLD AUTO: 11.5 FL (ref 8.9–12.7)
POTASSIUM SERPL-SCNC: 4.2 MMOL/L (ref 3.5–5.3)
PROT SERPL-MCNC: 7 G/DL (ref 6.4–8.4)
RBC # BLD AUTO: 4.75 MILLION/UL (ref 3.81–5.12)
SODIUM SERPL-SCNC: 140 MMOL/L (ref 135–147)
TSH SERPL DL<=0.05 MIU/L-ACNC: 1.44 UIU/ML (ref 0.45–4.5)
WBC # BLD AUTO: 11.49 THOUSAND/UL (ref 4.31–10.16)

## 2024-08-19 PROCEDURE — 86231 EMA EACH IG CLASS: CPT

## 2024-08-19 PROCEDURE — 86258 DGP ANTIBODY EACH IG CLASS: CPT

## 2024-08-19 PROCEDURE — 84443 ASSAY THYROID STIM HORMONE: CPT

## 2024-08-19 PROCEDURE — 85027 COMPLETE CBC AUTOMATED: CPT

## 2024-08-19 PROCEDURE — 80053 COMPREHEN METABOLIC PANEL: CPT

## 2024-08-19 PROCEDURE — 86364 TISS TRNSGLTMNASE EA IG CLAS: CPT

## 2024-08-19 PROCEDURE — 82784 ASSAY IGA/IGD/IGG/IGM EACH: CPT

## 2024-08-19 PROCEDURE — 36415 COLL VENOUS BLD VENIPUNCTURE: CPT

## 2024-08-19 NOTE — TELEPHONE ENCOUNTER
Pt. Is calling asking if there were additional blood test to be done, pt. Is asking for full blood panel of CBC and CMP along with other labs, advised that the celiac panel and TSH were the only lab tests ordered, please advise if further testing is required , pt. Would like a call back if additional las are needed

## 2024-08-21 LAB
ENDOMYSIUM IGA SER QL: NEGATIVE
GLIADIN PEPTIDE IGA SER-ACNC: 2 UNITS (ref 0–19)
GLIADIN PEPTIDE IGG SER-ACNC: 1 UNITS (ref 0–19)
IGA SERPL-MCNC: 109 MG/DL (ref 87–352)
TTG IGA SER-ACNC: <2 U/ML (ref 0–3)
TTG IGG SER-ACNC: 2 U/ML (ref 0–5)

## 2024-08-27 ENCOUNTER — HOSPITAL ENCOUNTER (OUTPATIENT)
Dept: RADIOLOGY | Facility: CLINIC | Age: 64
Discharge: HOME | End: 2024-08-27
Payer: COMMERCIAL

## 2024-08-27 VITALS
RESPIRATION RATE: 18 BRPM | OXYGEN SATURATION: 94 % | HEART RATE: 73 BPM | DIASTOLIC BLOOD PRESSURE: 75 MMHG | SYSTOLIC BLOOD PRESSURE: 137 MMHG

## 2024-08-27 DIAGNOSIS — M54.42 CHRONIC BILATERAL LOW BACK PAIN WITH BILATERAL SCIATICA: ICD-10-CM

## 2024-08-27 DIAGNOSIS — G89.29 CHRONIC BILATERAL LOW BACK PAIN WITH BILATERAL SCIATICA: ICD-10-CM

## 2024-08-27 DIAGNOSIS — M54.41 CHRONIC BILATERAL LOW BACK PAIN WITH BILATERAL SCIATICA: ICD-10-CM

## 2024-08-27 DIAGNOSIS — M47.816 LUMBAR FACET ARTHROPATHY: ICD-10-CM

## 2024-08-27 PROCEDURE — 64493 INJ PARAVERT F JNT L/S 1 LEV: CPT | Performed by: STUDENT IN AN ORGANIZED HEALTH CARE EDUCATION/TRAINING PROGRAM

## 2024-08-27 PROCEDURE — 64494 INJ PARAVERT F JNT L/S 2 LEV: CPT | Performed by: STUDENT IN AN ORGANIZED HEALTH CARE EDUCATION/TRAINING PROGRAM

## 2024-08-27 RX ORDER — BUPIVACAINE HCL/PF 2.5 MG/ML
3 VIAL (ML) INJECTION ONCE
Status: COMPLETED | OUTPATIENT
Start: 2024-08-27 | End: 2024-08-27

## 2024-08-27 RX ADMIN — Medication 3 ML: at 15:26

## 2024-08-27 NOTE — DISCHARGE INSTR - LAB

## 2024-08-27 NOTE — INTERVAL H&P NOTE
Update: (This section must be completed if the H&P was completed greater than 24 hrs to procedure or admission)    H&P reviewed. After examining the patient, I find no changed to the H&P since it had been written.    Patient re-evaluated. Accept as history and physical.    Dima Vides MD/August 27, 2024/3:09 PM

## 2024-08-28 ENCOUNTER — TELEPHONE (OUTPATIENT)
Dept: RADIOLOGY | Facility: CLINIC | Age: 64
End: 2024-08-28

## 2024-08-28 DIAGNOSIS — M54.42 CHRONIC BILATERAL LOW BACK PAIN WITH BILATERAL SCIATICA: Primary | ICD-10-CM

## 2024-08-28 DIAGNOSIS — G89.29 CHRONIC BILATERAL LOW BACK PAIN WITH BILATERAL SCIATICA: Primary | ICD-10-CM

## 2024-08-28 DIAGNOSIS — M54.41 CHRONIC BILATERAL LOW BACK PAIN WITH BILATERAL SCIATICA: Primary | ICD-10-CM

## 2024-08-28 NOTE — TELEPHONE ENCOUNTER
"Pt had Darren Lumbar MBB #1 yesterday  Pt is trying to upload pain diary, but reports this through [employer] message:    \"I had a nerve blocks on my back on 8/27. My pain level was relieved by approximately 50% for about 3 hours. It then went back to an 8 on the pain scale\"    Please advise  "

## 2024-08-28 NOTE — TELEPHONE ENCOUNTER
Level and duration of relief unaccetable. I am ordering MRI of the lumbar spine to get a better look at the soft tissues in her spine

## 2024-08-29 ENCOUNTER — TELEPHONE (OUTPATIENT)
Dept: PSYCHIATRY | Facility: CLINIC | Age: 64
End: 2024-08-29

## 2024-09-04 ENCOUNTER — TELEPHONE (OUTPATIENT)
Dept: BEHAVIORAL/MENTAL HEALTH CLINIC | Facility: CLINIC | Age: 64
End: 2024-09-04

## 2024-09-04 NOTE — TELEPHONE ENCOUNTER
This writer made an attempt to contact patient to offer sooner appt in the [place] office.  Left message for patient to contact writer directly.

## 2024-09-05 ENCOUNTER — HOSPITAL ENCOUNTER (OUTPATIENT)
Dept: CT IMAGING | Facility: HOSPITAL | Age: 64
End: 2024-09-05
Attending: INTERNAL MEDICINE
Payer: COMMERCIAL

## 2024-09-05 DIAGNOSIS — R10.9 ABDOMINAL CRAMPING: ICD-10-CM

## 2024-09-05 DIAGNOSIS — R63.4 UNINTENTIONAL WEIGHT LOSS: ICD-10-CM

## 2024-09-05 DIAGNOSIS — K52.9 CHRONIC DIARRHEA: ICD-10-CM

## 2024-09-05 PROCEDURE — 74177 CT ABD & PELVIS W/CONTRAST: CPT

## 2024-09-05 PROCEDURE — 71260 CT THORAX DX C+: CPT

## 2024-09-05 RX ADMIN — IOHEXOL 100 ML: 350 INJECTION, SOLUTION INTRAVENOUS at 07:43

## 2024-09-06 DIAGNOSIS — F41.1 GAD (GENERALIZED ANXIETY DISORDER): ICD-10-CM

## 2024-09-06 RX ORDER — CLONAZEPAM 1 MG/1
1 TABLET ORAL 3 TIMES DAILY
Qty: 90 TABLET | Refills: 0 | Status: SHIPPED | OUTPATIENT
Start: 2024-09-06 | End: 2024-10-06

## 2024-09-06 NOTE — TELEPHONE ENCOUNTER
Reason for call:   [x] Refill   [] Prior Auth  [] Other:     Office:   [] PCP/Provider -   [x] Specialty/Provider - psych [facility]oc [facility]    Medication: clonazePAM (KlonoPIN) 1 mg tablet     Dose/Frequency: Take 1 tablet (1 mg total) by mouth 3 (three) times a day     Quantity: 90    Pharmacy: [facility] [id number] - [place], [place] - [facility]     Does the patient have enough for 3 days?   [x] Yes   [] No - Send as HP to POD

## 2024-09-06 NOTE — TELEPHONE ENCOUNTER
Refill must be reviewed and completed by the office or provider. The refill is unable to be approved or denied by the medication management team. Cannot be delegated     08/06/2024 08/06/2024 clonazePAM (Tablet) 90.0 30 1 MG  [facility] 0 / 0 [place]      1 [id number] 07/05/2024 07/05/2024 clonazePAM (Tablet) 90.0 30 1 MG [facility] 0 / 0 [place]    1 [id number] 06/06/2024 06/06/2024 clonazePAM (Tablet) 90.0 30 1 MG  [facility]

## 2024-09-10 ENCOUNTER — TELEPHONE (OUTPATIENT)
Age: 64
End: 2024-09-10

## 2024-09-10 ENCOUNTER — TELEPHONE (OUTPATIENT)
Dept: RADIOLOGY | Facility: CLINIC | Age: 64
End: 2024-09-10

## 2024-09-10 NOTE — TELEPHONE ENCOUNTER
Jennyfer Malloy  [facility] [place] [facility]  MRI Lumbar spine: Lynnranre needs additional information, see below     In order to process this case, please provide Dates and duration of provider-guided conservative treatment, within the past 12 weeks related to this MRI Lumbar Spine, (spinal canal and contents); without contrast request.    Or a peer to peer can be done by calling [phone]  Order# [id number]    Patient is scheduled for 9/17

## 2024-09-10 NOTE — TELEPHONE ENCOUNTER
Caller: Linette    Doctor: SP    Reason for call: pt adv that insurance needs details as to why pt needs to have an MRI    Call back#: [phone]

## 2024-09-12 ENCOUNTER — TELEMEDICINE (OUTPATIENT)
Dept: PSYCHIATRY | Facility: CLINIC | Age: 64
End: 2024-09-12
Payer: COMMERCIAL

## 2024-09-12 DIAGNOSIS — F33.2 MDD (MAJOR DEPRESSIVE DISORDER), RECURRENT SEVERE, WITHOUT PSYCHOSIS (HCC): ICD-10-CM

## 2024-09-12 DIAGNOSIS — F41.1 GAD (GENERALIZED ANXIETY DISORDER): Primary | ICD-10-CM

## 2024-09-12 PROCEDURE — 99214 OFFICE O/P EST MOD 30 MIN: CPT

## 2024-09-12 RX ORDER — QUETIAPINE FUMARATE 100 MG/1
100 TABLET, FILM COATED ORAL 3 TIMES DAILY
Qty: 90 TABLET | Refills: 0 | Status: SHIPPED | OUTPATIENT
Start: 2024-09-12

## 2024-09-12 RX ORDER — GABAPENTIN 300 MG/1
300 CAPSULE ORAL 2 TIMES DAILY
Qty: 60 CAPSULE | Refills: 1 | Status: SHIPPED | OUTPATIENT
Start: 2024-09-12

## 2024-09-12 NOTE — TELEPHONE ENCOUNTER
LMOM to CB, CB# provided  Did pt document HEP given to her by Dr Vides in July?  Need this documentation to get MRI approved scheduled 9/17

## 2024-09-12 NOTE — PSYCH
Virtual Regular Visit    Verification of patient location:    Patient is located in the following state in which I hold an active license [place]    Assessment & Plan  JACOBY (generalized anxiety disorder)  Initiate gabapentin 300 mg twice daily for continued severe anxiety  MDD (major depressive disorder), recurrent severe, without psychosis (HCC)  Continue Zoloft 100 mg daily, controlled.               Encounter provider WILL Oconnell    Provider located at    [facility]  [address] [facility] [address] [phone]    Recent Visits  No visits were found meeting these conditions.  Showing recent visits within past 7 days and meeting all other requirements  Today's Visits  Date Type Provider Dept   09/12/24 Telemedicine WILL Oconnell [facility]   Showing today's visits and meeting all other requirements  Future Appointments  No visits were found meeting these conditions.  Showing future appointments within next 150 days and meeting all other requirements         The patient was identified by name and date of birth. Linette Davila was informed that this is a telemedicine visit and that the visit is being conducted throughthe Epic Embedded platform. She agrees to proceed..  My office door was closed. No one else was in the room.  She acknowledged consent and understanding of privacy and security of the video platform. The patient has agreed to participate and understands they can discontinue the visit at any time.    Patient is aware this is a billable service.     HPI     Current Outpatient Medications   Medication Sig Dispense Refill    clonazePAM (KlonoPIN) 1 mg tablet Take 1 tablet (1 mg total) by mouth 3 (three) times a day 90 tablet 0    dicyclomine (BENTYL) 10 mg capsule Take 1 capsule (10 mg total) by mouth 4 (four) times a day (before meals and at bedtime) 120 capsule 5    omeprazole (PriLOSEC) 40 MG  "capsule Take 1 capsule (40 mg total) by mouth 2 (two) times a day 200 capsule 1    QUEtiapine (SEROquel) 100 mg tablet Take 1 tablet (100 mg total) by mouth 3 (three) times a day 90 tablet 0    sertraline (ZOLOFT) 100 mg tablet Take 1 tablet (100 mg total) by mouth daily 90 tablet 0     No current facility-administered medications for this visit.       Review of Systems  Video Exam    There were no vitals filed for this visit.    Physical Exam   As a result of this visit, I have referred the patient for further respiratory evaluation. No      VIRTUAL VISIT DISCLAIMER    Linette Davila acknowledges that she has consented to an online visit or consultation. She understands that the online visit is based solely on information provided by her, and that, in the absence of a face-to-face physical evaluation by the physician, the diagnosis she receives is both limited and provisional in terms of accuracy and completeness. This is not intended to replace a full medical face-to-face evaluation by the physician. Linette Davila understands and accepts these terms.    MEDICATION MANAGEMENT NOTE        [facility]    Name and Date of Birth:  Linette Davila 64 y.o. 1960 MRN: [id number]    Date of Visit: September 12, 2024    No Known Allergies  SUBJECTIVE:    Linette is seen today for a follow up for Major Depressive Disorder and Generalized Anxiety Disorder. She reports that she continues to experience on and off symptoms since the last visit.  Linette continues to experience high levels of anxiety symptoms to the point where her stomach gets upset with a daily basis, states \"everything makes me anxious\" and has no specific trigger or cause of chronic severe anxiety symptoms.  Does state anxiety is a bit heightened right now due to the current political landscape and upcoming election.  Symptoms have persisted for over 40 years according to patient, we discussed " coping skills, partial program which she completed in the recent past.  Patient brought up having her first psychotherapy appointment in the upcoming weeks and wants to wait to see how therapy goes, provider agreed to plan.  She continues to use Klonopin 1 mg 3 times daily for severe anxiety, using appropriately with no adverse effects.  Provider continues to attempt to decrease medication the patient unable to due to severe debilitating anxiety.  Patient agreeable to try another anxiolytic at this time to relieve chronic symptoms, initiate gabapentin 300 mg twice daily, side effects plan, patient verbalized understanding.  She is hopeful she is able to tolerate medication and provide some anxiety relief.  Continue Zoloft 100 mg daily, has been the most effective SSRI that she is able to tolerate at this point, continue Seroquel 100 mg 3 times daily for severe anxiety, denies adverse effects.  Depression remains mild to moderate and states it is caused by her anxiety symptoms.  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  She is considering getting a part-time job in order to keep herself busy and avoid her mind racing, having difficulty finding a suitable part-time job at this time her provider continues to recommend.  Consider partial program for chronic anxiety.  Initiate psychotherapy.  Denies SI.        She denies any side effects from medications.    PLAN:     -Continue Zoloft 100mg daily   PARQ completed including serotonin syndrome, SIADH, worsening depression, suicidality, induction of flaquito, GI upset, headaches, activation, sexual side effects, sedation, potential drug interactions, and others.      -Initiate Gabapentin 300mg BID for anxiety, pt failed buspar in past   PARQ for neurontin including depression/suicidality, allergic reactions (SJS, angioedema, rhabdomyolysis, eosinophilia, anaphylaxis), dizziness and somnolence, diarrhea, xerostomia, headaches, drug  interactions and others.       -Continue Seroquel 100 mg TID for anxiety, insomnia   -Klonopin 1 mg 3 times daily for severe anxiety and panic- on this medication for over 20 years, using appropriately, provider recommends decreasing usage and if unable to consider entering partial program again to increase coping skills   Discussed with patient the risks of sedation, respiratory depression, impairment of ability to drive and potential for abuse and addiction related to treatment with benzodiazepine medications. The patient understands risk of treatment with benzodiazepine medications, agrees to not drive if feels impaired and agrees to take medications as prescribed. Patient was also informed of risks of being on or starting opioid medications due to drug interactions and potential for serious respiratory depression and death        Aware of 24 hour and weekend coverage for urgent situations accessed by calling [facility] main practice number  Referral for individual psychotherapy    Diagnoses and all orders for this visit:    JACOBY (generalized anxiety disorder)    MDD (major depressive disorder), recurrent severe, without psychosis (HCC)          Current Outpatient Medications on File Prior to Visit   Medication Sig Dispense Refill    clonazePAM (KlonoPIN) 1 mg tablet Take 1 tablet (1 mg total) by mouth 3 (three) times a day 90 tablet 0    dicyclomine (BENTYL) 10 mg capsule Take 1 capsule (10 mg total) by mouth 4 (four) times a day (before meals and at bedtime) 120 capsule 5    omeprazole (PriLOSEC) 40 MG capsule Take 1 capsule (40 mg total) by mouth 2 (two) times a day 200 capsule 1    QUEtiapine (SEROquel) 100 mg tablet Take 1 tablet (100 mg total) by mouth 3 (three) times a day 90 tablet 0    sertraline (ZOLOFT) 100 mg tablet Take 1 tablet (100 mg total) by mouth daily 90 tablet 0    [DISCONTINUED] diclofenac (VOLTAREN) 75 mg EC tablet Take 1 tablet (75 mg total) by mouth 2 (two) times a  day 60 tablet 1     No current facility-administered medications on file prior to visit.       Psychotherapy Provided:       Supportive counseling provided.  Medication changes discussed with Linette.  Medication education provided to Linette.     HPI ROS Appetite Changes and Sleep:     She reports difficulty falling asleep, adequate appetite, low energy. Denies homicidal ideation, denies suicidal ideation    Review Of Systems:      HPI ROS:               Medication Side Effects:  denies    Depression (10 worst): 3/10    Anxiety (10 worst): 8/10    Safety concerns (SI, HI, etc): Denies si and hi    Sleep: 6 hrs, better with seroquel    Energy: low    Appetite: 2 meals    Weight Change: denies        Mental Status Evaluation:    Appearance Adequate hygiene and grooming   Behavior calm and cooperative   Mood anxious and depressed  Depression Scale - 4 of 10 (0 = No depression)  Anxiety Scale - 8 of 10 (0 = No anxiety)   Speech Normal rate and volume   Affect constricted   Thought Processes Goal directed and coherent   Thought Content Does not verbalize delusional material   Associations Tightly connected   Perceptual Disturbances Denies hallucinations and does not appear to be responding to internal stimuli   Risk Potential Suicidal/Homicidal Ideation - No evidence of suicidal or homicidal ideation and patient does not verbalize suicidal or homicidal ideation  Risk of Violence - No evidence of risk for violence found on assessment  Risk of Self Mutilation - No evidence of risk for self mutilation found on assessment   Orientation oriented to person, place, time/date and situation   Memory recent and remote memory grossly intact   Consciousness alert and awake   Attention/Concentration attention span and concentration are age appropriate   Insight intact   Judgement intact   Muscle Strength and Gait normal muscle strength and normal muscle tone, normal gait/station and normal balance   Motor Activity no abnormal  movements   Language no difficulty naming common objects, no difficulty repeating a phrase, no difficulty writing a sentence   Fund of Knowledge adequate knowledge of current events  adequate fund of knowledge regarding past history  adequate fund of knowledge regarding vocabulary          Past Medical History:    Past Medical History:   Diagnosis Date    Anxiety     Depression 1987    SVT (supraventricular tachycardia)     Varicella 1987        Past Surgical History:   Procedure Laterality Date    CARDIAC ELECTROPHYSIOLOGY PROCEDURE N/A 05/30/2023    Procedure: Cardiac eps/svt ablation;  Surgeon: Jigar Roe MD;  Location: [facility];  Service: Cardiology    JOINT REPLACEMENT  2010    right hip     No Known Allergies  Substance Abuse History:    Social History     Substance and Sexual Activity   Alcohol Use Not Currently     Social History     Substance and Sexual Activity   Drug Use Not Currently    Comment: medical marijuana     Social History:    Social History     Socioeconomic History    Marital status: /Civil Union     Spouse name: Not on file    Number of children: Not on file    Years of education: Not on file    Highest education level: Not on file   Occupational History    Not on file   Tobacco Use    Smoking status: Every Day     Current packs/day: 1.00     Average packs/day: 1 pack/day for 34.3 years (34.3 ttl pk-yrs)     Types: Cigarettes     Start date: 5/23/1990    Smokeless tobacco: Never   Vaping Use    Vaping status: Never Used   Substance and Sexual Activity    Alcohol use: Not Currently    Drug use: Not Currently     Comment: medical marijuana    Sexual activity: Not Currently     Partners: Female, Male     Birth control/protection: Abstinence, Post-menopausal   Other Topics Concern    Not on file   Social History Narrative    Not on file     Social Determinants of Health     Financial Resource Strain: Not on file   Food Insecurity: Not on file   Transportation Needs: Not on  file   Physical Activity: Not on file   Stress: Not on file   Social Connections: Not on file   Intimate Partner Violence: Not on file   Housing Stability: Not on file     Family Psychiatric History:     Family History   Problem Relation Age of Onset    Depression Mother     Anxiety disorder Mother     Colon polyps Mother     Hearing loss Mother     Irritable bowel syndrome Mother     Ulcerative colitis Mother     Alcohol abuse Father     Addiction problem Father     Depression Brother     Anxiety disorder Brother     Colon cancer Maternal Grandmother     Depression Maternal Grandmother     Mental illness Maternal Grandmother     Stomach cancer Maternal Grandmother      History Review:The following portions of the patient's history were reviewed and updated as appropriate: allergies, current medications, past family history, past medical history, past social history, past surgical history and problem list     OBJECTIVE:     Vital signs in last 24 hours:    There were no vitals filed for this visit.  Laboratory Results:   Recent Labs (last 2 months):   Appointment on 08/19/2024   Component Date Value    TSH 3RD GENERATON 08/19/2024 1.437     IgA 08/19/2024 109     Gliadin IgA 08/19/2024 2     Gliadin IgG 08/19/2024 1     Tissue Transglut Ab IGG 08/19/2024 2     TISSUE TRANSGLUTAMINASE * 08/19/2024 <2     Endomysial IgA 08/19/2024 Negative     WBC 08/19/2024 11.49 (H)     RBC 08/19/2024 4.75     Hemoglobin 08/19/2024 13.5     Hematocrit 08/19/2024 42.5     MCV 08/19/2024 90     MCH 08/19/2024 28.4     MCHC 08/19/2024 31.8     RDW 08/19/2024 13.7     Platelets 08/19/2024 249     MPV 08/19/2024 11.5     Sodium 08/19/2024 140     Potassium 08/19/2024 4.2     Chloride 08/19/2024 105     CO2 08/19/2024 27     ANION GAP 08/19/2024 8     BUN 08/19/2024 12     Creatinine 08/19/2024 0.81     Glucose, Fasting 08/19/2024 87     Calcium 08/19/2024 9.2     AST 08/19/2024 15     ALT 08/19/2024 11     Alkaline Phosphatase 08/19/2024  80     Total Protein 08/19/2024 7.0     Albumin 08/19/2024 3.8     Total Bilirubin 08/19/2024 0.35     eGFR 08/19/2024 77      I have personally reviewed all pertinent laboratory/tests results.    Suicide/Homicide Risk Assessment:    Risk of Harm to Self:  Protective Factors: no current suicidal ideation, access to mental health treatment, being , compliant with medications, compliant with mental health treatment, good self-esteem, having a desire to be alive  Based on today's assessment, Linette presents the following risk of harm to self: minimal    Risk of Harm to Others:  Based on today's assessment, Linette presents the following risk of harm to others: none    The following interventions are recommended: referral for psychotherapy    Medications Risks/Benefits:      Risks, Benefits And Possible Side Effects Of Medications:    Discussed risks and benefits of treatment with patient including risk of suicidality, serotonin syndrome, increased QTc interval and SIADH related to treatment with antidepressants; Risk of induction of manic symptoms in certain patient populations, risk of parkinsonian symptoms, metabolic syndrome, tardive dyskinesia and neuroleptic malignant syndrome related to treatment with antipsychotic medications, risks of misuse, abuse or dependence, sedation and respiratory depression related to treatment with benzodiazepine medications, and Risk of sedation, dizziness and CNS depression during treatment with Gabapentin     Controlled Medication Discussion:     Linette has been filling controlled prescriptions on time as prescribed according to [place] Prescription Drug Monitoring Program  Discussed with Linette the risks of sedation, respiratory depression, impairment of ability to drive and potential for abuse and addiction related to treatment with benzodiazepine medications. She understands risk of treatment with benzodiazepine medications, agrees to not drive if feels impaired and  agrees to take medications as prescribed  Discussed with Linette Black Box warning on concurrent use of benzodiazepines and opioid medications including sedation, respiratory depression, coma and death. She understands the risk of treatment with benzodiazepines in addition to opioids and wants to continue taking those medications  Discussed with Linette increased risk of impairment related to concurrent use of benzodiazepines and hypnotic medications including excessive sedation, psychomotor impairment and respiratory depression. She understands the risk of treatment with benzodiazepines in addition to hypnotic medications and wants to continue taking those medications    Treatment Plan:    Due for update/Updated:   no  Last treatment plan done 9/12, due 3/12/25    WILL Oconnell 09/12/24    This note was shared with patient.      Visit Time    Visit Start Time: 230  Visit Stop Time: 253  Total Visit Duration:  23 minutes

## 2024-09-12 NOTE — BH TREATMENT PLAN
TREATMENT PLAN (Medication Management Only)        [facility]    Name and Date of Birth:  Linette Davila 64 y.o. 1960  Date of Treatment Plan: September 12, 2024  Diagnosis/Diagnoses:    1. JACOBY (generalized anxiety disorder)    2. MDD (major depressive disorder), recurrent severe, without psychosis (HCC)      Strengths/Personal Resources for Self-Care: supportive family, taking medications as prescribed, ability to communicate needs, ability to communicate well, independence.  Area/Areas of need (in own words): anxiety symptoms  1. Long Term Goal: improve control of acceptable anxiety level.  Target Date:6 months - 3/12/2025  Person/Persons responsible for completion of goal: Linette  2.  Short Term Objective (s) - How will we reach this goal?:   A. Provider new recommended medication/dosage changes and/or continue medication(s): continue current medications as prescribed.  B. Attend medication management appointments regularly.  C. Take psychiatric medications responsibly.  Target Date:6 months - 3/12/2025  Person/Persons Responsible for Completion of Goal: Linette  Progress Towards Goals: continuing treatment  Treatment Modality: medication management every 3 months  Review due 180 days from date of this plan: 6 months - 3/12/2025  Expected length of service: ongoing treatment  My Physician/PA/NP and I have developed this plan together and I agree to work on the goals and objectives. I understand the treatment goals that were developed for my treatment.

## 2024-09-12 NOTE — TELEPHONE ENCOUNTER
Likely needs ov to document lack of improvement with conservative treatment (patient was given HEP). MG ok

## 2024-09-15 DIAGNOSIS — F33.2 MDD (MAJOR DEPRESSIVE DISORDER), RECURRENT SEVERE, WITHOUT PSYCHOSIS (HCC): ICD-10-CM

## 2024-09-15 NOTE — TELEPHONE ENCOUNTER
Medication Refill Request     Name seroquel    Dose/Frequency 100mg TID  Quantity 90  Verified pharmacy   [x]  Verified ordering Provider   [x]  Does patient have enough for the next 3 days? Yes [x] No []

## 2024-09-16 RX ORDER — QUETIAPINE FUMARATE 100 MG/1
100 TABLET, FILM COATED ORAL 3 TIMES DAILY
Qty: 90 TABLET | Refills: 1 | OUTPATIENT
Start: 2024-09-16

## 2024-09-16 NOTE — TELEPHONE ENCOUNTER
Duplicate request. VM left informing Alexa that a script was sent a few days ago. Gave a call back number if there are any issues.

## 2024-09-16 NOTE — TELEPHONE ENCOUNTER
Caller: Linette     Doctor: Peewee     Reason for call: Patient calling stating MRI denied and received a call from insurance. Patient states would like to know next steps due to pain and unable to move please advise     Call back#: [phone]

## 2024-09-19 ENCOUNTER — TELEMEDICINE (OUTPATIENT)
Dept: BEHAVIORAL/MENTAL HEALTH CLINIC | Facility: CLINIC | Age: 64
End: 2024-09-19
Payer: COMMERCIAL

## 2024-09-19 DIAGNOSIS — F41.0 PANIC DISORDER WITHOUT AGORAPHOBIA: ICD-10-CM

## 2024-09-19 DIAGNOSIS — F45.22 BODY DYSMORPHIC DISORDER: ICD-10-CM

## 2024-09-19 DIAGNOSIS — F40.10 SOCIAL ANXIETY DISORDER: Primary | ICD-10-CM

## 2024-09-19 DIAGNOSIS — F33.2 MDD (MAJOR DEPRESSIVE DISORDER), RECURRENT SEVERE, WITHOUT PSYCHOSIS (HCC): Chronic | ICD-10-CM

## 2024-09-19 PROCEDURE — 90791 PSYCH DIAGNOSTIC EVALUATION: CPT
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
## 2024-09-20 DIAGNOSIS — F41.1 GAD (GENERALIZED ANXIETY DISORDER): ICD-10-CM

## 2024-09-20 DIAGNOSIS — F33.2 MDD (MAJOR DEPRESSIVE DISORDER), RECURRENT SEVERE, WITHOUT PSYCHOSIS (HCC): ICD-10-CM

## 2024-09-20 DIAGNOSIS — M47.816 LUMBAR FACET ARTHROPATHY: Primary | ICD-10-CM

## 2024-09-20 DIAGNOSIS — M54.41 CHRONIC BILATERAL LOW BACK PAIN WITH BILATERAL SCIATICA: ICD-10-CM

## 2024-09-20 DIAGNOSIS — G89.29 CHRONIC BILATERAL LOW BACK PAIN WITH BILATERAL SCIATICA: ICD-10-CM

## 2024-09-20 DIAGNOSIS — M54.42 CHRONIC BILATERAL LOW BACK PAIN WITH BILATERAL SCIATICA: ICD-10-CM

## 2024-09-20 RX ORDER — SERTRALINE HYDROCHLORIDE 100 MG/1
100 TABLET, FILM COATED ORAL DAILY
Qty: 90 TABLET | Refills: 0 | Status: SHIPPED | OUTPATIENT
Start: 2024-09-20

## 2024-09-20 NOTE — TELEPHONE ENCOUNTER
Reason for call:   [x] Refill   [] Prior Auth  [] Other:     Office:   [] PCP/Provider -   [x] Specialty/Provider - psych    Medication: sertraline (ZOLOFT) 100 mg tablet     Dose/Frequency: 100 mg, Oral, Daily     Quantity: 90    Pharmacy: [facility] [address] [place], [place]     Does the patient have enough for 3 days?   [x] Yes   [] No - Send as HP to POD

## 2024-09-26 ENCOUNTER — EVALUATION (OUTPATIENT)
Dept: PHYSICAL THERAPY | Facility: CLINIC | Age: 64
End: 2024-09-26
Payer: COMMERCIAL

## 2024-09-26 ENCOUNTER — TELEMEDICINE (OUTPATIENT)
Dept: BEHAVIORAL/MENTAL HEALTH CLINIC | Facility: CLINIC | Age: 64
End: 2024-09-26
Payer: COMMERCIAL

## 2024-09-26 DIAGNOSIS — M54.42 CHRONIC BILATERAL LOW BACK PAIN WITH BILATERAL SCIATICA: ICD-10-CM

## 2024-09-26 DIAGNOSIS — M54.41 CHRONIC BILATERAL LOW BACK PAIN WITH BILATERAL SCIATICA: ICD-10-CM

## 2024-09-26 DIAGNOSIS — F41.1 GAD (GENERALIZED ANXIETY DISORDER): ICD-10-CM

## 2024-09-26 DIAGNOSIS — G89.29 CHRONIC BILATERAL LOW BACK PAIN WITH BILATERAL SCIATICA: ICD-10-CM

## 2024-09-26 DIAGNOSIS — F33.2 MDD (MAJOR DEPRESSIVE DISORDER), RECURRENT SEVERE, WITHOUT PSYCHOSIS (HCC): Chronic | ICD-10-CM

## 2024-09-26 DIAGNOSIS — F40.10 SOCIAL ANXIETY DISORDER: ICD-10-CM

## 2024-09-26 DIAGNOSIS — M47.816 LUMBAR FACET ARTHROPATHY: Primary | ICD-10-CM

## 2024-09-26 DIAGNOSIS — F41.0 PANIC DISORDER WITHOUT AGORAPHOBIA: ICD-10-CM

## 2024-09-26 DIAGNOSIS — F45.22 BODY DYSMORPHIC DISORDER: Primary | ICD-10-CM

## 2024-09-26 PROCEDURE — 97112 NEUROMUSCULAR REEDUCATION: CPT | Performed by: PHYSICAL THERAPIST

## 2024-09-26 PROCEDURE — 90834 PSYTX W PT 45 MINUTES: CPT

## 2024-09-26 PROCEDURE — 97161 PT EVAL LOW COMPLEX 20 MIN: CPT | Performed by: PHYSICAL THERAPIST

## 2024-09-26 PROCEDURE — 97140 MANUAL THERAPY 1/> REGIONS: CPT | Performed by: PHYSICAL THERAPIST

## 2024-09-26 NOTE — PROGRESS NOTES
PT Evaluation     Today's date: 24  Patient name: Linette Davila  : 1960  MRN: [id number]  Referring provider: Dima Vides MD  Dx:   Encounter Diagnosis     ICD-10-CM    1. Lumbar facet arthropathy  M47.816       2. Chronic bilateral low back pain with bilateral sciatica  M54.42     M54.41     G89.29                       Assessment  Impairments: abnormal or restricted ROM, activity intolerance, impaired physical strength, lacks appropriate home exercise program, pain with function, poor posture  and poor body mechanics  Functional limitations: Pain transferring sit to stand, bed mobility, stationary standing, and lying supine.  Symptom irritability: high    Assessment details: Linette Davila is a 64 y.o. female referred with primary diagnosis of Lumbar facet arthropathy  (primary encounter diagnosis)  Chronic bilateral low back pain with bilateral sciatica .  Patient presents with the following functional limitations: Pain transferring sit to stand, bed mobility, stationary standing, and lying supine.  Patient demonstrates moderate limitations in lumbar ROM, decreased flexibility in bilateral LE musculature, and decreased segmental mobility of the lumbar spine.  Trial of CPA and Left UPA mobs at L4-5 reduced pain and feelings of tightness.  Treatment to include: Manual therapy techniques, extremity/core strengthening, neuromuscular control exercises, instruction in a comprehensive HEP, and modalities as needed.  They will benefit from skilled PT services to address the above functional deficits and to decrease pain to promote a return to their premorbid level of function.    Understanding of Dx/Px/POC: good     Prognosis: fair    Goals  STG (4 weeks)  1. Patient will demonstrate the ability to correct posture with minimal VC's  2. Patient will demonstrate 25% gains in lumbar ROM in all deficient planes  3. Patient will report pain as a 4-5/10 at worst with all normal activities  4.  Patient will report 50% reduction in sleep disturbances related to pain  LTG (8 weeks)  1. Patient will report pain as a 0-1/10 at worst with normal activities  2. Patient will sleep through the night without disturbances related to pain  3. Patient will demonstrate Lumbar ROM WFL to facilitate improved transfers  4. Patient will demonstrate core strength WNL to improve improve transfers, quality of gait, and ability to negotiate stairs.  5. Patient will be independent and compliant with a HEP in order to maintain gains made with skilled PT services.      Plan  Patient would benefit from: skilled physical therapy  Planned modality interventions: thermotherapy: hydrocollator packs, cryotherapy and electrical stimulation/Russian stimulation    Planned therapy interventions: abdominal trunk stabilization, joint mobilization, IASTM, manual therapy, neuromuscular re-education, patient education, strengthening, stretching, therapeutic activities, therapeutic exercise and home exercise program    Frequency: 2x week  Duration in weeks: 8  Plan of Care beginning date: 9/26/2024  Plan of Care expiration date: 11/21/2024  Treatment plan discussed with: patient    Subjective Evaluation    History of Present Illness  Mechanism of injury: Patient has a history of chronic bilateral lower back pain x 30 year, with more recent onset of paresthesias into bilateral anterior thigh and LE.  Patient was seen by Dr. Choudhary about 7 months ago and was referred to Physical Therapy which she did not attend.  Most recently seen by Pain Management and underwent  Diagnostic blockade of the medial branch nerves corresponding to the bilateral lumbar facet joints at the L4-5 and L5-S1 levels under fluoroscopic guidance on 8/27/24 with minimal relief of pain.  MRI ordered, but denied by insurance.  She was told she needed to do PT in order to have an MRI approved.   Referred now to outpatient PT services.  X-rays performed a year ago show,  "\"Moderate multilevel  disc space narrowing, subchondral sclerosis, and endplate spondylosis, consistent with degenerative disc disease. Moderate lower lumbar facet arthropathy.\"  Patient Goals  Patient goals for therapy: decreased pain  Patient goal: To get an MRI  Pain  Current pain ratin  At best pain ratin  At worst pain ratin  Location: Central lower back and occasionally into right lower back.  Darren. anterior thighs  Quality: sharp, radiating and burning  Alleviating factors: lumbar flexion.  SL.  Walking for a little bit.  Aggravating factors: standing (Standing stationary > 10 minutes.  Supine.  Lifting and mucking the swalls.  Sweeping the barn)  Progression: worsening    Social Support  Steps to enter house: yes (3 steps)  Stairs in house: yes (Basement 13 stairs.  Single HR.)   Lives in: one-story house  Lives with: spouse ([place])    Employment status: not working    Diagnostic Tests    FCE comments: TENS machine and back brace did not help with pain.  (+) intermittent sleep disturbances a few times a week.Treatments  Previous treatment: medication and injection treatment  Current treatment: medication      Objective     Concurrent Complaints  Positive for disturbed sleep. Negative for night pain, bladder dysfunction, bowel dysfunction, saddle (S4) numbness, history of cancer and history of trauma    Static Posture     Lumbar Spine   Flattened.     Postural Observations  Seated posture: fair  Standing posture: fair  Correction of posture: makes symptoms worse      Palpation   Left   Tenderness of the lumbar paraspinals.     Right   Tenderness of the lumbar paraspinals.     Tenderness     Lumbar Spine  Tenderness in the spinous process (L4-5) and facet joint (Left L4-5).     Left Hip   Tenderness in the PSIS.     Right Hip   Tenderness in the PSIS.     Neurological Testing     Sensation     Lumbar   Left   Intact: light touch    Right   Intact: light touch    Active Range of Motion     Lumbar "   Flexion:  with pain Restriction level: moderate  Extension:  with pain Restriction level: moderate  Left rotation:  WFL Restriction level: moderate  Right rotation:  with pain Restriction level: moderate    Joint Play     Hypomobile: L2, L3, L4 and L5     Pain: L4 and L5   Mechanical Assessment    Cervical      Thoracic      Lumbar    Standing flexion: repeated movements   Pain intensity: worse  Pain level: increased  Standing extension: repeated movements  Pain intensity: worse  Pain level: increased    Strength/Myotome Testing     Lumbar   Left   Normal strength    Right   Normal strength    Tests     Lumbar   Positive SIJ compression.   Negative prone instability , sacroiliac distraction and sacral spring .     Left   Negative passive SLR and slump test.     Right   Negative passive SLR and slump test.     Left Hip   Negative BAL, FADIR and long sit.     Right Hip   Positive BAL.   Negative FADIR and long sit.          Precautions: Depression, Anxiety, Tachycardia  Access Code [email or web address]   POC expires Unit limit Auth  expiration date PT/OT + Visit Limit?   11/21/24 NA 12/31/24 30                 Visit/Unit Tracking  AUTH Status:  Date 9/26              Authorized Used 1               Remaining  29                QR     Manuals 9/26            CPA mobs L4-5  Grade II-III JF            Left L4-5 UPA mobs Grade II-III JF                                      Neuro Re-Ed             Pt education L-spine anatomy/pathology.  Spinal stenosis.  Joint hypomobility            Webslide rows M,L             T-band Paloff Press             PPT             PPT with march             Bridges             SL clamshells hamilton             Ther Ex             Nustep with UE             Step HS stretch hamilton             Step hip flexor stretch hamilton Instructed            Prone quad stretch hamilton Instructed                                                                Ther Activity                                       Gait Training                                        Modalities

## 2024-09-26 NOTE — BH TREATMENT PLAN
"Outpatient Behavioral Health Psychotherapy Treatment Plan    Linette Davila  1960     Date of Initial Psychotherapy Assessment: 9/19/24   Date of Current Treatment Plan: 09/26/24  Treatment Plan Target Date: 3/26/25  Treatment Plan Expiration Date: 3/26/25    Diagnosis:   1. PROVISIONAL Body dysmorphic disorder        2. MDD (major depressive disorder), recurrent severe, without psychosis (HCC)        3. JACOBY (generalized anxiety disorder)        4. Panic disorder without agoraphobia            Area(s) of Need: anxiety, panic, body dysmorphia, depression, chronic pain    Long Term Goal 1 (in the client's own words): \"I need to work on the way I think, look at the world, stop worrying about every little thing\"    Stage of Change: Preparation    Target Date for completion: 3/26/25     Anticipated therapeutic modalities: CBT, client centered, medication management      People identified to complete this goal: Linette, writer therapist Cheri      Objective 1: (identify the means of measuring success in meeting the objective): Linette will identify 3 causes that lead her to feeling anxious and symptoms that occur resulting from the anxiety      Objective 2: (identify the means of measuring success in meeting the objective): Himanshus anxiety will decrease from a 10, to a 2, by applying learned tools such as deep breathing relaxation techniques, cognitive behavioral therapy, medication management and aversion         I am currently under the care of a [facility] psychiatric provider: yes    My [facility] psychiatric provider is: WILL Costa    I am currently taking psychiatric medications: Yes, as prescribed    I feel that I will be ready for discharge from mental health care when I reach the following (measurable goal/objective): When I learn how to manage the anxious thoughts    For children and adults who have a legal guardian:   Has there been any change to custody orders and/or guardianship status? NA. " If yes, attach updated documentation.    I have created my Crisis Plan and have been offered a copy of this plan    Behavioral Health Treatment Plan [facility]: Diagnosis and Treatment Plan explained to Linette Davila acknowledges an understanding of their diagnosis. Linette Davila agrees to this treatment plan.    I have been offered a copy of this Treatment Plan. yes

## 2024-09-26 NOTE — PSYCH
"Behavioral Health Psychotherapy Progress Note    Psychotherapy Provided: Individual Psychotherapy     1. PROVISIONAL Body dysmorphic disorder        2. MDD (major depressive disorder), recurrent severe, without psychosis (HCC)        3. JACOBY (generalized anxiety disorder)        4. Panic disorder without agoraphobia        5. Social anxiety disorder            Goals addressed in session: Goal 1     DATA: Ct and writer created treatment plan. Goals and objectives include identifying triggers that lead to anxiety and utilize learned techniques she is familiar with, to decrease level of anxiety. Ct explained she worries all the time and this is \"just the way it is\". She has tried ketamine therapy, she reports, but said this did not help. Ct reports having ongoing difficulty on a regular basis controlling her worry. Ct said she has social anxiety, however, once she is around people the anxiety decreases; she is unsure why.   Ct provided example of a negative thought, such as, she gets anxious when it is raining and worries about her horses getting and being uncomfortable. Writer asked ct to answer her questions of, \"what if?\" Ct responded, explained that if her horses were to get wet she would simply bring them into the barn. Ct reports projecting and catastrophizing often, and having little success with decreasing anxiety despite past treatments, therapy and learned techniques. Ct added that one of her biggest triggers is her chronic pain, which comes and goes.   Writer provided empathic listening, supportive feedback and encouragement to continue therapy in efforts to find what can help with decreasing anxiety. Ct appreciative.   Writer requested ct sign the treatment plan in [employer]. Ct understood.   discussed history of tried ketamine therapy but didn't help long term. Chronic pain difficult leads to anxiety drepssion  During this session, this clinician used the following therapeutic modalities: Engagement " "Strategies, Client-centered Therapy, Cognitive Behavioral Therapy, Solution-Focused Therapy, and Supportive Psychotherapy    Substance Abuse was not addressed during this session. If the client is diagnosed with a co-occurring substance use disorder, please indicate any changes in the frequency or amount of use:   . Stage of change for addressing substance use diagnoses: No substance use/Not applicable    ASSESSMENT:  Linette Davila presents with a Anxious mood.     her affect is Normal range and intensity, which is congruent, with her mood and the content of the session. The client has made progress on their goals. Ct identified what she has difficulty with and is open to working towards decreasing anxiety, through learned tools and attending therapy.        Linette Davila presents with a none risk of suicide, none risk of self-harm, and none risk of harm to others.    For any risk assessment that surpasses a \"low\" rating, a safety plan must be developed.    A safety plan was indicated: no  If yes, describe in detail       PLAN: Between sessions, Linette Davila will practice CBT to decrease negative thoughts. At the next session, the therapist will use Engagement Strategies, Client-centered Therapy, and Cognitive Behavioral Therapy to address anxiety.    Behavioral Health Treatment Plan and Discharge Planning: Linette Davila is aware of and agrees to continue to work on their treatment plan. They have identified and are working toward their discharge goals. yes    Visit start and stop times:    09/27/24    Start Time: 0903  Stop Time: 0948  Total Visit Time: 45 minutes    Virtual Regular Visit    Verification of patient location:    Patient is located at Home in the following state in which I hold an active license [place]      Assessment/Plan:    Problem List Items Addressed This Visit       JACOBY (generalized anxiety disorder)    MDD (major depressive disorder), recurrent severe, without psychosis " (HCC) (Chronic)    PROVISIONAL Body dysmorphic disorder - Primary    Panic disorder without agoraphobia    Social anxiety disorder       Goals addressed in session: Goal 1          Reason for visit is   Chief Complaint   Patient presents with    Virtual Regular Visit          Encounter provider SHAGGY Gomes      Recent Visits  Date Type Provider Dept   09/26/24 Telemedicine SHAGGY Gomes [facility] Therapist [place]   Showing recent visits within past 7 days and meeting all other requirements  Future Appointments  No visits were found meeting these conditions.  Showing future appointments within next 150 days and meeting all other requirements       The patient was identified by name and date of birth. Linette Davila was informed that this is a telemedicine visit and that the visit is being conducted throughthe Epic Embedded platform. She agrees to proceed..  My office door was closed. No one else was in the room.  She acknowledged consent and understanding of privacy and security of the video platform. The patient has agreed to participate and understands they can discontinue the visit at any time.    Patient is aware this is a billable service.     Subjective  Linette Davila is a 64 y.o. female    .      HPI     Past Medical History:   Diagnosis Date    Anxiety     Depression 1987    SVT (supraventricular tachycardia)     Varicella 1987       Past Surgical History:   Procedure Laterality Date    CARDIAC ELECTROPHYSIOLOGY PROCEDURE N/A 05/30/2023    Procedure: Cardiac eps/svt ablation;  Surgeon: Jigar Roe MD;  [facility];  Service: Cardiology    JOINT REPLACEMENT  2010    right hip       Current Outpatient Medications   Medication Sig Dispense Refill    clonazePAM (KlonoPIN) 1 mg tablet Take 1 tablet (1 mg total) by mouth 3 (three) times a day 90 tablet 0    dicyclomine (BENTYL) 10 mg capsule Take 1 capsule (10 mg total) by mouth 4 (four) times a day  (before meals and at bedtime) 120 capsule 5    gabapentin (Neurontin) 300 mg capsule Take 1 capsule (300 mg total) by mouth 2 (two) times a day 60 capsule 1    omeprazole (PriLOSEC) 40 MG capsule Take 1 capsule (40 mg total) by mouth 2 (two) times a day 200 capsule 1    QUEtiapine (SEROquel) 100 mg tablet Take 1 tablet (100 mg total) by mouth 3 (three) times a day 90 tablet 0    sertraline (ZOLOFT) 100 mg tablet Take 1 tablet (100 mg total) by mouth daily 90 tablet 0     No current facility-administered medications for this visit.        No Known Allergies    Review of Systems    Video Exam    There were no vitals filed for this visit.    Physical Exam     Visit Time    Visit Start Time: 903  Visit Stop Time: 948  Total Visit Duration:  45 minutes

## 2024-09-26 NOTE — BH CRISIS PLAN
Client Name: Linette Davila       Client YOB: 1960    Angel Safety Plan         Step 1: Warning Signs:   Catastrophic thinking, extreme anxiety/worry, lack of hope         Step 2: Internal Coping Strategies:   Deep breathing, changing distorted, irrational thoughts         Step 3: People and social settings that provide distraction:       , support system at home, spending time with horses     Step 4: People whom I can ask for help during a crisis:      Step 5: Professionals or agencies I can contact during a crisis:   [facility], psychiatric associates     Crisis Phone Numbers:   Suicide Prevention Lifeline: Call or Text  988 Crisis Text Line: Text HOME to 140-997   Please note: Some [facility] do not have a separate number for Child/Adolescent specific crisis. If your county is not listed under Child/Adolescent, please call the adult number for your county      Adult Crisis Numbers: Child/Adolescent Crisis Numbers   [facility]: [phone] [facility]: [phone]   [facility]: [phone] [facility]: [phone]   [facility]: [phone] [place]: [phone]   [facility]: [phone] [facility]: [phone]   [facility]: [phone]   [facility]: [phone]   [facility]: [phone]   [facility]: [phone] (daytime) [phone] (after hours, weekends, holidays)      Step 6: Making the environment safer (plan for lethal means safety):    No access to any  Optional: What is most important to me and worth living for?    My   Angel Safety Plan. Saskia Wooten and Akbar Hong. Used with permission of the authors.

## 2024-10-01 ENCOUNTER — OFFICE VISIT (OUTPATIENT)
Dept: PHYSICAL THERAPY | Facility: CLINIC | Age: 64
End: 2024-10-01
Payer: COMMERCIAL

## 2024-10-01 DIAGNOSIS — M54.41 CHRONIC BILATERAL LOW BACK PAIN WITH BILATERAL SCIATICA: ICD-10-CM

## 2024-10-01 DIAGNOSIS — G89.29 CHRONIC BILATERAL LOW BACK PAIN WITH BILATERAL SCIATICA: ICD-10-CM

## 2024-10-01 DIAGNOSIS — M47.816 LUMBAR FACET ARTHROPATHY: Primary | ICD-10-CM

## 2024-10-01 DIAGNOSIS — M54.42 CHRONIC BILATERAL LOW BACK PAIN WITH BILATERAL SCIATICA: ICD-10-CM

## 2024-10-01 PROCEDURE — 97140 MANUAL THERAPY 1/> REGIONS: CPT | Performed by: PHYSICAL THERAPIST

## 2024-10-01 PROCEDURE — 97110 THERAPEUTIC EXERCISES: CPT | Performed by: PHYSICAL THERAPIST

## 2024-10-01 PROCEDURE — 97112 NEUROMUSCULAR REEDUCATION: CPT | Performed by: PHYSICAL THERAPIST

## 2024-10-01 NOTE — PROGRESS NOTES
"Daily Note     Today's date: 10/1/2024  Patient name: Linette Davila  : 1960  MRN: [id number]  Referring provider: Dima Vides MD  Dx:   Encounter Diagnosis     ICD-10-CM    1. Lumbar facet arthropathy  M47.816       2. Chronic bilateral low back pain with bilateral sciatica  M54.42     M54.41     G89.29                      Subjective: Patient reports feeling better for a few hours after her last PT session.  She felt that the mobilizations were very helpful.      Objective: See treatment diary below      Assessment: Tolerated treatment well. Patient demonstrated fatigue post treatment and would benefit from continued PT.  New TE added today for core strengthening.  VC's for correct performance of TE.  Patient reports feeling much better after manual therapy techniques..  Provided with Blue T-band for home use.      Plan: Continue per plan of care.      Precautions: Depression, Anxiety, Tachycardia  Access Code [email or web address]   POC expires Unit limit Auth  expiration date PT/OT + Visit Limit?   24 NA 24 30                 Visit/Unit Tracking  AUTH Status:  Date 9/26 10/1             Authorized Used 1 2                Remaining  29 28               QR     Manuals 9/26 10/1           CPA mobs L4-5  Grade II-III JF Grade III-IV JF           Left L4-5 UPA mobs Grade II-III JF Grade III-IV JF                                     Neuro Re-Ed             Pt education L-spine anatomy/pathology.  Spinal stenosis.  Joint hypomobility            Webslide rows M,L  BTB 3\" 2x10           T-band Paloff Press  BTB 10\"x10           PPT             PPT with march             Bridges  3\"2x10           SL clamshells hamilton  3\"x20 hamilton           Ther Ex             Nustep seat 10 with UE  L7x10'           Step HS stretch hamilton             Step hip flexor stretch hamilton Instructed            Prone quad stretch hamilton Instructed                                                                Ther Activity                        "                Gait Training                                       Modalities

## 2024-10-03 ENCOUNTER — TELEMEDICINE (OUTPATIENT)
Dept: BEHAVIORAL/MENTAL HEALTH CLINIC | Facility: CLINIC | Age: 64
End: 2024-10-03
Payer: COMMERCIAL

## 2024-10-03 DIAGNOSIS — F40.10 SOCIAL ANXIETY DISORDER: Primary | ICD-10-CM

## 2024-10-03 DIAGNOSIS — F33.2 MDD (MAJOR DEPRESSIVE DISORDER), RECURRENT SEVERE, WITHOUT PSYCHOSIS (HCC): Chronic | ICD-10-CM

## 2024-10-03 DIAGNOSIS — F45.22 BODY DYSMORPHIC DISORDER: ICD-10-CM

## 2024-10-03 DIAGNOSIS — F41.0 PANIC DISORDER WITHOUT AGORAPHOBIA: ICD-10-CM

## 2024-10-03 PROCEDURE — 90834 PSYTX W PT 45 MINUTES: CPT

## 2024-10-03 NOTE — PSYCH
"Behavioral Health Psychotherapy Progress Note    Psychotherapy Provided: Individual Psychotherapy     1. Social anxiety disorder        2. PROVISIONAL Body dysmorphic disorder        3. MDD (major depressive disorder), recurrent severe, without psychosis (HCC)        4. Panic disorder without agoraphobia            Goals addressed in session: Goal 1     DATA: Alexa reports she has been feeling anxious since last week. Writer followed up on the plan in place following last session, that ct said she would work on to help with the anxiety. Alexa said she as been trying to get out of house, which is helpful. Ct reports chronic back pain, which makes her anxiety work. Ct added that the physical therapy does not help but rather makes her back pain worse However, because insurance requires her to do PT before getting an MRI, she will do the PT. Ct reports seeing a doctor prior to PT, then went to pain management clinic, then from there went to PT. At this time she is in the midst of trying to find out what to do to resolve her chronic pain.     Pt again reports that she woke up with feelings of her \"stomach being on fire\", due to anxiety. Ct said she takes medications for the pain which helps a little bit. Writer inquired about what ct has done before, when in therapy prior to this, that has helped manage the anxiety. Ct reports trying \"everything\". This includes deep breathing, meditation, imagery, cognitive behavioral therapy; none of these help her anxiety, she reports. Writer inquired if ct tried doing these things consistently, stressing that practice is important in order to see and feel changes. Ct reports that she jsut didn't like these exercises and \"I really try but its hard to stay focused?.  She says that medication she is on does help with the anxiety, but nothing helps for the pain, and the pain is what triggers the anxiety - it is a cycle.Medication helps for anxiety.     We spoke more about the physical pain. " "Ct reports she used to have flare ups and get oxycodone but can't anymore (due to regulations in medicine). Per ct, the Oxy helped anxiety , too. Writer asked ct to rate anxiety, today, on a scale from 1-10. Ct reports that today anxiety is an 8. Writer inquired as to what leads it to be at this level? Ct again reports that when she wakes up with stomach pain, it causes anxiety. In addition to this, ct identified fears, which are another cause of her anxiety. Specifically, fear of gettign older, her \"body breaking down\", and \"what ifs\" . Ct reports she is always in fear, it's always a cycle.     Writer asked ct about CBT again, specifically, what ct did - such as, identifying negative thoughts, seeing patterns in thoughts, answering her \"what its?\", noticing what is rational versus irrational. Ct said overall it was \"okay\" but in the long run, wasn't useful, as her thoughts are \"hard to control\" and she \"just wants to get out of her head\". Writer asked if ct had tried focusing on her body, rather than staying in her mind. Ct responded, stating she does not want to focus on the body, because she then focuses on the apin. Ct reports feeling frustrated that she can't do what she used to (physically, like exercise and horseback riding). Ct added she is \"so determined to fix this\".     Writer then spoek with ct about grief and acceptance, explained that perhaps she is feeling grief over losing what she used to have, and having difficulty accepting things like the chronic pain, which she can only control to a certain extent. Ct said she liked the idea of working on acceptance, this is new to her. Although, she then stated, \"If I can't fix it so I get anxious and depressed. I try to get out of my head but it's just there.\" Writer asked how long ct has tried to sit with her anxiety, and pain, and how long does it last? Ct said \"I'm anxious and depressed all day, and right now nothing makes me happy\"/ How long does it last " "when you sit with it? I'm anxous and depressed all day.     Writer asked ct, \"What do you feeel you can get out of therapy? \" Ct responded, \"Ways to relieve anxiety and thoughts patterns.\" Writer explained that last session and today, we have discussed suggestions, but ct said, \"they're not solutions; first I have to deal with my thoughts, they're just there; it takes over my brain and my life\". Writer then suggested worksheets as something to try. Ct said she will not use worksheets.      Writer asked ct, What else would you like to talk about besides anxiety? Ct was quiet most of the session, only spoke when asked questions by writer. Ct explained that   She is aware she verbalizes the way she feels and realizes that she has to become \"more active\" to decrease anxiety. Ct states that when she is more active and engaged with others she feels more relaxed as her focus is off of self and she is not stuck in her head. Ct explained that \"doctors say I'm treatmaent resistant because all medications I try stops helping:. Ct reports she tried medical marijuana but that made her sick    Writer suggested CBT worksheets, as another option to address anxiety. Ct declined, said she would not feel like doing these, does not like CBT so much, as previously stated. We then talked about using acceptance as a means to let go of things out of her control. Ct said \"that's a good thought, in order to stop fighting my anxiety\" but added  \"I wont accept the pain because I know there are so many different options. I wont live like this; theres no reason for it. Doctors can blow me off, but I will fight til the end.\"     During this session, this clinician used the following therapeutic modalities: Engagement Strategies, Client-centered Therapy, Cognitive Behavioral Therapy, Motivational Interviewing, Solution-Focused Therapy, and Supportive Psychotherapy    Substance Abuse was not addressed during this session. If the client is diagnosed " "with a co-occurring substance use disorder, please indicate any changes in the frequency or amount of use:   . Stage of change for addressing substance use diagnoses: No substance use/Not applicable    ASSESSMENT:  Linette Davila presents with a Anxious mood.     her affect is Flat, which is congruent, with her mood and the content of the session. The client has not made progress on their goals. Ct resistant to working on objectives. Ct reports hoping that coming to therapy will provide solutions to how to decrease and work through anxiety, however, ct not willing to do work in between sessions.      Linette Davila presents with a none risk of suicide, none risk of self-harm, and none risk of harm to others.    For any risk assessment that surpasses a \"low\" rating, a safety plan must be developed.    A safety plan was indicated: no  If yes, describe in detail       PLAN: Between sessions, iLnette Davila will reflect upon acceptance of what she cannot control. At the next session, the therapist will use Engagement Strategies and Cognitive Behavioral Therapy to address goals and objectives, motivation to work towards decreasing anxiety.    Behavioral Health Treatment Plan and Discharge Planning: Linette Davila is aware of but does not agree to continue to work on their treatment plan. They have identified and are working toward their discharge goals:  no    Visit start and stop times:    10/04/24    Start Time: 0900  Stop Time: 0945  Total Visit Time: 45 minutes    Virtual Regular Visit    Verification of patient location:    Patient is located at Home in the following state in which I hold an active license [place]      Assessment/Plan:    Problem List Items Addressed This Visit       MDD (major depressive disorder), recurrent severe, without psychosis (HCC) (Chronic)    PROVISIONAL Body dysmorphic disorder    Panic disorder without agoraphobia    Social anxiety disorder - Primary       Goals addressed " in session: Goal 1          Reason for visit is   Chief Complaint   Patient presents with    Virtual Regular Visit          Encounter provider SHAGGY Gomes      Recent Visits  Date Type Provider Dept   10/03/24 Telemedicine SHAGGY Gomes [facility] Therapist [place]   Showing recent visits within past 7 days and meeting all other requirements  Future Appointments  No visits were found meeting these conditions.  Showing future appointments within next 150 days and meeting all other requirements       The patient was identified by name and date of birth. Linette Davila was informed that this is a telemedicine visit and that the visit is being conducted throughthe Epic Embedded platform. She agrees to proceed..  My office door was closed. No one else was in the room.  She acknowledged consent and understanding of privacy and security of the video platform. The patient has agreed to participate and understands they can discontinue the visit at any time.    Patient is aware this is a billable service.     Subjective  Linette Davila is a 64 y.o. female    .      HPI     Past Medical History:   Diagnosis Date    Anxiety     Depression 1987    SVT (supraventricular tachycardia) (HCC)     Varicella 1987       Past Surgical History:   Procedure Laterality Date    CARDIAC ELECTROPHYSIOLOGY PROCEDURE N/A 05/30/2023    Procedure: Cardiac eps/svt ablation;  Surgeon: Jigar Roe MD;  [facility];  Service: Cardiology    JOINT REPLACEMENT  2010    right hip       Current Outpatient Medications   Medication Sig Dispense Refill    methocarbamol (ROBAXIN) 500 mg tablet Take 1 tablet (500 mg total) by mouth 2 (two) times a day as needed for muscle spasms 30 tablet 0    clonazePAM (KlonoPIN) 1 mg tablet Take 1 tablet (1 mg total) by mouth 3 (three) times a day 90 tablet 0    dicyclomine (BENTYL) 10 mg capsule Take 1 capsule (10 mg total) by mouth 4 (four) times a day (before  meals and at bedtime) 120 capsule 5    gabapentin (Neurontin) 300 mg capsule Take 1 capsule (300 mg total) by mouth 2 (two) times a day 60 capsule 1    omeprazole (PriLOSEC) 40 MG capsule Take 1 capsule (40 mg total) by mouth 2 (two) times a day 200 capsule 1    QUEtiapine (SEROquel) 100 mg tablet Take 1 tablet (100 mg total) by mouth 3 (three) times a day 90 tablet 0    sertraline (ZOLOFT) 100 mg tablet Take 1 tablet (100 mg total) by mouth daily 90 tablet 0     No current facility-administered medications for this visit.        No Known Allergies    Review of Systems    Video Exam    There were no vitals filed for this visit.    Physical Exam     Visit Time    Visit Start Time: 0900  Visit Stop Time: 0945  Total Visit Duration:  45 minutes

## 2024-10-04 ENCOUNTER — OFFICE VISIT (OUTPATIENT)
Dept: PHYSICAL THERAPY | Facility: CLINIC | Age: 64
End: 2024-10-04
Payer: COMMERCIAL

## 2024-10-04 DIAGNOSIS — G89.29 CHRONIC BILATERAL LOW BACK PAIN WITH BILATERAL SCIATICA: ICD-10-CM

## 2024-10-04 DIAGNOSIS — G89.29 CHRONIC BILATERAL LOW BACK PAIN, UNSPECIFIED WHETHER SCIATICA PRESENT: Primary | ICD-10-CM

## 2024-10-04 DIAGNOSIS — M54.41 CHRONIC BILATERAL LOW BACK PAIN WITH BILATERAL SCIATICA: ICD-10-CM

## 2024-10-04 DIAGNOSIS — M54.50 CHRONIC BILATERAL LOW BACK PAIN, UNSPECIFIED WHETHER SCIATICA PRESENT: Primary | ICD-10-CM

## 2024-10-04 DIAGNOSIS — M47.816 LUMBAR FACET ARTHROPATHY: Primary | ICD-10-CM

## 2024-10-04 DIAGNOSIS — M54.42 CHRONIC BILATERAL LOW BACK PAIN WITH BILATERAL SCIATICA: ICD-10-CM

## 2024-10-04 PROCEDURE — 97110 THERAPEUTIC EXERCISES: CPT

## 2024-10-04 PROCEDURE — 97112 NEUROMUSCULAR REEDUCATION: CPT

## 2024-10-04 PROCEDURE — 97140 MANUAL THERAPY 1/> REGIONS: CPT | Performed by: PHYSICAL THERAPIST

## 2024-10-04 RX ORDER — METHOCARBAMOL 500 MG/1
500 TABLET, FILM COATED ORAL 2 TIMES DAILY PRN
Qty: 30 TABLET | Refills: 0 | Status: SHIPPED | OUTPATIENT
Start: 2024-10-04

## 2024-10-04 NOTE — PROGRESS NOTES
"Daily Note     Today's date: 10/4/2024  Patient name: Linette Davila  : 1960  MRN: [id number]  Referring provider: Dima Vides MD  Dx:   Encounter Diagnosis     ICD-10-CM    1. Lumbar facet arthropathy  M47.816       2. Chronic bilateral low back pain with bilateral sciatica  M54.42     M54.41     G89.29           Start Time: 1021  Stop Time: 1112  Total time in clinic (min): 51 minutes    Subjective: Pt noted that she has worse pain in her LB as well as her L knee s/p last treatment. She believes that this pain may have been caused by the s/l clamshells.       Objective: See treatment diary below      Assessment:  Continued with treatment session with modifications made due to having increase pain after last session. Overall was able to complete without exacerbations. Noted some + result from manuals completed by primary therapist. Tolerated treatment well. Patient exhibited good technique with therapeutic exercises and would benefit from continued PT  DOMS may be noted at the end of treatment to 48 hours after.     Plan: Continue per plan of care.      Precautions: Depression, Anxiety, Tachycardia  Access Code [email or web address]   POC expires Unit limit Auth  expiration date PT/OT + Visit Limit?   24 NA 24 30                 Visit/Unit Tracking  AUTH Status:  Date 9/26 10/1 10/4            Authorized Used 1 2 3               Remaining  29 28               QR     Manuals 9/26 10/1 10/4          CPA mobs L4-5  Grade II-III JF Grade III-IV JF Grade III-IV JF          Left L4-5 UPA mobs Grade II-III JF Grade III-IV JF Grade III-IV JF                                    Neuro Re-Ed             Pt education L-spine anatomy/pathology.  Spinal stenosis.  Joint hypomobility            Webslide rows M,L  BTB 3\" 2x10 BTB 3x 10           T-band Paloff Press  BTB 10\"x10 NV          PPT             PPT with march   2x 10           Bridges  3\"2x10 3\" 2x 10           SL clamshells hamilton  3\"x20 hamilton D/C / hold        "                                           Ther Ex             Nustep seat 10 with UE  L7x10' L1 10 min with UE at 9           Step HS stretch hamilton             Step hip flexor stretch hamilton Instructed            Prone quad stretch hamilton Instructed                                                                Ther Activity                                       Gait Training                                       Modalities

## 2024-10-07 DIAGNOSIS — F41.1 GAD (GENERALIZED ANXIETY DISORDER): ICD-10-CM

## 2024-10-07 RX ORDER — CLONAZEPAM 1 MG/1
1 TABLET ORAL 3 TIMES DAILY
Qty: 90 TABLET | Refills: 0 | Status: SHIPPED | OUTPATIENT
Start: 2024-10-07 | End: 2024-11-06

## 2024-10-07 NOTE — PROGRESS NOTES
"Daily Note     Today's date: 10/7/2024  Patient name: Linette Davila  : 1960  MRN: [id number]  Referring provider: Dima Vides MD  Dx:   Encounter Diagnosis     ICD-10-CM    1. Lumbar facet arthropathy  M47.816       2. Chronic bilateral low back pain with bilateral sciatica  M54.42     M54.41     G89.29                      Subjective: Patient states she gets relief for a few hours after PT session, but pain returns after that.  Started taking Gabapentin which has helped somewhat with her pain.  Was also prescribed Robaxin, but hasn't started taking that yet.      Objective: See treatment diary below      Assessment: Tolerated treatment well. Patient demonstrated fatigue post treatment and would benefit from continued PT.  Patient reports feeling better after manual techniques.  Still a little achy, but not as bad as when she came in.      Plan: Continue per plan of care.  Progress treatment as tolerated.       Precautions: Depression, Anxiety, Tachycardia  Access Code [email or web address]   POC expires Unit limit Auth  expiration date PT/OT + Visit Limit?   24 NA 24 30                 Visit/Unit Tracking  AUTH Status:  Date 9/26 10/1 10/4 10/8           Authorized Used 1 2 3 4              Remaining  29 28 27 26             QR     Manuals 9/26 10/1 10/4 10/8         CPA mobs L4-5  Grade II-III JF Grade III-IV JF Grade III-IV JF Grade III-IV JF         Left L4-5 UPA mobs Grade II-III JF Grade III-IV JF Grade III-IV JF Grade III-IV JF                                   Neuro Re-Ed             Pt education L-spine anatomy/pathology.  Spinal stenosis.  Joint hypomobility            Webslide rows M,L  BTB 3\" 2x10 BTB 3x 10  BTB 3x 10          T-band Paloff Press  BTB 10\"x10 NV BTB 10\"x10         PPT             PPT with march   2x 10  2x 10          Bridges  3\"2x10 3\" 2x 10  3\" 2x 10          SL clamshells hamilton  3\"x20 hamilton D/C / hold                                                  Ther Ex           " "  Nustep seat 10 with UE  L7x10' L1 10 min with UE at 9  L6x10'         Step HS stretch hamilton    3x30\"         Step hip flexor stretch hamilton Instructed            Prone quad stretch hamilton Instructed                                                                Ther Activity                                       Gait Training                                       Modalities                                                "

## 2024-10-07 NOTE — TELEPHONE ENCOUNTER
Reason for call:   [x] Refill   [] Prior Auth  [] Other:     Office:   [x] Specialty/Provider - psych / [facility]    Medication: clonazepam    Dose/Frequency: 1mg tid    Quantity: 90    Pharmacy: [id number] - [place] - [address] [place] [address]     Does the patient have enough for 3 days?   [x] Yes   [] No - Send as HP to POD

## 2024-10-07 NOTE — TELEPHONE ENCOUNTER
Medication:  PDMP  09/06/2024 09/06/2024 clonazePAM (Tablet) 90.0 30 1 MG NA NABIL DEL [facility] Commercial Insurance 0 / 0 PA   1 [id number] 08/06/2024 08/06/2024 clonazePAM (Tablet) 90.0 30 1 [facility] 0 / 0 PA   1 [id number] 07/05/2024 07/05/2024 clonazePAM (Tablet) 90.0 30 1 MG [facility]  Active agreement on file -

## 2024-10-08 ENCOUNTER — OFFICE VISIT (OUTPATIENT)
Dept: PHYSICAL THERAPY | Facility: CLINIC | Age: 64
End: 2024-10-08
Payer: COMMERCIAL

## 2024-10-08 DIAGNOSIS — M47.816 LUMBAR FACET ARTHROPATHY: Primary | ICD-10-CM

## 2024-10-08 DIAGNOSIS — G89.29 CHRONIC BILATERAL LOW BACK PAIN WITH BILATERAL SCIATICA: ICD-10-CM

## 2024-10-08 DIAGNOSIS — M54.42 CHRONIC BILATERAL LOW BACK PAIN WITH BILATERAL SCIATICA: ICD-10-CM

## 2024-10-08 DIAGNOSIS — M54.41 CHRONIC BILATERAL LOW BACK PAIN WITH BILATERAL SCIATICA: ICD-10-CM

## 2024-10-08 PROCEDURE — 97140 MANUAL THERAPY 1/> REGIONS: CPT | Performed by: PHYSICAL THERAPIST

## 2024-10-08 PROCEDURE — 97112 NEUROMUSCULAR REEDUCATION: CPT | Performed by: PHYSICAL THERAPIST

## 2024-10-08 PROCEDURE — 97110 THERAPEUTIC EXERCISES: CPT | Performed by: PHYSICAL THERAPIST

## 2024-10-10 ENCOUNTER — APPOINTMENT (OUTPATIENT)
Dept: PHYSICAL THERAPY | Facility: CLINIC | Age: 64
End: 2024-10-10
Payer: COMMERCIAL

## 2024-10-11 ENCOUNTER — OFFICE VISIT (OUTPATIENT)
Dept: PHYSICAL THERAPY | Facility: CLINIC | Age: 64
End: 2024-10-11
Payer: COMMERCIAL

## 2024-10-11 DIAGNOSIS — M54.42 CHRONIC BILATERAL LOW BACK PAIN WITH BILATERAL SCIATICA: ICD-10-CM

## 2024-10-11 DIAGNOSIS — M47.816 LUMBAR FACET ARTHROPATHY: Primary | ICD-10-CM

## 2024-10-11 DIAGNOSIS — G89.29 CHRONIC BILATERAL LOW BACK PAIN WITH BILATERAL SCIATICA: ICD-10-CM

## 2024-10-11 DIAGNOSIS — M54.41 CHRONIC BILATERAL LOW BACK PAIN WITH BILATERAL SCIATICA: ICD-10-CM

## 2024-10-11 PROCEDURE — 97110 THERAPEUTIC EXERCISES: CPT

## 2024-10-11 PROCEDURE — 97112 NEUROMUSCULAR REEDUCATION: CPT

## 2024-10-11 PROCEDURE — 97140 MANUAL THERAPY 1/> REGIONS: CPT

## 2024-10-11 NOTE — PROGRESS NOTES
"Daily Note    Today's date: 10/11/24  Patient name: Linette Davila  : 1960  MRN: [id number]  Referring provider: Dima Vides MD  Dx:   Encounter Diagnosis     ICD-10-CM    1. Lumbar facet arthropathy  M47.816       2. Chronic bilateral low back pain with bilateral sciatica  M54.42     M54.41     G89.29           Start Time: 1445  Stop Time: 1530  Total time in clinic (min): 45 minutes      Subjective: Linette reports soreness in the low back today.    Objective: See treatment diary below.    Assessment: Linette tolerated treatment fair with consistent cuing throughout. TE's were performed with the same resistance and reps. No new TE's were performed today. Following treatment, the patient demonstrated fatigue and would benefit from continued physical therapy.    Plan: Continue per plan of care.        Precautions: Depression, Anxiety, Tachycardia  Access Code [email or web address]   POC expires Unit limit Auth  expiration date PT/OT + Visit Limit?   24 NA 24 30                 Visit/Unit Tracking  AUTH Status:  Date 9/26 10/1 10/4 10/8 10/11          Authorized Used 1 2 3 4 5             Remaining  29 28 27 26 25            QR     Manuals 9/26 10/1 10/4 10/8 10/11        CPA mobs L4-5  Grade II-III JF Grade III-IV JF Grade III-IV JF Grade III-IV JF Grade III-IV TS        Left L4-5 UPA mobs Grade II-III JF Grade III-IV JF Grade III-IV JF Grade III-IV JF Grade III-IV TS                                  Neuro Re-Ed             Pt education L-spine anatomy/pathology.  Spinal stenosis.  Joint hypomobility            Webslide rows M,L  BTB 3\" 2x10 BTB 3x 10  BTB 3x 10  BTB 3x10         T-band Paloff Press  BTB 10\"x10 NV BTB 10\"x10 BTB 10x10\"         PPT             PPT with march   2x 10  2x 10          Bridges  3\"2x10 3\" 2x 10  3\" 2x 10          SL clamshells hamilton  3\"x20 hamilton D/C / hold                                                  Ther Ex             Nustep seat 10 with UE  L7x10' L1 10 min with UE at 9  " "L6x10' 10' L6         Step HS stretch hamilton    3x30\" 10x10\"         Step hip flexor stretch hamilton Instructed    10x10\"         Prone quad stretch hamilton Instructed                                                                Ther Activity                                       Gait Training                                       Modalities                                                  José Hendrickson, PT  10/11/2024,3:17 PM  "

## 2024-10-14 ENCOUNTER — TELEPHONE (OUTPATIENT)
Dept: PAIN MEDICINE | Facility: CLINIC | Age: 64
End: 2024-10-14

## 2024-10-14 NOTE — PROGRESS NOTES
"Daily Note     Today's date: 10/14/2024  Patient name: Linette Davila  : 1960  MRN: [id number]  Referring provider: Dima Vides MD  Dx:   Encounter Diagnosis     ICD-10-CM    1. Lumbar facet arthropathy  M47.816       2. Chronic bilateral low back pain with bilateral sciatica  M54.42     M54.41     G89.29                      Subjective: Patient states she had a lot of pain after her last PT session.  She reports not getting relief from the joint mobilizations like she previously did.  Has been communicating with her Pain Management office and Insurance Company to try and get her MRI approved.  Currently awaiting approval.        Objective: See treatment diary below      Assessment: Tolerated treatment well. Patient demonstrated fatigue post treatment and would benefit from continued PT.  Patient reports significantly decreased pain after manual therapy techniques today.  Held some TE today due to increased pain levels after last session.      Plan: Continue per plan of care.  Progress treatment as tolerated.       Precautions: Depression, Anxiety, Tachycardia  Access Code [email or web address]   POC expires Unit limit Auth  expiration date PT/OT + Visit Limit?   24 NA 24 30                 Visit/Unit Tracking  AUTH Status:  Date 9/26 10/1 10/4 10/8 10/11 10/15         Authorized Used 1 2 3 4 5 6            Remaining  29 28 27 26 25 24           QR     Manuals 9/26 10/1 10/4 10/8 10/11 10/15       CPA mobs L4-5  Grade II-III JF Grade III-IV JF Grade III-IV JF Grade III-IV JF Grade III-IV TS Grade III-IV JF       Left L4-5 UPA mobs Grade II-III JF Grade III-IV JF Grade III-IV JF Grade III-IV JF Grade III-IV TS Grade III-IV JF                                 Neuro Re-Ed             Pt education L-spine anatomy/pathology.  Spinal stenosis.  Joint hypomobility            Webslide rows M,L  BTB 3\" 2x10 BTB 3x 10  BTB 3x 10  BTB 3x10  BTB 3x10        T-band Paloff Press  BTB 10\"x10 NV BTB 10\"x10 BTB 10x10\"  " "BTB 10x10\"        PPT             PPT with march   2x 10  2x 10          Bridges  3\"2x10 3\" 2x 10  3\" 2x 10          SL clamshells hamilton  3\"x20 hamilton D/C / hold                                                  Ther Ex             Nustep seat 10 with UE  L7x10' L1 10 min with UE at 9  L6x10' 10' L6  L5x10'       Step HS stretch hamilton    3x30\" 10x10\"  10x10\"        Step hip flexor stretch hamilton Instructed    10x10\"  Held today       Prone quad stretch hamilton Instructed                                                                Ther Activity                                       Gait Training                                       Modalities                                                    "

## 2024-10-14 NOTE — TELEPHONE ENCOUNTER
Would this still be eligible for peer to peer?  Can it be resubmitted that PT is worsening pain?  MRI is scheduled 11/7

## 2024-10-14 NOTE — TELEPHONE ENCOUNTER
Caller: Carlos    Doctor: Peewee    Reason for call: patient states PT isn't helping she she has tried several times, also any medication longer than 6 weeks like Naproxen that may help get MRI approved if they know what medication she has tried? Patient pain is increasing?   Maybe a peer to peer ?    Please advise     Call back#:     Reconsideration case # [id number]  Fax# [phone]

## 2024-10-14 NOTE — TELEPHONE ENCOUNTER
Patient  came into the office today to see how far in the process the MRI resubmission was. The office messaged the [facility] clinical pool and they messaged the MRI auth team. Patients'  was made aware and he will follow up via [employer] messages. He will call Carlos later today to track the progress.

## 2024-10-15 ENCOUNTER — OFFICE VISIT (OUTPATIENT)
Dept: PHYSICAL THERAPY | Facility: CLINIC | Age: 64
End: 2024-10-15
Payer: COMMERCIAL

## 2024-10-15 DIAGNOSIS — M47.816 LUMBAR FACET ARTHROPATHY: Primary | ICD-10-CM

## 2024-10-15 DIAGNOSIS — G89.29 CHRONIC BILATERAL LOW BACK PAIN WITH BILATERAL SCIATICA: ICD-10-CM

## 2024-10-15 DIAGNOSIS — M54.41 CHRONIC BILATERAL LOW BACK PAIN WITH BILATERAL SCIATICA: ICD-10-CM

## 2024-10-15 DIAGNOSIS — M54.42 CHRONIC BILATERAL LOW BACK PAIN WITH BILATERAL SCIATICA: ICD-10-CM

## 2024-10-15 PROCEDURE — 97140 MANUAL THERAPY 1/> REGIONS: CPT | Performed by: PHYSICAL THERAPIST

## 2024-10-15 PROCEDURE — 97112 NEUROMUSCULAR REEDUCATION: CPT | Performed by: PHYSICAL THERAPIST

## 2024-10-15 PROCEDURE — 97110 THERAPEUTIC EXERCISES: CPT | Performed by: PHYSICAL THERAPIST

## 2024-10-17 ENCOUNTER — APPOINTMENT (OUTPATIENT)
Dept: PHYSICAL THERAPY | Facility: CLINIC | Age: 64
End: 2024-10-17
Payer: COMMERCIAL

## 2024-10-22 ENCOUNTER — APPOINTMENT (OUTPATIENT)
Dept: PHYSICAL THERAPY | Facility: CLINIC | Age: 64
End: 2024-10-22
Payer: COMMERCIAL

## 2024-10-24 ENCOUNTER — APPOINTMENT (OUTPATIENT)
Dept: PHYSICAL THERAPY | Facility: CLINIC | Age: 64
End: 2024-10-24
Payer: COMMERCIAL

## 2024-10-29 ENCOUNTER — APPOINTMENT (OUTPATIENT)
Dept: PHYSICAL THERAPY | Facility: CLINIC | Age: 64
End: 2024-10-29
Payer: COMMERCIAL

## 2024-10-31 ENCOUNTER — APPOINTMENT (OUTPATIENT)
Dept: PHYSICAL THERAPY | Facility: CLINIC | Age: 64
End: 2024-10-31
Payer: COMMERCIAL

## 2024-10-31 ENCOUNTER — TELEPHONE (OUTPATIENT)
Age: 64
End: 2024-10-31

## 2024-10-31 NOTE — TELEPHONE ENCOUNTER
Patient called to verify when her next appt with Jabier Wilson was . Writer was able to verify this information for the patient.

## 2024-11-05 DIAGNOSIS — F41.1 GAD (GENERALIZED ANXIETY DISORDER): ICD-10-CM

## 2024-11-05 NOTE — TELEPHONE ENCOUNTER
Reason for call:   [x] Refill   [] Prior Auth  [] Other:     Office:   [] PCP/Provider -   [x] Specialty/Provider -     Medication: Klonopin    Dose/Frequency: 1 mg     Quantity: #90    Pharmacy: [place] [id number] [address]    Does the patient have enough for 3 days?   [x] Yes   [] No - Send as HP to POD

## 2024-11-06 RX ORDER — CLONAZEPAM 1 MG/1
1 TABLET ORAL 3 TIMES DAILY
Qty: 90 TABLET | Refills: 0 | Status: SHIPPED | OUTPATIENT
Start: 2024-11-06 | End: 2024-12-06

## 2024-11-07 ENCOUNTER — HOSPITAL ENCOUNTER (OUTPATIENT)
Dept: MRI IMAGING | Facility: HOSPITAL | Age: 64
End: 2024-11-07
Attending: STUDENT IN AN ORGANIZED HEALTH CARE EDUCATION/TRAINING PROGRAM
Payer: COMMERCIAL

## 2024-11-07 DIAGNOSIS — M54.41 CHRONIC BILATERAL LOW BACK PAIN WITH BILATERAL SCIATICA: ICD-10-CM

## 2024-11-07 DIAGNOSIS — G89.29 CHRONIC BILATERAL LOW BACK PAIN WITH BILATERAL SCIATICA: ICD-10-CM

## 2024-11-07 DIAGNOSIS — M54.42 CHRONIC BILATERAL LOW BACK PAIN WITH BILATERAL SCIATICA: ICD-10-CM

## 2024-11-07 PROCEDURE — 72148 MRI LUMBAR SPINE W/O DYE: CPT

## 2024-11-15 NOTE — H&P (VIEW-ONLY)
Pain Medicine Follow-Up Note    Assessment:  1. Chronic bilateral low back pain with bilateral sciatica    2. Lumbar facet arthropathy    3. Vertebrogenic pain        Plan:  Orders Placed This Encounter   Procedures    [place] spine and pain procedure     Standing Status:   Future     Expected Date:   11/18/2024     Expiration Date:   11/18/2028     Reason for Exam::   L4-5 LESI     Anticoagulant hold needed?:   no       New Medications Ordered This Visit   Medications    celecoxib (CeleBREX) 100 mg capsule     Sig: Take 1 capsule (100 mg total) by mouth 2 (two) times a day as needed for moderate pain     Dispense:  60 capsule     Refill:  0       My impressions and treatment recommendations were discussed in detail with the patient who verbalized understanding and had no further questions.      64-year-old female returns her office to discuss MRI findings and next course of action.  She still continues to have midline lower lumbar pain radiating bilaterally.  Symptoms are worsened with prolonged sitting and lifting and writing on a horse.  She has unfortunately failed bilateral L4-5 and L5-S1 medial branch block.  Differential diagnosis for symptoms includes vertebrogenic pain as there is notable Modic endplate changes at the L4 and L5 levels.  We discussed trial of L4-5 lumbar epidural steroid injection.  If this is not helpful, then she may be a candidate for Intracept, but she does not seem too keen on moving forward to that drastic of the step.    In the interim we will start Celebrex 100 mg twice daily as needed.    [place] Prescription Drug Monitoring Program report was reviewed and was appropriate     Complete risks and benefits including bleeding, infection, tissue reaction, nerve injury and allergic reaction were discussed. The approach was demonstrated using models and literature was provided. Verbal and written consent was obtained.      Discharge instructions were provided. I personally saw and examined  the patient and I agree with the above discussed plan of care.    History of Present Illness:    Linette Davila is a 64 y.o. female who presents to [facility] for interval re-evaluation of the above stated pain complaints. The patient has a past medical and chronic pain history as outlined in the assessment section. She was last seen on 8/27/2024 for bilateral L4-5 and L5-S1 medial branch block with no relief.  Returns today with bilateral back and buttock pain.  8 out of 10.  Worse in the morning, evening, nighttime.  Constant, burning, dull/aching, sharp, throbbing.      She is here to review MRI of the lumbar spine.  PT was unhelfpul.    She reports pain in the lower lumbar region, midline and spanning bilaterally. She reports numbness in the bilateral lower extremities in an unclear pattern. Most painful activities are riding a horse and lifting objects. Prolonged sitting also hurts.     Other than as stated above, the patient denies any interval changes in medications, medical condition, mental condition, symptoms, or allergies since the last office visit.         Review of Systems:    Review of Systems   Musculoskeletal:  Positive for arthralgias and back pain.        DROM         Past Medical History:   Diagnosis Date    Anxiety     Depression 1987    SVT (supraventricular tachycardia) (HCC)     Varicella 1987       Past Surgical History:   Procedure Laterality Date    CARDIAC ELECTROPHYSIOLOGY PROCEDURE N/A 05/30/2023    Procedure: Cardiac eps/svt ablation;  Surgeon: Jigar Roe MD;  Location: [facility];  Service: Cardiology    JOINT REPLACEMENT  2010    right hip       Family History   Problem Relation Age of Onset    Depression Mother     Anxiety disorder Mother     Colon polyps Mother     Hearing loss Mother     Irritable bowel syndrome Mother     Ulcerative colitis Mother     Alcohol abuse Father     Addiction problem Father     Depression Brother     Anxiety  disorder Brother     Colon cancer Maternal Grandmother     Depression Maternal Grandmother     Mental illness Maternal Grandmother     Stomach cancer Maternal Grandmother        Social History     Occupational History    Not on file   Tobacco Use    Smoking status: Every Day     Current packs/day: 1.00     Average packs/day: 1 pack/day for 34.5 years (34.5 ttl pk-yrs)     Types: Cigarettes     Start date: 5/23/1990    Smokeless tobacco: Never   Vaping Use    Vaping status: Never Used   Substance and Sexual Activity    Alcohol use: Not Currently    Drug use: Not Currently     Comment: medical marijuana    Sexual activity: Not Currently     Partners: Female, Male     Birth control/protection: Abstinence, Post-menopausal         Current Outpatient Medications:     celecoxib (CeleBREX) 100 mg capsule, Take 1 capsule (100 mg total) by mouth 2 (two) times a day as needed for moderate pain, Disp: 60 capsule, Rfl: 0    clonazePAM (KlonoPIN) 1 mg tablet, Take 1 tablet (1 mg total) by mouth 3 (three) times a day, Disp: 90 tablet, Rfl: 0    gabapentin (Neurontin) 300 mg capsule, Take 1 capsule (300 mg total) by mouth 2 (two) times a day, Disp: 60 capsule, Rfl: 1    methocarbamol (ROBAXIN) 500 mg tablet, Take 1 tablet (500 mg total) by mouth 2 (two) times a day as needed for muscle spasms, Disp: 30 tablet, Rfl: 0    omeprazole (PriLOSEC) 40 MG capsule, Take 1 capsule (40 mg total) by mouth 2 (two) times a day, Disp: 200 capsule, Rfl: 1    QUEtiapine (SEROquel) 100 mg tablet, Take 1 tablet (100 mg total) by mouth 3 (three) times a day, Disp: 90 tablet, Rfl: 0    sertraline (ZOLOFT) 100 mg tablet, Take 1 tablet (100 mg total) by mouth daily, Disp: 90 tablet, Rfl: 0    dicyclomine (BENTYL) 10 mg capsule, Take 1 capsule (10 mg total) by mouth 4 (four) times a day (before meals and at bedtime) (Patient not taking: Reported on 11/18/2024), Disp: 120 capsule, Rfl: 5    No Known Allergies    Physical Exam:    /73   Pulse 73    "Ht 5' 8\" (1.727 m)   Wt 86.2 kg (190 lb)   BMI 28.89 kg/m²     Constitutional:normal, well developed, well nourished, alert, in no distress and non-toxic and no overt pain behavior.  Eyes:anicteric  HEENT:grossly intact  Neck:supple, symmetric, trachea midline and no masses   Pulmonary:even and unlabored  Cardiovascular:No edema or pitting edema present  Skin:Normal without rashes or lesions and well hydrated  Psychiatric:Mood and affect appropriate  Neurologic:Cranial Nerves II-XII grossly intact  Musculoskeletal:normal      Imaging    MRI LUMBAR SPINE WITHOUT CONTRAST        11/7/24     INDICATION: M54.42: Lumbago with sciatica, left side  M54.41: Lumbago with sciatica, right side  G89.29: Other chronic pain. Bilateral lower back pain. There is considerable pain with lumbar extension and facet loading bilaterally.     COMPARISON: CT chest abdomen pelvis 9/5/2024.     TECHNIQUE:  Multiplanar, multisequence imaging of the lumbar spine was performed. .  Imaging performed on 1.5T MRI     IMAGE QUALITY:  Diagnostic     FINDINGS:     VERTEBRAL BODIES:  There are 5 lumbar type vertebral bodies. No scoliosis. Mild L5 on S1 retrolisthesis.  No compression fracture. Modic type II changes at the inferior L4 and superior L5 endplate.  No discrete marrow lesion.     SACRUM:  Normal signal within the sacrum. No evidence of insufficiency or stress fracture.     DISTAL CORD AND CONUS:  Normal size and signal within the distal cord and conus. Conus terminates at the L2 level.     PARASPINAL SOFT TISSUES:  Paraspinal soft tissues are unremarkable.     LOWER THORACIC DISC SPACES: Mild annular bulging of the T12-L1 disc without canal stenosis or foraminal narrowing.     LUMBAR DISC SPACES:     L1-L2: Bulging annulus with mild canal narrowing. No foraminal stenosis.     L2-L3: Bulging annulus with mild central canal narrowing. No foraminal stenosis.     L3-L4: Right greater than left bulging annulus with mild central canal " narrowing. Mild right foraminal narrowing.     L4-L5: Decreased disc space height. Annular bulge with mild canal stenosis and moderate left lateral recess stenosis potentially impinging the left descending L5 nerve root. Mild facet arthropathy. Mild left foraminal narrowing with marginal osteophyte   contributing to foraminal narrowing.     L5-S1: Decreased disc space height. Annular bulge with uncovering of the posterior disc secondary to retrolisthesis. No canal narrowing. Left greater than right facet hypertrophy with moderate left foraminal stenosis with marginal osteophyte on the left   contributing to foraminal narrowing.     OTHER FINDINGS: Left renal cyst.     IMPRESSION:     Spondylotic degenerative changes result in moderate left lateral recess stenosis at L4-5. If symptomatic, this would result in the left L4 radiculopathy.     No greater than mild central or foraminal narrowing elsewhere.        Resident: DEE MOHAN I, the attending radiologist, have reviewed the images and agree with the final report above.     [place] spine and pain procedure    (Results Pending)         Orders Placed This Encounter   Procedures    [place] spine and pain procedure

## 2024-11-15 NOTE — PROGRESS NOTES
Pain Medicine Follow-Up Note    Assessment:  1. Chronic bilateral low back pain with bilateral sciatica    2. Lumbar facet arthropathy    3. Vertebrogenic pain        Plan:  Orders Placed This Encounter   Procedures    [place] spine and pain procedure     Standing Status:   Future     Expected Date:   11/18/2024     Expiration Date:   11/18/2028     Reason for Exam::   L4-5 LESI     Anticoagulant hold needed?:   no       New Medications Ordered This Visit   Medications    celecoxib (CeleBREX) 100 mg capsule     Sig: Take 1 capsule (100 mg total) by mouth 2 (two) times a day as needed for moderate pain     Dispense:  60 capsule     Refill:  0       My impressions and treatment recommendations were discussed in detail with the patient who verbalized understanding and had no further questions.      64-year-old female returns her office to discuss MRI findings and next course of action.  She still continues to have midline lower lumbar pain radiating bilaterally.  Symptoms are worsened with prolonged sitting and lifting and writing on a horse.  She has unfortunately failed bilateral L4-5 and L5-S1 medial branch block.  Differential diagnosis for symptoms includes vertebrogenic pain as there is notable Modic endplate changes at the L4 and L5 levels.  We discussed trial of L4-5 lumbar epidural steroid injection.  If this is not helpful, then she may be a candidate for Intracept, but she does not seem too keen on moving forward to that drastic of the step.    In the interim we will start Celebrex 100 mg twice daily as needed.    [place] Prescription Drug Monitoring Program report was reviewed and was appropriate     Complete risks and benefits including bleeding, infection, tissue reaction, nerve injury and allergic reaction were discussed. The approach was demonstrated using models and literature was provided. Verbal and written consent was obtained.      Discharge instructions were provided. I personally saw and examined  the patient and I agree with the above discussed plan of care.    History of Present Illness:    Linette Davila is a 64 y.o. female who presents to [facility] for interval re-evaluation of the above stated pain complaints. The patient has a past medical and chronic pain history as outlined in the assessment section. She was last seen on 8/27/2024 for bilateral L4-5 and L5-S1 medial branch block with no relief.  Returns today with bilateral back and buttock pain.  8 out of 10.  Worse in the morning, evening, nighttime.  Constant, burning, dull/aching, sharp, throbbing.      She is here to review MRI of the lumbar spine.  PT was unhelfpul.    She reports pain in the lower lumbar region, midline and spanning bilaterally. She reports numbness in the bilateral lower extremities in an unclear pattern. Most painful activities are riding a horse and lifting objects. Prolonged sitting also hurts.     Other than as stated above, the patient denies any interval changes in medications, medical condition, mental condition, symptoms, or allergies since the last office visit.         Review of Systems:    Review of Systems   Musculoskeletal:  Positive for arthralgias and back pain.        DROM         Past Medical History:   Diagnosis Date    Anxiety     Depression 1987    SVT (supraventricular tachycardia) (HCC)     Varicella 1987       Past Surgical History:   Procedure Laterality Date    CARDIAC ELECTROPHYSIOLOGY PROCEDURE N/A 05/30/2023    Procedure: Cardiac eps/svt ablation;  Surgeon: Jigar Roe MD;  Location: [facility];  Service: Cardiology    JOINT REPLACEMENT  2010    right hip       Family History   Problem Relation Age of Onset    Depression Mother     Anxiety disorder Mother     Colon polyps Mother     Hearing loss Mother     Irritable bowel syndrome Mother     Ulcerative colitis Mother     Alcohol abuse Father     Addiction problem Father     Depression Brother     Anxiety  disorder Brother     Colon cancer Maternal Grandmother     Depression Maternal Grandmother     Mental illness Maternal Grandmother     Stomach cancer Maternal Grandmother        Social History     Occupational History    Not on file   Tobacco Use    Smoking status: Every Day     Current packs/day: 1.00     Average packs/day: 1 pack/day for 34.5 years (34.5 ttl pk-yrs)     Types: Cigarettes     Start date: 5/23/1990    Smokeless tobacco: Never   Vaping Use    Vaping status: Never Used   Substance and Sexual Activity    Alcohol use: Not Currently    Drug use: Not Currently     Comment: medical marijuana    Sexual activity: Not Currently     Partners: Female, Male     Birth control/protection: Abstinence, Post-menopausal         Current Outpatient Medications:     celecoxib (CeleBREX) 100 mg capsule, Take 1 capsule (100 mg total) by mouth 2 (two) times a day as needed for moderate pain, Disp: 60 capsule, Rfl: 0    clonazePAM (KlonoPIN) 1 mg tablet, Take 1 tablet (1 mg total) by mouth 3 (three) times a day, Disp: 90 tablet, Rfl: 0    gabapentin (Neurontin) 300 mg capsule, Take 1 capsule (300 mg total) by mouth 2 (two) times a day, Disp: 60 capsule, Rfl: 1    methocarbamol (ROBAXIN) 500 mg tablet, Take 1 tablet (500 mg total) by mouth 2 (two) times a day as needed for muscle spasms, Disp: 30 tablet, Rfl: 0    omeprazole (PriLOSEC) 40 MG capsule, Take 1 capsule (40 mg total) by mouth 2 (two) times a day, Disp: 200 capsule, Rfl: 1    QUEtiapine (SEROquel) 100 mg tablet, Take 1 tablet (100 mg total) by mouth 3 (three) times a day, Disp: 90 tablet, Rfl: 0    sertraline (ZOLOFT) 100 mg tablet, Take 1 tablet (100 mg total) by mouth daily, Disp: 90 tablet, Rfl: 0    dicyclomine (BENTYL) 10 mg capsule, Take 1 capsule (10 mg total) by mouth 4 (four) times a day (before meals and at bedtime) (Patient not taking: Reported on 11/18/2024), Disp: 120 capsule, Rfl: 5    No Known Allergies    Physical Exam:    /73   Pulse 73    "Ht 5' 8\" (1.727 m)   Wt 86.2 kg (190 lb)   BMI 28.89 kg/m²     Constitutional:normal, well developed, well nourished, alert, in no distress and non-toxic and no overt pain behavior.  Eyes:anicteric  HEENT:grossly intact  Neck:supple, symmetric, trachea midline and no masses   Pulmonary:even and unlabored  Cardiovascular:No edema or pitting edema present  Skin:Normal without rashes or lesions and well hydrated  Psychiatric:Mood and affect appropriate  Neurologic:Cranial Nerves II-XII grossly intact  Musculoskeletal:normal      Imaging    MRI LUMBAR SPINE WITHOUT CONTRAST        11/7/24     INDICATION: M54.42: Lumbago with sciatica, left side  M54.41: Lumbago with sciatica, right side  G89.29: Other chronic pain. Bilateral lower back pain. There is considerable pain with lumbar extension and facet loading bilaterally.     COMPARISON: CT chest abdomen pelvis 9/5/2024.     TECHNIQUE:  Multiplanar, multisequence imaging of the lumbar spine was performed. .  Imaging performed on 1.5T MRI     IMAGE QUALITY:  Diagnostic     FINDINGS:     VERTEBRAL BODIES:  There are 5 lumbar type vertebral bodies. No scoliosis. Mild L5 on S1 retrolisthesis.  No compression fracture. Modic type II changes at the inferior L4 and superior L5 endplate.  No discrete marrow lesion.     SACRUM:  Normal signal within the sacrum. No evidence of insufficiency or stress fracture.     DISTAL CORD AND CONUS:  Normal size and signal within the distal cord and conus. Conus terminates at the L2 level.     PARASPINAL SOFT TISSUES:  Paraspinal soft tissues are unremarkable.     LOWER THORACIC DISC SPACES: Mild annular bulging of the T12-L1 disc without canal stenosis or foraminal narrowing.     LUMBAR DISC SPACES:     L1-L2: Bulging annulus with mild canal narrowing. No foraminal stenosis.     L2-L3: Bulging annulus with mild central canal narrowing. No foraminal stenosis.     L3-L4: Right greater than left bulging annulus with mild central canal " narrowing. Mild right foraminal narrowing.     L4-L5: Decreased disc space height. Annular bulge with mild canal stenosis and moderate left lateral recess stenosis potentially impinging the left descending L5 nerve root. Mild facet arthropathy. Mild left foraminal narrowing with marginal osteophyte   contributing to foraminal narrowing.     L5-S1: Decreased disc space height. Annular bulge with uncovering of the posterior disc secondary to retrolisthesis. No canal narrowing. Left greater than right facet hypertrophy with moderate left foraminal stenosis with marginal osteophyte on the left   contributing to foraminal narrowing.     OTHER FINDINGS: Left renal cyst.     IMPRESSION:     Spondylotic degenerative changes result in moderate left lateral recess stenosis at L4-5. If symptomatic, this would result in the left L4 radiculopathy.     No greater than mild central or foraminal narrowing elsewhere.        Resident: DEE MOHAN I, the attending radiologist, have reviewed the images and agree with the final report above.     [place] spine and pain procedure    (Results Pending)         Orders Placed This Encounter   Procedures    FL spine and pain procedure

## 2024-11-18 ENCOUNTER — OFFICE VISIT (OUTPATIENT)
Dept: PAIN MEDICINE | Facility: CLINIC | Age: 64
End: 2024-11-18
Payer: COMMERCIAL

## 2024-11-18 VITALS
BODY MASS INDEX: 28.79 KG/M2 | DIASTOLIC BLOOD PRESSURE: 73 MMHG | HEIGHT: 68 IN | SYSTOLIC BLOOD PRESSURE: 110 MMHG | HEART RATE: 73 BPM | WEIGHT: 190 LBS

## 2024-11-18 DIAGNOSIS — M54.42 CHRONIC BILATERAL LOW BACK PAIN WITH BILATERAL SCIATICA: Primary | ICD-10-CM

## 2024-11-18 DIAGNOSIS — M54.89 VERTEBROGENIC PAIN: ICD-10-CM

## 2024-11-18 DIAGNOSIS — M54.41 CHRONIC BILATERAL LOW BACK PAIN WITH BILATERAL SCIATICA: Primary | ICD-10-CM

## 2024-11-18 DIAGNOSIS — M47.816 LUMBAR FACET ARTHROPATHY: ICD-10-CM

## 2024-11-18 DIAGNOSIS — G89.29 CHRONIC BILATERAL LOW BACK PAIN WITH BILATERAL SCIATICA: Primary | ICD-10-CM

## 2024-11-18 PROCEDURE — 99214 OFFICE O/P EST MOD 30 MIN: CPT | Performed by: STUDENT IN AN ORGANIZED HEALTH CARE EDUCATION/TRAINING PROGRAM

## 2024-11-18 RX ORDER — COVID-19 ANTIGEN TEST
KIT MISCELLANEOUS
COMMUNITY
End: 2024-11-18

## 2024-11-18 RX ORDER — CELECOXIB 100 MG/1
100 CAPSULE ORAL 2 TIMES DAILY PRN
Qty: 60 CAPSULE | Refills: 0 | Status: SHIPPED | OUTPATIENT
Start: 2024-11-18

## 2024-11-19 ENCOUNTER — PATIENT MESSAGE (OUTPATIENT)
Dept: PAIN MEDICINE | Facility: CLINIC | Age: 64
End: 2024-11-19

## 2024-11-19 NOTE — PATIENT COMMUNICATION
Pt is scheduled for LESI with Dr Vides on 12/10/24    Pt is not diabetic and reports she does not take prescription blood thinners or full dose aspirin    Pt given instructions in office and via myc message    Have you completed PT/HEP/Chiro in the past 6 months for dedicated area? PT with [facility] from 9/26/24 thru 10/15/24 -- reported to Dr Vides on 11/18/24 that PT was unhelpful -- pt has also tried meds and injections of pain relief  If yes, how long did you complete?  What was the frequency?  Did it provide relief?  If no, reason therapy was not completed?

## 2024-11-26 ENCOUNTER — TELEMEDICINE (OUTPATIENT)
Dept: PSYCHIATRY | Facility: CLINIC | Age: 64
End: 2024-11-26
Payer: COMMERCIAL

## 2024-11-26 DIAGNOSIS — F41.1 GAD (GENERALIZED ANXIETY DISORDER): Primary | ICD-10-CM

## 2024-11-26 DIAGNOSIS — F33.1 MODERATE EPISODE OF RECURRENT MAJOR DEPRESSIVE DISORDER (HCC): ICD-10-CM

## 2024-11-26 PROCEDURE — 99214 OFFICE O/P EST MOD 30 MIN: CPT

## 2024-11-26 RX ORDER — QUETIAPINE FUMARATE 100 MG/1
100 TABLET, FILM COATED ORAL 3 TIMES DAILY
Qty: 90 TABLET | Refills: 0 | Status: SHIPPED | OUTPATIENT
Start: 2024-11-26

## 2024-11-26 RX ORDER — GABAPENTIN 300 MG/1
300 CAPSULE ORAL 3 TIMES DAILY
Qty: 270 CAPSULE | Refills: 0 | Status: SHIPPED | OUTPATIENT
Start: 2024-11-26

## 2024-11-26 NOTE — PSYCH
Virtual Regular Visit    Verification of patient location:    Patient is located in the following state in which I hold an active license [place]    Assessment & Plan  JACOBY (generalized anxiety disorder)   -Continue Seroquel 100 mg TID for anxiety, insomnia   -Klonopin 1 mg 3 times daily for severe anxiety and panic- on this medication for over 20 years, using appropriately, provider recommends decreasing usage and if unable to consider entering partial program again to increase coping skills, pt hesistant  Discussed with patient the risks of sedation, respiratory depression, impairment of ability to drive and potential for abuse and addiction related to treatment with benzodiazepine medications. The patient understands risk of treatment with benzodiazepine medications, agrees to not drive if feels impaired and agrees to take medications as prescribed. Patient was also informed of risks of being on or starting opioid medications due to drug interactions and potential for serious respiratory depression and death  Moderate episode of recurrent major depressive disorder (HCC)     -Continue Zoloft 100mg daily   PARQ completed including serotonin syndrome, SIADH, worsening depression, suicidality, induction of flaquito, GI upset, headaches, activation, sexual side effects, sedation, potential drug interactions, and others.      -Optimize Gabapentin 300mg TID for anxiety, pt failed buspar in past   PARQ for neurontin including depression/suicidality, allergic reactions (SJS, angioedema, rhabdomyolysis, eosinophilia, anaphylaxis), dizziness and somnolence, diarrhea, xerostomia, headaches, drug interactions and others.           Encounter provider WILL Oconnell    Provider located at    [facility] [address] [facility] [address] [phone]    Recent Visits  No visits were found meeting these conditions.  Showing recent visits within past 7 days and  meeting all other requirements  Today's Visits  Date Type Provider Dept   11/26/24 Telemedicine WILL Oconnell  [facility]   Showing today's visits and meeting all other requirements  Future Appointments  No visits were found meeting these conditions.  Showing future appointments within next 150 days and meeting all other requirements         The patient was identified by name and date of birth. Linette Davila was informed that this is a telemedicine visit and that the visit is being conducted throughthe Epic Embedded platform. She agrees to proceed..  My office door was closed. No one else was in the room.  She acknowledged consent and understanding of privacy and security of the video platform. The patient has agreed to participate and understands they can discontinue the visit at any time.    Patient is aware this is a billable service.     HPI     Current Outpatient Medications   Medication Sig Dispense Refill    celecoxib (CeleBREX) 100 mg capsule Take 1 capsule (100 mg total) by mouth 2 (two) times a day as needed for moderate pain 60 capsule 0    clonazePAM (KlonoPIN) 1 mg tablet Take 1 tablet (1 mg total) by mouth 3 (three) times a day 90 tablet 0    dicyclomine (BENTYL) 10 mg capsule Take 1 capsule (10 mg total) by mouth 4 (four) times a day (before meals and at bedtime) (Patient not taking: Reported on 11/18/2024) 120 capsule 5    gabapentin (Neurontin) 300 mg capsule Take 1 capsule (300 mg total) by mouth 2 (two) times a day 60 capsule 1    methocarbamol (ROBAXIN) 500 mg tablet Take 1 tablet (500 mg total) by mouth 2 (two) times a day as needed for muscle spasms 30 tablet 0    omeprazole (PriLOSEC) 40 MG capsule Take 1 capsule (40 mg total) by mouth 2 (two) times a day 200 capsule 1    QUEtiapine (SEROquel) 100 mg tablet Take 1 tablet (100 mg total) by mouth 3 (three) times a day 90 tablet 0    sertraline (ZOLOFT) 100 mg tablet Take 1 tablet (100 mg total) by mouth daily  90 tablet 0     No current facility-administered medications for this visit.       Review of Systems  Video Exam    There were no vitals filed for this visit.    Physical Exam   As a result of this visit, I have referred the patient for further respiratory evaluation. No      VIRTUAL VISIT DISCLAIMER    Linette Davila acknowledges that she has consented to an online visit or consultation. She understands that the online visit is based solely on information provided by her, and that, in the absence of a face-to-face physical evaluation by the physician, the diagnosis she receives is both limited and provisional in terms of accuracy and completeness. This is not intended to replace a full medical face-to-face evaluation by the physician. Linette Davila understands and accepts these terms.    MEDICATION MANAGEMENT NOTE        [facility]    Name and Date of Birth:  Linette Davila 64 y.o. 1960 MRN: [id number]    Date of Visit: November 26, 2024    No Known Allergies  SUBJECTIVE:    Linette is seen today for a follow up for Major Depressive Disorder and Generalized Anxiety Disorder. She reports that she continues to experience on and off symptoms since the last visit.  Linette expresses increased optimism compared to past visits in regard to her overall health and specifically chronic pain in lower back where she is soon going to receive pain injections.  Hopeful one of her big stressors may be improved in the near future, her chronic pain also contributes to her severe chronic ongoing anxiety.  Continues to report high levels of anxiety 7/10 with constant worry, racing thoughts, inability to relax, upset stomach, panic.  Continues to state her stomach is often in knots due to her anxiety despite compliance with Seroquel 100 mg tid and Klonopin 1 mg 3 times daily, continues to use medication appropriately and denies adverse effects.  She does report  improvement with the recent initiation of gabapentin 300 mg twice daily for anxiety, states it also has helped with her chronic pain, optimized to 300 mg. 3 times daily during today's office visit as she is reporting no adverse effects.  Continue to recommend decreasing Klonopin usage in the near future once anxiety more managed and hopeful with the addition of gabapentin and potential pain relief from medical team.  Depression remains mild 2/10 mostly related to her chronic pain and medical co morbidities.  She denies any current debilitating symptoms of depression and continues to be more focused on improving anxiety.  She continues psychotherapy for emotional relation, coping skills.  Continue to recommend partial program for chronic anxiety and to help decrease Klonopin usage. Denies SI.      She denies any side effects from medications.    PLAN:     -Continue Zoloft 100mg daily   PARQ completed including serotonin syndrome, SIADH, worsening depression, suicidality, induction of flaquito, GI upset, headaches, activation, sexual side effects, sedation, potential drug interactions, and others.      -Optimize Gabapentin 300mg TID for anxiety, pt failed buspar in past   PARQ for neurontin including depression/suicidality, allergic reactions (SJS, angioedema, rhabdomyolysis, eosinophilia, anaphylaxis), dizziness and somnolence, diarrhea, xerostomia, headaches, drug interactions and others.       -Continue Seroquel 100 mg TID for anxiety, insomnia   -Klonopin 1 mg 3 times daily for severe anxiety and panic- on this medication for over 20 years, using appropriately, provider recommends decreasing usage and if unable to consider entering partial program again to increase coping skills  Discussed with patient the risks of sedation, respiratory depression, impairment of ability to drive and potential for abuse and addiction related to treatment with benzodiazepine medications. The patient understands risk of treatment with  benzodiazepine medications, agrees to not drive if feels impaired and agrees to take medications as prescribed. Patient was also informed of risks of being on or starting opioid medications due to drug interactions and potential for serious respiratory depression and death        Aware of 24 hour and weekend coverage for urgent situations accessed by calling [facility] main practice number  Referral for individual psychotherapy    Diagnoses and all orders for this visit:    JACOBY (generalized anxiety disorder)    Moderate episode of recurrent major depressive disorder (HCC)          Current Outpatient Medications on File Prior to Visit   Medication Sig Dispense Refill    celecoxib (CeleBREX) 100 mg capsule Take 1 capsule (100 mg total) by mouth 2 (two) times a day as needed for moderate pain 60 capsule 0    clonazePAM (KlonoPIN) 1 mg tablet Take 1 tablet (1 mg total) by mouth 3 (three) times a day 90 tablet 0    dicyclomine (BENTYL) 10 mg capsule Take 1 capsule (10 mg total) by mouth 4 (four) times a day (before meals and at bedtime) (Patient not taking: Reported on 11/18/2024) 120 capsule 5    gabapentin (Neurontin) 300 mg capsule Take 1 capsule (300 mg total) by mouth 2 (two) times a day 60 capsule 1    methocarbamol (ROBAXIN) 500 mg tablet Take 1 tablet (500 mg total) by mouth 2 (two) times a day as needed for muscle spasms 30 tablet 0    omeprazole (PriLOSEC) 40 MG capsule Take 1 capsule (40 mg total) by mouth 2 (two) times a day 200 capsule 1    QUEtiapine (SEROquel) 100 mg tablet Take 1 tablet (100 mg total) by mouth 3 (three) times a day 90 tablet 0    sertraline (ZOLOFT) 100 mg tablet Take 1 tablet (100 mg total) by mouth daily 90 tablet 0     No current facility-administered medications on file prior to visit.       Psychotherapy Provided:       Supportive counseling provided.  Medication changes discussed with Linette.  Medication education provided to Linette.     HPI CATHERINE Appetite  Changes and Sleep:     She reports difficulty falling asleep, adequate appetite, low energy. Denies homicidal ideation, denies suicidal ideation    Review Of Systems:      HPI ROS:               Medication Side Effects:  denies    Depression (10 worst): 2/10    Anxiety (10 worst): 7/10    Safety concerns (SI, HI, etc): Denies si and hi    Sleep: 6-7 hrs, better with seroquel    Energy: low    Appetite: 2 meals    Weight Change: denies        Mental Status Evaluation:    Appearance Adequate hygiene and grooming   Behavior calm and cooperative   Mood anxious and depressed  Depression Scale - 2 of 10 (0 = No depression)  Anxiety Scale - 7 of 10 (0 = No anxiety)   Speech Normal rate and volume   Affect constricted   Thought Processes Goal directed and coherent   Thought Content Does not verbalize delusional material   Associations Tightly connected   Perceptual Disturbances Denies hallucinations and does not appear to be responding to internal stimuli   Risk Potential Suicidal/Homicidal Ideation - No evidence of suicidal or homicidal ideation and patient does not verbalize suicidal or homicidal ideation  Risk of Violence - No evidence of risk for violence found on assessment  Risk of Self Mutilation - No evidence of risk for self mutilation found on assessment   Orientation oriented to person, place, time/date and situation   Memory recent and remote memory grossly intact   Consciousness alert and awake   Attention/Concentration attention span and concentration are age appropriate   Insight intact   Judgement intact   Muscle Strength and Gait normal muscle strength and normal muscle tone, normal gait/station and normal balance   Motor Activity no abnormal movements   Language no difficulty naming common objects, no difficulty repeating a phrase, no difficulty writing a sentence   Fund of Knowledge adequate knowledge of current events  adequate fund of knowledge regarding past history  adequate fund of knowledge  regarding vocabulary          Past Medical History:    Past Medical History:   Diagnosis Date    Anxiety     Depression 1987    SVT (supraventricular tachycardia) (HCC)     Varicella 1987        Past Surgical History:   Procedure Laterality Date    CARDIAC ELECTROPHYSIOLOGY PROCEDURE N/A 05/30/2023    Procedure: Cardiac eps/svt ablation;  Surgeon: Jigar Roe MD;  Location: [facility];  Service: Cardiology    JOINT REPLACEMENT  2010    right hip     No Known Allergies  Substance Abuse History:    Social History     Substance and Sexual Activity   Alcohol Use Not Currently     Social History     Substance and Sexual Activity   Drug Use Not Currently    Comment: medical marijuana     Social History:    Social History     Socioeconomic History    Marital status: /Civil Union     Spouse name: Not on file    Number of children: Not on file    Years of education: Not on file    Highest education level: Not on file   Occupational History    Not on file   Tobacco Use    Smoking status: Every Day     Current packs/day: 1.00     Average packs/day: 1 pack/day for 34.5 years (34.5 ttl pk-yrs)     Types: Cigarettes     Start date: 5/23/1990    Smokeless tobacco: Never   Vaping Use    Vaping status: Never Used   Substance and Sexual Activity    Alcohol use: Not Currently    Drug use: Not Currently     Comment: medical marijuana    Sexual activity: Not Currently     Partners: Female, Male     Birth control/protection: Abstinence, Post-menopausal   Other Topics Concern    Not on file   Social History Narrative    Not on file     Social Drivers of Health     Financial Resource Strain: Not on file   Food Insecurity: Not on file   Transportation Needs: Not on file   Physical Activity: Not on file   Stress: Not on file   Social Connections: Not on file   Intimate Partner Violence: Not on file   Housing Stability: Not on file     Family Psychiatric History:     Family History   Problem Relation Age of Onset     Depression Mother     Anxiety disorder Mother     Colon polyps Mother     Hearing loss Mother     Irritable bowel syndrome Mother     Ulcerative colitis Mother     Alcohol abuse Father     Addiction problem Father     Depression Brother     Anxiety disorder Brother     Colon cancer Maternal Grandmother     Depression Maternal Grandmother     Mental illness Maternal Grandmother     Stomach cancer Maternal Grandmother      History Review:The following portions of the patient's history were reviewed and updated as appropriate: allergies, current medications, past family history, past medical history, past social history, past surgical history and problem list     OBJECTIVE:     Vital signs in last 24 hours:    There were no vitals filed for this visit.  Laboratory Results:   Recent Labs (last 2 months):   No visits with results within 2 Month(s) from this visit.   Latest known visit with results is:   Appointment on 08/19/2024   Component Date Value    TSH 3RD GENERATON 08/19/2024 1.437     IgA 08/19/2024 109     Gliadin IgA 08/19/2024 2     Gliadin IgG 08/19/2024 1     Tissue Transglut Ab IGG 08/19/2024 2     TISSUE TRANSGLUTAMINASE * 08/19/2024 <2     Endomysial IgA 08/19/2024 Negative     WBC 08/19/2024 11.49 (H)     RBC 08/19/2024 4.75     Hemoglobin 08/19/2024 13.5     Hematocrit 08/19/2024 42.5     MCV 08/19/2024 90     MCH 08/19/2024 28.4     MCHC 08/19/2024 31.8     RDW 08/19/2024 13.7     Platelets 08/19/2024 249     MPV 08/19/2024 11.5     Sodium 08/19/2024 140     Potassium 08/19/2024 4.2     Chloride 08/19/2024 105     CO2 08/19/2024 27     ANION GAP 08/19/2024 8     BUN 08/19/2024 12     Creatinine 08/19/2024 0.81     Glucose, Fasting 08/19/2024 87     Calcium 08/19/2024 9.2     AST 08/19/2024 15     ALT 08/19/2024 11     Alkaline Phosphatase 08/19/2024 80     Total Protein 08/19/2024 7.0     Albumin 08/19/2024 3.8     Total Bilirubin 08/19/2024 0.35     eGFR 08/19/2024 77      I have personally reviewed  all pertinent laboratory/tests results.    Suicide/Homicide Risk Assessment:    Risk of Harm to Self:  Protective Factors: no current suicidal ideation, access to mental health treatment, being , compliant with medications, compliant with mental health treatment, good self-esteem, having a desire to be alive  Based on today's assessment, Linette presents the following risk of harm to self: minimal    Risk of Harm to Others:  Based on today's assessment, Linette presents the following risk of harm to others: none    The following interventions are recommended: referral for psychotherapy    Medications Risks/Benefits:      Risks, Benefits And Possible Side Effects Of Medications:    Discussed risks and benefits of treatment with patient including risk of suicidality, serotonin syndrome, increased QTc interval and SIADH related to treatment with antidepressants; Risk of induction of manic symptoms in certain patient populations, risk of parkinsonian symptoms, metabolic syndrome, tardive dyskinesia and neuroleptic malignant syndrome related to treatment with antipsychotic medications, risks of misuse, abuse or dependence, sedation and respiratory depression related to treatment with benzodiazepine medications, and Risk of sedation, dizziness and CNS depression during treatment with Gabapentin     Controlled Medication Discussion:     Linette has been filling controlled prescriptions on time as prescribed according to [place] Prescription Drug Monitoring Program  Discussed with Linette the risks of sedation, respiratory depression, impairment of ability to drive and potential for abuse and addiction related to treatment with benzodiazepine medications. She understands risk of treatment with benzodiazepine medications, agrees to not drive if feels impaired and agrees to take medications as prescribed  Discussed with Linette Black Box warning on concurrent use of benzodiazepines and opioid medications including  sedation, respiratory depression, coma and death. She understands the risk of treatment with benzodiazepines in addition to opioids and wants to continue taking those medications  Discussed with Linette increased risk of impairment related to concurrent use of benzodiazepines and hypnotic medications including excessive sedation, psychomotor impairment and respiratory depression. She understands the risk of treatment with benzodiazepines in addition to hypnotic medications and wants to continue taking those medications    Treatment Plan:    Due for update/Updated:   no  Last treatment plan done 9/12, due 3/12/25    WILL Oconnell 11/26/24    This note was shared with patient.      Visit Time    Visit Start Time: 2  Visit Stop Time: 220  Total Visit Duration:  20 minutes

## 2024-12-04 DIAGNOSIS — F41.1 GAD (GENERALIZED ANXIETY DISORDER): ICD-10-CM

## 2024-12-04 RX ORDER — CLONAZEPAM 1 MG/1
1 TABLET ORAL 3 TIMES DAILY
Qty: 45 TABLET | Refills: 0 | Status: SHIPPED | OUTPATIENT
Start: 2024-12-04 | End: 2024-12-19

## 2024-12-04 NOTE — TELEPHONE ENCOUNTER
Reason for call:   [x] Refill   [] Prior Auth  [] Other:     Office:   [] PCP/Provider -   [x] Specialty/Provider - Psychiatry/ WILL Wilson    Medication: clonazePAM (KlonoPIN) 1 mg tablet     Dose/Frequency: Take 1 tablet (1 mg total) by mouth 3 (three) times a day    Quantity: 90    Pharmacy: [id number] [address] [phone]    Does the patient have enough for 3 days?   [] Yes   [x] No - Send as HP to POD

## 2024-12-04 NOTE — TELEPHONE ENCOUNTER
Medication:  PDMP  11/06/2024 11/06/2024 clonazePAM (Tablet) 90.0 30 1 MG NA [facility] Commercial [place] 0 / 0 [place]   1 [id number] 10/07/2024 10/07/2024 clonazePAM (Tablet) 90.0 30 1 MG [facility] [place] / 0 [place]   1 [id number] 09/06/2024 09/06/2024 clonazePAM (Tablet) 90.0 30 1 MG NA [facility] Commercial [facility] 0 / 0  Active agreement on file -

## 2024-12-09 NOTE — TELEPHONE ENCOUNTER
Patient called the RX Refill Line. Message is being forwarded to the office.     Patient is requesting a message be sent to the office regarding her medication. She states that she would like the additional medication sent to [id number] - [place] - [address] [place], [place] [id number]  Phone: [phone]  Fax: [phone]     Please review    Please contact patient at [phone]

## 2024-12-10 ENCOUNTER — HOSPITAL ENCOUNTER (OUTPATIENT)
Dept: RADIOLOGY | Facility: CLINIC | Age: 64
Discharge: HOME | End: 2024-12-10
Payer: COMMERCIAL

## 2024-12-10 VITALS
OXYGEN SATURATION: 95 % | DIASTOLIC BLOOD PRESSURE: 68 MMHG | SYSTOLIC BLOOD PRESSURE: 117 MMHG | RESPIRATION RATE: 20 BRPM | HEART RATE: 73 BPM

## 2024-12-10 DIAGNOSIS — G89.29 CHRONIC BILATERAL LOW BACK PAIN WITH BILATERAL SCIATICA: ICD-10-CM

## 2024-12-10 DIAGNOSIS — M54.42 CHRONIC BILATERAL LOW BACK PAIN WITH BILATERAL SCIATICA: ICD-10-CM

## 2024-12-10 DIAGNOSIS — M54.41 CHRONIC BILATERAL LOW BACK PAIN WITH BILATERAL SCIATICA: ICD-10-CM

## 2024-12-10 PROCEDURE — 62323 NJX INTERLAMINAR LMBR/SAC: CPT | Performed by: STUDENT IN AN ORGANIZED HEALTH CARE EDUCATION/TRAINING PROGRAM

## 2024-12-10 RX ORDER — METHYLPREDNISOLONE ACETATE 80 MG/ML
80 INJECTION, SUSPENSION INTRA-ARTICULAR; INTRALESIONAL; INTRAMUSCULAR; PARENTERAL; SOFT TISSUE ONCE
Status: COMPLETED | OUTPATIENT
Start: 2024-12-10 | End: 2024-12-10

## 2024-12-10 RX ADMIN — IOHEXOL 1 ML: 300 INJECTION, SOLUTION INTRAVENOUS at 11:22

## 2024-12-10 RX ADMIN — METHYLPREDNISOLONE ACETATE 80 MG: 80 INJECTION, SUSPENSION INTRA-ARTICULAR; INTRALESIONAL; INTRAMUSCULAR; SOFT TISSUE at 11:22

## 2024-12-10 NOTE — DISCHARGE INSTR - LAB
Epidural Steroid Injection   WHAT YOU NEED TO KNOW:   An epidural steroid injection (AMIE) is a procedure to inject steroid medicine into the epidural space. The epidural space is between your spinal cord and vertebrae. Steroids reduce inflammation and fluid buildup in your spine that may be causing pain. You may be given pain medicine along with the steroids.          ACTIVITY  Do not drive or operate machinery today.  No strenuous activity today - bending, lifting, etc.  You may resume normal activites starting tomorrow - start slowly and as tolerated.  You may shower today, but no tub baths or hot tubs.  You may have numbness for several hours from the local anesthetic. Please use caution and common sense, especially with weight-bearing activities.    CARE OF THE INJECTION SITE  If you have soreness or pain, apply ice to the area today (20 minutes on/20 minutes off).  Starting tomorrow, you may use warm, moist heat or ice if needed.  You may have an increase or change in your discomfort for 36-48 hours after your treatment.  Apply ice and continue with any pain medication you have been prescribed.  Notify the [facility] if you have any of the following: redness, drainage, swelling, headache, stiff neck or fever above 100°F.    SPECIAL INSTRUCTIONS  Our office will contact you in approximately 14 days for a progress report.    MEDICATIONS  Continue to take all routine medications.  Our office may have instructed you to hold some medications.    As no general anesthesia was used in today's procedure, you should not experience any side effects related to anesthesia.     If you are diabetic, the steroids used in today's injection may temporarily increase your blood sugar levels after the first few days after your injection. Please keep a close eye on your sugars and alert the doctor who manages your diabetes if your sugars are significantly high from your baseline or you are symptomatic.     If you have a  problem specifically related to your procedure, please call our office at [phone].  Problems not related to your procedure should be directed to your primary care physician.

## 2024-12-10 NOTE — INTERVAL H&P NOTE
Update: (This section must be completed if the H&P was completed greater than 24 hrs to procedure or admission)    H&P reviewed. After examining the patient, I find no changed to the H&P since it had been written.    Patient re-evaluated. Accept as history and physical.    Dima Vides MD/December 10, 2024/11:09 AM

## 2024-12-17 DIAGNOSIS — F41.1 GAD (GENERALIZED ANXIETY DISORDER): ICD-10-CM

## 2024-12-17 DIAGNOSIS — F33.2 MDD (MAJOR DEPRESSIVE DISORDER), RECURRENT SEVERE, WITHOUT PSYCHOSIS (HCC): ICD-10-CM

## 2024-12-17 RX ORDER — CLONAZEPAM 1 MG/1
1 TABLET ORAL 3 TIMES DAILY
Qty: 45 TABLET | Refills: 0 | Status: SHIPPED | OUTPATIENT
Start: 2024-12-17 | End: 2025-01-01

## 2024-12-17 RX ORDER — SERTRALINE HYDROCHLORIDE 100 MG/1
100 TABLET, FILM COATED ORAL DAILY
Qty: 90 TABLET | Refills: 0 | Status: SHIPPED | OUTPATIENT
Start: 2024-12-17

## 2024-12-17 RX ORDER — SERTRALINE HYDROCHLORIDE 100 MG/1
100 TABLET, FILM COATED ORAL DAILY
Qty: 90 TABLET | Refills: 0 | Status: CANCELLED | OUTPATIENT
Start: 2024-12-17

## 2024-12-17 NOTE — TELEPHONE ENCOUNTER
Reason for call:   [x] Refill   [] Prior Auth  [] Other:     Office:   [] PCP/Provider -   [x] Specialty/Provider - WILL Oconnell / [facility] [place]     Medication: sertraline (ZOLOFT) 100 mg tablet / Take 1 tablet (100 mg total) by mouth daily     [id number] [place] [place] [facility] [employer] [facility]     Does the patient have enough for 3 days?   [x] Yes   [] No - Send as HP to POD

## 2024-12-17 NOTE — TELEPHONE ENCOUNTER
Patient called the RX Refill Line. Message is being forwarded to the office.     Patient said she called earlier to request the sertraline but got a "[employer]t message saying that it was denied, pt is wondering why, said it was last filled on 11/20 so she's almost out    I told her that I didn't see the message about it being denied but that I could see that clonazepam was refilled and not the sertraline     Pt would like a call back if the sertraline is going to be denied again so that she knows why and if not, the refill can be sent to the [facility] on [address]    Please contact patient at

## 2024-12-20 ENCOUNTER — OFFICE VISIT (OUTPATIENT)
Dept: PAIN MEDICINE | Facility: CLINIC | Age: 64
End: 2024-12-20
Payer: COMMERCIAL

## 2024-12-20 VITALS
WEIGHT: 190 LBS | SYSTOLIC BLOOD PRESSURE: 121 MMHG | DIASTOLIC BLOOD PRESSURE: 76 MMHG | BODY MASS INDEX: 28.79 KG/M2 | HEIGHT: 68 IN | HEART RATE: 91 BPM

## 2024-12-20 DIAGNOSIS — M51.360 DEGENERATION OF INTERVERTEBRAL DISC OF LUMBAR REGION WITH DISCOGENIC BACK PAIN: ICD-10-CM

## 2024-12-20 DIAGNOSIS — M54.50 CHRONIC BILATERAL LOW BACK PAIN, UNSPECIFIED WHETHER SCIATICA PRESENT: Primary | ICD-10-CM

## 2024-12-20 DIAGNOSIS — M43.10 RETROLISTHESIS: ICD-10-CM

## 2024-12-20 DIAGNOSIS — G89.29 CHRONIC BILATERAL LOW BACK PAIN, UNSPECIFIED WHETHER SCIATICA PRESENT: Primary | ICD-10-CM

## 2024-12-20 PROCEDURE — 99214 OFFICE O/P EST MOD 30 MIN: CPT | Performed by: STUDENT IN AN ORGANIZED HEALTH CARE EDUCATION/TRAINING PROGRAM

## 2024-12-20 RX ORDER — TRAMADOL HYDROCHLORIDE 50 MG/1
50 TABLET ORAL 2 TIMES DAILY PRN
Qty: 20 TABLET | Refills: 0 | Status: SHIPPED | OUTPATIENT
Start: 2024-12-20

## 2024-12-20 NOTE — PROGRESS NOTES
Pain Medicine Follow-Up Note    Assessment:  1. Chronic bilateral low back pain, unspecified whether sciatica present    2. Degeneration of intervertebral disc of lumbar region with discogenic back pain    3. Retrolisthesis        Plan:  Orders Placed This Encounter   Procedures    Ambulatory referral to Neurosurgery     Standing Status:   Future     Expiration Date:   12/20/2025     Referral Priority:   Routine     Referral Type:   Consult - AMB     Referral Reason:   Specialty Services Required     Referred to Provider:   Alexandre Holland MD     Requested Specialty:   Neurosurgery     Number of Visits Requested:   1     Expiration Date:   12/20/2025       New Medications Ordered This Visit   Medications    traMADol (Ultram) 50 mg tablet     Sig: Take 1 tablet (50 mg total) by mouth 2 (two) times a day as needed for severe pain Do not take with klonopin     Dispense:  20 tablet     Refill:  0       My impressions and treatment recommendations were discussed in detail with the patient who verbalized understanding and had no further questions.      64F returns following failed L4-5 LESI. Still has notable midline lower lumbar pain worsened with sitting and prolonged standing. Failed MBB as well. Pain likely vertebrogenic. Has notable multilevel DDD and Modic changes at L4 and L5 endplates. May be intracept candidate and information was provided. Will first have her see Dr. Holland for surgical evaluation. If issue is non operative then can consider intracept procedure.     Short course tramadol sent for immediate pain. Patient is taking klonopin TID. Instructed not to take with this medication.     [place] Prescription Drug Monitoring Program report was reviewed and was appropriate     Complete risks and benefits including bleeding, infection, tissue reaction, nerve injury and allergic reaction were discussed. The approach was demonstrated using models and literature was provided. Verbal and written  consent was obtained.     Discharge instructions were provided. I personally saw and examined the patient and I agree with the above discussed plan of care.    History of Present Illness:    Lintete Davila is a 64 y.o. female who presents to [facility] for interval re-evaluation of the above stated pain complaints. The patient has a past medical and chronic pain history as outlined in the assessment section. She was last seen on 12/10/24 for LESI L4-5 with no relief. 8/10 pain today. Back into both legs. All day. Constant, burning, dull/aching, throbbing, numbness    Other than as stated above, the patient denies any interval changes in medications, medical condition, mental condition, symptoms, or allergies since the last office visit.         Review of Systems:    Review of Systems   Musculoskeletal:  Positive for arthralgias and back pain.         Past Medical History:   Diagnosis Date    Anxiety     Depression 1987    SVT (supraventricular tachycardia) (HCC)     Varicella 1987       Past Surgical History:   Procedure Laterality Date    CARDIAC ELECTROPHYSIOLOGY PROCEDURE N/A 05/30/2023    Procedure: Cardiac eps/svt ablation;  Surgeon: Jigar Roe MD;  Location: [facility];  Service: Cardiology    JOINT REPLACEMENT  2010    right hip       Family History   Problem Relation Age of Onset    Depression Mother     Anxiety disorder Mother     Colon polyps Mother     Hearing loss Mother     Irritable bowel syndrome Mother     Ulcerative colitis Mother     Alcohol abuse Father     Addiction problem Father     Depression Brother     Anxiety disorder Brother     Colon cancer Maternal Grandmother     Depression Maternal Grandmother     Mental illness Maternal Grandmother     Stomach cancer Maternal Grandmother        Social History     Occupational History    Not on file   Tobacco Use    Smoking status: Every Day     Current packs/day: 1.00     Average packs/day: 1 pack/day for  "34.6 years (34.6 ttl pk-yrs)     Types: Cigarettes     Start date: 5/23/1990    Smokeless tobacco: Never   Vaping Use    Vaping status: Never Used   Substance and Sexual Activity    Alcohol use: Not Currently    Drug use: Not Currently     Comment: medical marijuana    Sexual activity: Not Currently     Partners: Female, Male     Birth control/protection: Abstinence, Post-menopausal         Current Outpatient Medications:     celecoxib (CeleBREX) 100 mg capsule, Take 1 capsule (100 mg total) by mouth 2 (two) times a day as needed for moderate pain, Disp: 60 capsule, Rfl: 0    clonazePAM (KlonoPIN) 1 mg tablet, Take 1 tablet (1 mg total) by mouth 3 (three) times a day for 15 days, Disp: 45 tablet, Rfl: 0    gabapentin (Neurontin) 300 mg capsule, Take 1 capsule (300 mg total) by mouth 3 (three) times a day, Disp: 270 capsule, Rfl: 0    methocarbamol (ROBAXIN) 500 mg tablet, Take 1 tablet (500 mg total) by mouth 2 (two) times a day as needed for muscle spasms, Disp: 30 tablet, Rfl: 0    omeprazole (PriLOSEC) 40 MG capsule, Take 1 capsule (40 mg total) by mouth 2 (two) times a day, Disp: 200 capsule, Rfl: 1    QUEtiapine (SEROquel) 100 mg tablet, Take 1 tablet (100 mg total) by mouth 3 (three) times a day, Disp: 90 tablet, Rfl: 0    sertraline (ZOLOFT) 100 mg tablet, Take 1 tablet (100 mg total) by mouth daily, Disp: 90 tablet, Rfl: 0    traMADol (Ultram) 50 mg tablet, Take 1 tablet (50 mg total) by mouth 2 (two) times a day as needed for severe pain Do not take with klonopin, Disp: 20 tablet, Rfl: 0    dicyclomine (BENTYL) 10 mg capsule, Take 1 capsule (10 mg total) by mouth 4 (four) times a day (before meals and at bedtime) (Patient not taking: Reported on 11/18/2024), Disp: 120 capsule, Rfl: 5    No Known Allergies    Physical Exam:    /76   Pulse 91   Ht 5' 8\" (1.727 m)   Wt 86.2 kg (190 lb)   BMI 28.89 kg/m²     Constitutional:normal, well developed, well nourished, alert, in no distress and non-toxic and " no overt pain behavior.  Eyes:anicteric  HEENT:grossly intact  Neck:supple, symmetric, trachea midline and no masses   Pulmonary:even and unlabored  Cardiovascular:No edema or pitting edema present  Skin:Normal without rashes or lesions and well hydrated  Psychiatric:Mood and affect appropriate  Neurologic:Cranial Nerves II-XII grossly intact  Musculoskeletal:normal      Imaging  No orders to display         Orders Placed This Encounter   Procedures    Ambulatory referral to Neurosurgery

## 2025-01-07 DIAGNOSIS — F41.1 GAD (GENERALIZED ANXIETY DISORDER): ICD-10-CM

## 2025-01-07 NOTE — TELEPHONE ENCOUNTER
Reason for call:   [x] Refill   [] Prior Auth  [] Other:     Office:   [] PCP/Provider -   [x] Specialty/Provider - Jabier Wilson     Medication: clonazePAM (KlonoPIN) 1 mg tablet      Dose/Frequency: Take 1 tablet (1 mg total) by mouth 3 (three) times a day     Quantity: 90    [facility]    Does the patient have enough for 3 days?   [x] Yes   [] No - Send as HP to POD

## 2025-01-08 RX ORDER — CLONAZEPAM 1 MG/1
1 TABLET ORAL 3 TIMES DAILY
Qty: 90 TABLET | Refills: 0 | Status: SHIPPED | OUTPATIENT
Start: 2025-01-08 | End: 2025-02-07

## 2025-01-08 NOTE — TELEPHONE ENCOUNTER
Medication:  PDMP 1 [id number] 12/17/2024 12/17/2024 clonazePAM (Tablet) 45.0 15 1 MG NA [facility] Commercial Insurance 0 / 0 [place]   1 [id number] 12/04/2024 12/04/2024 clonazePAM (Tablet) 45.0 15 1 MG NA [facility] Commercial Insurance 0 / 0 [place]   1 [id number] 11/06/2024 11/06/2024 clonazePAM (Tablet) 90.0 30 1 MG NA [facility] Commercial Insurance 0 / 0 [place]

## 2025-01-14 ENCOUNTER — CONSULT (OUTPATIENT)
Dept: NEUROSURGERY | Facility: CLINIC | Age: 65
End: 2025-01-14
Payer: COMMERCIAL

## 2025-01-14 VITALS
HEIGHT: 68 IN | OXYGEN SATURATION: 94 % | HEART RATE: 79 BPM | WEIGHT: 190 LBS | SYSTOLIC BLOOD PRESSURE: 140 MMHG | DIASTOLIC BLOOD PRESSURE: 84 MMHG | TEMPERATURE: 98.3 F | RESPIRATION RATE: 18 BRPM | BODY MASS INDEX: 28.79 KG/M2

## 2025-01-14 DIAGNOSIS — M51.360 DEGENERATION OF INTERVERTEBRAL DISC OF LUMBAR REGION WITH DISCOGENIC BACK PAIN: ICD-10-CM

## 2025-01-14 DIAGNOSIS — M43.10 RETROLISTHESIS: ICD-10-CM

## 2025-01-14 DIAGNOSIS — M47.816 SPONDYLOARTHROPATHY OF LUMBAR SPINE: Primary | ICD-10-CM

## 2025-01-14 PROCEDURE — 99204 OFFICE O/P NEW MOD 45 MIN: CPT | Performed by: NEUROLOGICAL SURGERY

## 2025-01-14 RX ORDER — ACETAMINOPHEN 500 MG
500 TABLET ORAL EVERY 6 HOURS PRN
COMMUNITY
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
## 2025-01-15 ENCOUNTER — APPOINTMENT (OUTPATIENT)
Dept: RADIOLOGY | Facility: CLINIC | Age: 65
End: 2025-01-15
Payer: COMMERCIAL

## 2025-01-15 ENCOUNTER — APPOINTMENT (OUTPATIENT)
Dept: LAB | Facility: CLINIC | Age: 65
End: 2025-01-15
Payer: COMMERCIAL

## 2025-01-15 DIAGNOSIS — M43.10 RETROLISTHESIS: ICD-10-CM

## 2025-01-15 DIAGNOSIS — M51.360 DEGENERATION OF INTERVERTEBRAL DISC OF LUMBAR REGION WITH DISCOGENIC BACK PAIN: ICD-10-CM

## 2025-01-15 LAB
ALBUMIN SERPL BCG-MCNC: 4 G/DL (ref 3.5–5)
ALP SERPL-CCNC: 63 U/L (ref 34–104)
ALT SERPL W P-5'-P-CCNC: 12 U/L (ref 7–52)
ANION GAP SERPL CALCULATED.3IONS-SCNC: 7 MMOL/L (ref 4–13)
AST SERPL W P-5'-P-CCNC: 18 U/L (ref 13–39)
BILIRUB SERPL-MCNC: 0.36 MG/DL (ref 0.2–1)
BUN SERPL-MCNC: 13 MG/DL (ref 5–25)
CALCIUM SERPL-MCNC: 9.3 MG/DL (ref 8.4–10.2)
CHLORIDE SERPL-SCNC: 103 MMOL/L (ref 96–108)
CO2 SERPL-SCNC: 28 MMOL/L (ref 21–32)
CREAT SERPL-MCNC: 0.78 MG/DL (ref 0.6–1.3)
CRP SERPL QL: 8.8 MG/L
ERYTHROCYTE [DISTWIDTH] IN BLOOD BY AUTOMATED COUNT: 13.2 % (ref 11.6–15.1)
ERYTHROCYTE [SEDIMENTATION RATE] IN BLOOD: 52 MM/HOUR (ref 0–29)
GFR SERPL CREATININE-BSD FRML MDRD: 81 ML/MIN/1.73SQ M
GLUCOSE P FAST SERPL-MCNC: 93 MG/DL (ref 65–99)
HCT VFR BLD AUTO: 42.8 % (ref 34.8–46.1)
HGB BLD-MCNC: 13.9 G/DL (ref 11.5–15.4)
MCH RBC QN AUTO: 29.1 PG (ref 26.8–34.3)
MCHC RBC AUTO-ENTMCNC: 32.5 G/DL (ref 31.4–37.4)
MCV RBC AUTO: 90 FL (ref 82–98)
PLATELET # BLD AUTO: 308 THOUSANDS/UL (ref 149–390)
PMV BLD AUTO: 11.2 FL (ref 8.9–12.7)
POTASSIUM SERPL-SCNC: 4.4 MMOL/L (ref 3.5–5.3)
PROT SERPL-MCNC: 7.2 G/DL (ref 6.4–8.4)
RBC # BLD AUTO: 4.78 MILLION/UL (ref 3.81–5.12)
SODIUM SERPL-SCNC: 138 MMOL/L (ref 135–147)
URATE SERPL-MCNC: 4.7 MG/DL (ref 2–7.5)
WBC # BLD AUTO: 10.56 THOUSAND/UL (ref 4.31–10.16)

## 2025-01-15 PROCEDURE — 84550 ASSAY OF BLOOD/URIC ACID: CPT

## 2025-01-15 PROCEDURE — 85652 RBC SED RATE AUTOMATED: CPT

## 2025-01-15 PROCEDURE — 86200 CCP ANTIBODY: CPT

## 2025-01-15 PROCEDURE — 86140 C-REACTIVE PROTEIN: CPT

## 2025-01-15 PROCEDURE — 86430 RHEUMATOID FACTOR TEST QUAL: CPT

## 2025-01-15 PROCEDURE — 80053 COMPREHEN METABOLIC PANEL: CPT

## 2025-01-15 PROCEDURE — 86618 LYME DISEASE ANTIBODY: CPT

## 2025-01-15 PROCEDURE — 36415 COLL VENOUS BLD VENIPUNCTURE: CPT

## 2025-01-15 PROCEDURE — 86235 NUCLEAR ANTIGEN ANTIBODY: CPT

## 2025-01-15 PROCEDURE — 85027 COMPLETE CBC AUTOMATED: CPT

## 2025-01-15 PROCEDURE — 72114 X-RAY EXAM L-S SPINE BENDING: CPT

## 2025-01-16 LAB
B BURGDOR IGG+IGM SER QL IA: NEGATIVE
CCP AB SER IA-ACNC: 1.1 (ref ?–10)
ENA SS-A AB SER IA-ACNC: <0.5 U/ML (ref ?–10)
ENA SS-B IGG SER IA-ACNC: <0.6 U/ML (ref ?–10)
RHEUMATOID FACT SER QL LA: NEGATIVE

## 2025-01-21 ENCOUNTER — HOSPITAL ENCOUNTER (OUTPATIENT)
Dept: CT IMAGING | Facility: HOSPITAL | Age: 65
Discharge: HOME | End: 2025-01-21
Attending: NEUROLOGICAL SURGERY
Payer: COMMERCIAL

## 2025-01-21 DIAGNOSIS — M43.10 RETROLISTHESIS: ICD-10-CM

## 2025-01-21 DIAGNOSIS — M51.360 DEGENERATION OF INTERVERTEBRAL DISC OF LUMBAR REGION WITH DISCOGENIC BACK PAIN: ICD-10-CM

## 2025-01-21 PROCEDURE — 72131 CT LUMBAR SPINE W/O DYE: CPT

## 2025-01-27 ENCOUNTER — TELEPHONE (OUTPATIENT)
Age: 65
End: 2025-01-27

## 2025-01-27 NOTE — TELEPHONE ENCOUNTER
Pt called to request Duclofenac 75 mg for her pain, she is having increased pain and has been using her brothers prescription and it has  aleved her pain.  Pt pharmacy is [facility] in [place].  Can we give her a call and let her know if we will prescribe.  Thank You

## 2025-01-27 NOTE — TELEPHONE ENCOUNTER
S/w pt. Last ov 12/20  Pt requesting refill of diclofenac prescribed by neurosurgery in the past.   Pt denies taking celebrex at this time.  Pt was taking 2 75mg tabs bid of diclofenac. Advised safe dose is 1 tab bid    Please advise if refill of diclofenac can be sent to [facility] on file.

## 2025-01-27 NOTE — TELEPHONE ENCOUNTER
Caller: Patient     Doctor:      Reason for call: In increased pain and looking to see if the diclofenac can be prescribed again.    Spoke to  office but they will not prescribe.    She takes 75mg 2 in the morning and 2 at night.    Pharmacy: [id number] - [place] - [facility] [employer]      Call back#: [phone]

## 2025-01-28 DIAGNOSIS — M54.50 CHRONIC BILATERAL LOW BACK PAIN, UNSPECIFIED WHETHER SCIATICA PRESENT: Primary | ICD-10-CM

## 2025-01-28 DIAGNOSIS — G89.29 CHRONIC BILATERAL LOW BACK PAIN, UNSPECIFIED WHETHER SCIATICA PRESENT: Primary | ICD-10-CM

## 2025-01-28 RX ORDER — DICLOFENAC SODIUM 75 MG/1
75 TABLET, DELAYED RELEASE ORAL 2 TIMES DAILY PRN
Qty: 60 TABLET | Refills: 0 | Status: SHIPPED | OUTPATIENT
Start: 2025-01-28 | End: 2025-03-04 | Stop reason: SDUPTHER

## 2025-01-28 NOTE — TELEPHONE ENCOUNTER
Caller: Linette    Doctor: Peewee    Reason for call: patient calling for update can refill of Diclofenac be sent to her pharmacy?    Call back#: [phone]

## 2025-02-05 DIAGNOSIS — F41.1 GAD (GENERALIZED ANXIETY DISORDER): ICD-10-CM

## 2025-02-05 RX ORDER — CLONAZEPAM 1 MG/1
1 TABLET ORAL 3 TIMES DAILY
Qty: 90 TABLET | Refills: 0 | Status: SHIPPED | OUTPATIENT
Start: 2025-02-05 | End: 2025-03-07

## 2025-02-13 ENCOUNTER — OFFICE VISIT (OUTPATIENT)
Dept: NEUROSURGERY | Facility: CLINIC | Age: 65
End: 2025-02-13
Payer: COMMERCIAL

## 2025-02-13 VITALS
RESPIRATION RATE: 18 BRPM | TEMPERATURE: 98.1 F | HEART RATE: 78 BPM | SYSTOLIC BLOOD PRESSURE: 122 MMHG | HEIGHT: 68 IN | BODY MASS INDEX: 28.79 KG/M2 | OXYGEN SATURATION: 97 % | WEIGHT: 190 LBS | DIASTOLIC BLOOD PRESSURE: 78 MMHG

## 2025-02-13 DIAGNOSIS — M47.816 SPONDYLOARTHROPATHY OF LUMBAR SPINE: Primary | ICD-10-CM

## 2025-02-13 PROCEDURE — 99213 OFFICE O/P EST LOW 20 MIN: CPT | Performed by: NEUROLOGICAL SURGERY

## 2025-02-13 RX ORDER — METHYLPREDNISOLONE 4 MG/1
TABLET ORAL
Qty: 1 EACH | Refills: 1 | Status: SHIPPED | OUTPATIENT
Start: 2025-02-13

## 2025-02-13 NOTE — PROGRESS NOTES
"Name: Linette Davila      : 1960      MRN: [id number]  Encounter Provider: Alexandre Holland MD  Encounter Date: 2025   Encounter department: [facility] [place]  :  Assessment & Plan  Spondyloarthropathy of lumbar spine  Non specific inflammatory spondyloarthropathy  Will try a medrol dose pack given increased sed rate and CRP  This appears, at least in part to be an inflammatory spondyloarthropathy  The single surgical solution for this would be a multilevel fusion.  I have recommended this to be done only if all else fails.  Orders:    methylPREDNISolone 4 MG tablet therapy pack; Use as directed on package    Ambulatory Referral to Rheumatology; Future        History of Present Illness   Continued pain  Here to review her labs and study results            Review of Systems   Constitutional: Negative.    HENT: Negative.     Eyes: Negative.    Respiratory: Negative.     Cardiovascular: Negative.    Gastrointestinal: Negative.    Endocrine: Negative.    Genitourinary: Negative.    Musculoskeletal:  Positive for back pain (lbp across the back, intermittenly goes into front of thighs) and myalgias (bilateral legs, have improved). Negative for gait problem.        Ongoing for 1yr   Skin: Negative.    Allergic/Immunologic: Negative.    Neurological:  Positive for numbness (bilateral legs). Negative for weakness.   Hematological: Negative.    Psychiatric/Behavioral: Negative.     All other systems reviewed and are negative.   I have personally reviewed the MA's review of systems and made changes as necessary.         Objective   /78 (BP Location: Left arm, Patient Position: Sitting, Cuff Size: Standard)   Pulse 78   Temp 98.1 °F (36.7 °C) (Temporal)   Resp 18   Ht 5' 8\" (1.727 m)   Wt 86.2 kg (190 lb)   SpO2 97%   BMI 28.89 kg/m²     Physical Exam  Constitutional:       Appearance: Normal appearance.   Neurological:      Mental Status: She is alert. Mental " status is at baseline.       Neurological Exam  Mental Status  Alert.      Radiology Results Review: I personally reviewed the following image studies in PACS and associated radiology reports: CT lumbar. My interpretation of the radiology images/reports is: CT scan of the LS spine demonstrates degenerative disc disease with vacuum disc phenomenon is most severe at the L4-5 and L5-S1 level.  This is also present to a lesser extent at the L3-4 level.  This produces neuroforaminal stenosis as well as facet arthropathy bilaterally.  This is likely partly traumatic and partly inflammatory in nature.  Plain x-rays are also observed which demonstrate no occult fractures..    Various labs were performed which did demonstrate the sedimentation rate and CRP to be increased but all other markers are within normal limits

## 2025-02-18 ENCOUNTER — TELEMEDICINE (OUTPATIENT)
Dept: PSYCHIATRY | Facility: CLINIC | Age: 65
End: 2025-02-18
Payer: COMMERCIAL

## 2025-02-18 DIAGNOSIS — F33.1 MODERATE EPISODE OF RECURRENT MAJOR DEPRESSIVE DISORDER (HCC): ICD-10-CM

## 2025-02-18 DIAGNOSIS — F41.1 GAD (GENERALIZED ANXIETY DISORDER): Primary | ICD-10-CM

## 2025-02-18 PROCEDURE — 99214 OFFICE O/P EST MOD 30 MIN: CPT

## 2025-02-18 RX ORDER — QUETIAPINE FUMARATE 100 MG/1
100 TABLET, FILM COATED ORAL 3 TIMES DAILY
Qty: 90 TABLET | Refills: 0 | Status: SHIPPED | OUTPATIENT
Start: 2025-02-18

## 2025-02-18 RX ORDER — SERTRALINE HYDROCHLORIDE 100 MG/1
100 TABLET, FILM COATED ORAL DAILY
Qty: 90 TABLET | Refills: 0 | Status: SHIPPED | OUTPATIENT
Start: 2025-02-18

## 2025-02-18 NOTE — PSYCH
Virtual Regular Visit    Verification of patient location:home    Patient is located in the following state in which I hold an active license [place]  Administrative Statements   Encounter provider WILL Oconnell    The Patient is located at Home and in the following state in which I hold an active license [place].    The patient was identified by name and date of birth. Linette Davila was informed that this is a telemedicine visit and that the visit is being conducted through the Epic Embedded platform. She agrees to proceed..  My office door was closed. No one else was in the room.  She acknowledged consent and understanding of privacy and security of the video platform. The patient has agreed to participate and understands they can discontinue the visit at any time.          Assessment & Plan  JACOBY (generalized anxiety disorder)      -Continue Gabapentin 300mg TID for anxiety, pt failed buspar in past   PARQ for neurontin including depression/suicidality, allergic reactions (SJS, angioedema, rhabdomyolysis, eosinophilia, anaphylaxis), dizziness and somnolence, diarrhea, xerostomia, headaches, drug interactions and others.       -Continue Seroquel 100 mg TID for anxiety, insomnia   -Klonopin 1 mg 3 times daily for severe anxiety and panic- on this medication for over 20 years, using appropriately, provider recommends decreasing usage and if unable to consider entering partial program again to increase coping skills  Discussed with patient the risks of sedation, respiratory depression, impairment of ability to drive and potential for abuse and addiction related to treatment with benzodiazepine medications. The patient understands risk of treatment with benzodiazepine medications, agrees to not drive if feels impaired and agrees to take medications as prescribed. Patient was also informed of risks of being on or starting opioid medications due to drug interactions and potential for serious respiratory depression  and death  Moderate episode of recurrent major depressive disorder (HCC)     -Continue Zoloft 100mg daily   PARQ completed including serotonin syndrome, SIADH, worsening depression, suicidality, induction of flaquito, GI upset, headaches, activation, sexual side effects, sedation, potential drug interactions, and others.      -Continue Gabapentin 300mg TID for anxiety, pt failed buspar in past   PARQ for neurontin including depression/suicidality, allergic reactions (SJS, angioedema, rhabdomyolysis, eosinophilia, anaphylaxis), dizziness and somnolence, diarrhea, xerostomia, headaches, drug interactions and others.           Encounter provider WILL Oconnell    Provider located at    [facility]  [address] [facility] [place] [address] [phone]    Recent Visits  Date Type Provider Dept   02/18/25 Telemedicine WILL Oconnell [facility]   Showing recent visits within past 7 days and meeting all other requirements  Future Appointments  No visits were found meeting these conditions.  Showing future appointments within next 150 days and meeting all other requirements         The patient was identified by name and date of birth. Linette Davila was informed that this is a telemedicine visit and that the visit is being conducted throughthe Epic Embedded platform. She agrees to proceed..  My office door was closed. No one else was in the room.  She acknowledged consent and understanding of privacy and security of the video platform. The patient has agreed to participate and understands they can discontinue the visit at any time.    Patient is aware this is a billable service.     HPI     Current Outpatient Medications   Medication Sig Dispense Refill   • acetaminophen (TYLENOL) 500 mg tablet Take 500 mg by mouth every 6 (six) hours as needed for mild pain     • clonazePAM (KlonoPIN) 1 mg tablet Take 1 tablet (1 mg total) by  mouth 3 (three) times a day 90 tablet 0   • diclofenac (VOLTAREN) 75 mg EC tablet Take 1 tablet (75 mg total) by mouth 2 (two) times a day as needed (moderate pain) 60 tablet 0   • dicyclomine (BENTYL) 10 mg capsule Take 1 capsule (10 mg total) by mouth 4 (four) times a day (before meals and at bedtime) 120 capsule 5   • gabapentin (Neurontin) 300 mg capsule Take 1 capsule (300 mg total) by mouth 3 (three) times a day 270 capsule 0   • methylPREDNISolone 4 MG tablet therapy pack Use as directed on package 1 each 1   • omeprazole (PriLOSEC) 40 MG capsule Take 1 capsule (40 mg total) by mouth 2 (two) times a day 200 capsule 1   • QUEtiapine (SEROquel) 100 mg tablet Take 1 tablet (100 mg total) by mouth 3 (three) times a day 90 tablet 0   • sertraline (ZOLOFT) 100 mg tablet Take 1 tablet (100 mg total) by mouth daily 90 tablet 0   • naloxone (NARCAN) 4 mg/0.1 mL nasal spray Administer 1 spray into a nostril. If no response after 2-3 minutes, give another dose in the other nostril using a new spray. (Patient not taking: Reported on 2/18/2025) 1 each 1     No current facility-administered medications for this visit.       Review of Systems  Video Exam    There were no vitals filed for this visit.    Physical Exam   As a result of this visit, I have referred the patient for further respiratory evaluation. No      VIRTUAL VISIT DISCLAIMER    Linette Davila acknowledges that she has consented to an online visit or consultation. She understands that the online visit is based solely on information provided by her, and that, in the absence of a face-to-face physical evaluation by the physician, the diagnosis she receives is both limited and provisional in terms of accuracy and completeness. This is not intended to replace a full medical face-to-face evaluation by the physician. Linette Lola understands and accepts these terms.    MEDICATION MANAGEMENT NOTE        [facility]    Name and Date of Birth:  Linette Davila 64 y.o. 1960 MRN: [id number]    Date of Visit: February 21, 2025    No Known Allergies  SUBJECTIVE:    Linette is seen today for a follow up for Major Depressive Disorder and Generalized Anxiety Disorder. She reports that she continues to experience on and off symptoms since the last visit.  Patient discussing recent failed medical treatments to relieve her chronic and severe back pain, nothing has improved her pain levels and she remains frustrated and anxious regarding this process.  States [facility] is [facility] treatment options.  Chronic pain continues to be a main source of her severe chronic anxiety, remains with frequent worry, racing thoughts, difficulty relaxing, worrying about the future and her pain levels.  Continue to encourage the use of coping skills, strategies learned in partial program last year.  She continues compliance with her prescribed medications of Zoloft, gabapentin, Seroquel, Klonopin which are all indicated for anxiety as well as depressive symptoms.  She uses medication appropriately and denies all adverse effects.  Depression is more controlled with no significant in his report.  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  Continue therapy for emotional regulation, coping skills and for treatment of anxiety.  Denies SI and HI.        She denies any side effects from medications.    PLAN:     -Continue Zoloft 100mg daily   PARQ completed including serotonin syndrome, SIADH, worsening depression, suicidality, induction of flaquito, GI upset, headaches, activation, sexual side effects, sedation, potential drug interactions, and others.      -Continue Gabapentin 300mg TID for anxiety, pt failed buspar in past   PARQ for neurontin including depression/suicidality, allergic reactions (SJS, angioedema, rhabdomyolysis, eosinophilia, anaphylaxis), dizziness and somnolence,  diarrhea, xerostomia, headaches, drug interactions and others.       -Continue Seroquel 100 mg TID for anxiety, insomnia   -Klonopin 1 mg 3 times daily for severe anxiety and panic- on this medication for over 20 years, using appropriately, provider recommends decreasing usage and if unable to consider entering partial program again to increase coping skills  Discussed with patient the risks of sedation, respiratory depression, impairment of ability to drive and potential for abuse and addiction related to treatment with benzodiazepine medications. The patient understands risk of treatment with benzodiazepine medications, agrees to not drive if feels impaired and agrees to take medications as prescribed. Patient was also informed of risks of being on or starting opioid medications due to drug interactions and potential for serious respiratory depression and death        Aware of 24 hour and weekend coverage for urgent situations accessed by calling [facility] main practice number  Referral for individual psychotherapy    Diagnoses and all orders for this visit:    JACOBY (generalized anxiety disorder)  -     sertraline (ZOLOFT) 100 mg tablet; Take 1 tablet (100 mg total) by mouth daily    Moderate episode of recurrent major depressive disorder (HCC)  -     QUEtiapine (SEROquel) 100 mg tablet; Take 1 tablet (100 mg total) by mouth 3 (three) times a day          Current Outpatient Medications on File Prior to Visit   Medication Sig Dispense Refill   • acetaminophen (TYLENOL) 500 mg tablet Take 500 mg by mouth every 6 (six) hours as needed for mild pain     • clonazePAM (KlonoPIN) 1 mg tablet Take 1 tablet (1 mg total) by mouth 3 (three) times a day 90 tablet 0   • diclofenac (VOLTAREN) 75 mg EC tablet Take 1 tablet (75 mg total) by mouth 2 (two) times a day as needed (moderate pain) 60 tablet 0   • dicyclomine (BENTYL) 10 mg capsule Take 1 capsule (10 mg total) by mouth 4 (four) times a day (before  meals and at bedtime) 120 capsule 5   • gabapentin (Neurontin) 300 mg capsule Take 1 capsule (300 mg total) by mouth 3 (three) times a day 270 capsule 0   • methylPREDNISolone 4 MG tablet therapy pack Use as directed on package 1 each 1   • omeprazole (PriLOSEC) 40 MG capsule Take 1 capsule (40 mg total) by mouth 2 (two) times a day 200 capsule 1   • naloxone (NARCAN) 4 mg/0.1 mL nasal spray Administer 1 spray into a nostril. If no response after 2-3 minutes, give another dose in the other nostril using a new spray. (Patient not taking: Reported on 2/18/2025) 1 each 1     No current facility-administered medications on file prior to visit.       Psychotherapy Provided:       Supportive counseling provided.  Medication changes discussed with Linette.  Medication education provided to Linette.     HPI ROS Appetite Changes and Sleep:     She reports difficulty falling asleep, adequate appetite, low energy. Denies homicidal ideation, denies suicidal ideation    Review Of Systems:      HPI ROS:               Medication Side Effects:  denies    Depression (10 worst): 2/10    Anxiety (10 worst): 8/10    Safety concerns (SI, HI, etc): Denies si and hi    Sleep: 7 hrs, better with seroquel    Energy: low    Appetite: 2 meals    Weight Change: denies        Mental Status Evaluation:    Appearance Adequate hygiene and grooming   Behavior calm and cooperative   Mood anxious and depressed  Depression Scale - 2 of 10 (0 = No depression)  Anxiety Scale - 8 of 10 (0 = No anxiety)   Speech Normal rate and volume   Affect constricted   Thought Processes Goal directed and coherent   Thought Content Does not verbalize delusional material   Associations Tightly connected   Perceptual Disturbances Denies hallucinations and does not appear to be responding to internal stimuli   Risk Potential Suicidal/Homicidal Ideation - No evidence of suicidal or homicidal ideation and patient does not verbalize suicidal or homicidal ideation  Risk of  Violence - No evidence of risk for violence found on assessment  Risk of Self Mutilation - No evidence of risk for self mutilation found on assessment   Orientation oriented to person, place, time/date and situation   Memory recent and remote memory grossly intact   Consciousness alert and awake   Attention/Concentration attention span and concentration are age appropriate   Insight intact   Judgement intact   Muscle Strength and Gait normal muscle strength and normal muscle tone, normal gait/station and normal balance   Motor Activity no abnormal movements   Language no difficulty naming common objects, no difficulty repeating a phrase, no difficulty writing a sentence   Fund of Knowledge adequate knowledge of current events  adequate fund of knowledge regarding past history  adequate fund of knowledge regarding vocabulary          Past Medical History:    Past Medical History:   Diagnosis Date   • Anxiety    • Depression 1987   • SVT (supraventricular tachycardia) (HCC)    • Varicella 1987        Past Surgical History:   Procedure Laterality Date   • CARDIAC ELECTROPHYSIOLOGY PROCEDURE N/A 05/30/2023    Procedure: Cardiac eps/svt ablation;  Surgeon: Jigar Roe MD;  Location: [facility];  Service: Cardiology   • JOINT REPLACEMENT  2010    right hip     No Known Allergies  Substance Abuse History:    Social History     Substance and Sexual Activity   Alcohol Use Not Currently     Social History     Substance and Sexual Activity   Drug Use Not Currently    Comment: medical marijuana     Social History:    Social History     Socioeconomic History   • Marital status: /Civil Union     Spouse name: Not on file   • Number of children: Not on file   • Years of education: Not on file   • Highest education level: Not on file   Occupational History   • Not on file   Tobacco Use   • Smoking status: Every Day     Current packs/day: 1.00     Average packs/day: 1 pack/day for 34.8 years (34.8 ttl pk-yrs)      Types: Cigarettes     Start date: 5/23/1990   • Smokeless tobacco: Never   Vaping Use   • Vaping status: Never Used   Substance and Sexual Activity   • Alcohol use: Not Currently   • Drug use: Not Currently     Comment: medical marijuana   • Sexual activity: Not Currently     Partners: Female, Male     Birth control/protection: Abstinence, Post-menopausal   Other Topics Concern   • Not on file   Social History Narrative   • Not on file     Social Drivers of Health     Financial Resource Strain: Not on file   Food Insecurity: Not on file   Transportation Needs: Not on file   Physical Activity: Not on file   Stress: Not on file   Social Connections: Not on file   Intimate Partner Violence: Not on file   Housing Stability: Not on file     Family Psychiatric History:     Family History   Problem Relation Age of Onset   • Depression Mother    • Anxiety disorder Mother    • Colon polyps Mother    • Hearing loss Mother    • Irritable bowel syndrome Mother    • Ulcerative colitis Mother    • Alcohol abuse Father    • Addiction problem Father    • Depression Brother    • Anxiety disorder Brother    • Colon cancer Maternal Grandmother    • Depression Maternal Grandmother    • Mental illness Maternal Grandmother    • Stomach cancer Maternal Grandmother      History Review:The following portions of the patient's history were reviewed and updated as appropriate: allergies, current medications, past family history, past medical history, past social history, past surgical history and problem list     OBJECTIVE:     Vital signs in last 24 hours:    There were no vitals filed for this visit.  Laboratory Results:   Recent Labs (last 2 months):   Appointment on 01/15/2025   Component Date Value   • WBC 01/15/2025 10.56 (H)    • RBC 01/15/2025 4.78    • Hemoglobin 01/15/2025 13.9    • Hematocrit 01/15/2025 42.8    • MCV 01/15/2025 90    • MCH 01/15/2025 29.1    • MCHC 01/15/2025 32.5    • RDW 01/15/2025 13.2    • Platelets 01/15/2025  308    • MPV 01/15/2025 11.2    • Sodium 01/15/2025 138    • Potassium 01/15/2025 4.4    • Chloride 01/15/2025 103    • CO2 01/15/2025 28    • ANION GAP 01/15/2025 7    • BUN 01/15/2025 13    • Creatinine 01/15/2025 0.78    • Glucose, Fasting 01/15/2025 93    • Calcium 01/15/2025 9.3    • AST 01/15/2025 18    • ALT 01/15/2025 12    • Alkaline Phosphatase 01/15/2025 63    • Total Protein 01/15/2025 7.2    • Albumin 01/15/2025 4.0    • Total Bilirubin 01/15/2025 0.36    • eGFR 01/15/2025 81    • Rheumatoid Factor 01/15/2025 Negative    • Cyclic Citrullinated Pep* 01/15/2025 1.1    • Anti-Ro (SS-A) Ab 01/15/2025 <0.5    • Anti-La (SS-B) Ab 01/15/2025 <0.6    • Uric Acid 01/15/2025 4.7    • CRP 01/15/2025 8.8 (H)    • Sed Rate 01/15/2025 52 (H)    • Lyme Total Antibodies 01/15/2025 Negative      I have personally reviewed all pertinent laboratory/tests results.    Suicide/Homicide Risk Assessment:    Risk of Harm to Self:  Protective Factors: no current suicidal ideation, access to mental health treatment, being , compliant with medications, compliant with mental health treatment, good self-esteem, having a desire to be alive  Based on today's assessment, Linette presents the following risk of harm to self: minimal    Risk of Harm to Others:  Based on today's assessment, Linette presents the following risk of harm to others: none    The following interventions are recommended: referral for psychotherapy    Medications Risks/Benefits:      Risks, Benefits And Possible Side Effects Of Medications:    Discussed risks and benefits of treatment with patient including risk of suicidality, serotonin syndrome, increased QTc interval and SIADH related to treatment with antidepressants; Risk of induction of manic symptoms in certain patient populations, risk of parkinsonian symptoms, metabolic syndrome, tardive dyskinesia and neuroleptic malignant syndrome related to treatment with antipsychotic medications, risks of misuse,  abuse or dependence, sedation and respiratory depression related to treatment with benzodiazepine medications, and Risk of sedation, dizziness and CNS depression during treatment with Gabapentin     Controlled Medication Discussion:     Linette has been filling controlled prescriptions on time as prescribed according to [place] Prescription Drug Monitoring Program  Discussed with Linette the risks of sedation, respiratory depression, impairment of ability to drive and potential for abuse and addiction related to treatment with benzodiazepine medications. She understands risk of treatment with benzodiazepine medications, agrees to not drive if feels impaired and agrees to take medications as prescribed  Discussed with Linette Black Box warning on concurrent use of benzodiazepines and opioid medications including sedation, respiratory depression, coma and death. She understands the risk of treatment with benzodiazepines in addition to opioids and wants to continue taking those medications  Discussed with Linette increased risk of impairment related to concurrent use of benzodiazepines and hypnotic medications including excessive sedation, psychomotor impairment and respiratory depression. She understands the risk of treatment with benzodiazepines in addition to hypnotic medications and wants to continue taking those medications    Treatment Plan:    Due for update/Updated:   no  Last treatment plan done 9/12, due 3/12/25    WLIL Oconnell 02/21/25    This note was shared with patient.      Visit Time    Visit Start Time: 2  Visit Stop Time: 223  Total Visit Duration:  23 minutes

## 2025-02-18 NOTE — ASSESSMENT & PLAN NOTE
-Continue Gabapentin 300mg TID for anxiety, pt failed buspar in past   PARQ for neurontin including depression/suicidality, allergic reactions (SJS, angioedema, rhabdomyolysis, eosinophilia, anaphylaxis), dizziness and somnolence, diarrhea, xerostomia, headaches, drug interactions and others.       -Continue Seroquel 100 mg TID for anxiety, insomnia   -Klonopin 1 mg 3 times daily for severe anxiety and panic- on this medication for over 20 years, using appropriately, provider recommends decreasing usage and if unable to consider entering partial program again to increase coping skills  Discussed with patient the risks of sedation, respiratory depression, impairment of ability to drive and potential for abuse and addiction related to treatment with benzodiazepine medications. The patient understands risk of treatment with benzodiazepine medications, agrees to not drive if feels impaired and agrees to take medications as prescribed. Patient was also informed of risks of being on or starting opioid medications due to drug interactions and potential for serious respiratory depression and death

## 2025-03-04 ENCOUNTER — OFFICE VISIT (OUTPATIENT)
Dept: FAMILY MEDICINE CLINIC | Facility: CLINIC | Age: 65
End: 2025-03-04
Payer: COMMERCIAL

## 2025-03-04 VITALS
DIASTOLIC BLOOD PRESSURE: 68 MMHG | TEMPERATURE: 99.3 F | BODY MASS INDEX: 26.52 KG/M2 | SYSTOLIC BLOOD PRESSURE: 108 MMHG | WEIGHT: 175 LBS | HEIGHT: 68 IN | HEART RATE: 91 BPM | OXYGEN SATURATION: 96 %

## 2025-03-04 DIAGNOSIS — F33.2 MDD (MAJOR DEPRESSIVE DISORDER), RECURRENT SEVERE, WITHOUT PSYCHOSIS (HCC): Chronic | ICD-10-CM

## 2025-03-04 DIAGNOSIS — Z00.00 ROUTINE HEALTH MAINTENANCE: ICD-10-CM

## 2025-03-04 DIAGNOSIS — Z00.00 ANNUAL PHYSICAL EXAM: Primary | ICD-10-CM

## 2025-03-04 DIAGNOSIS — D72.829 LEUKOCYTOSIS, UNSPECIFIED TYPE: ICD-10-CM

## 2025-03-04 DIAGNOSIS — K21.9 GASTROESOPHAGEAL REFLUX DISEASE WITHOUT ESOPHAGITIS: ICD-10-CM

## 2025-03-04 DIAGNOSIS — Z12.31 ENCOUNTER FOR SCREENING MAMMOGRAM FOR BREAST CANCER: ICD-10-CM

## 2025-03-04 DIAGNOSIS — F41.1 GAD (GENERALIZED ANXIETY DISORDER): ICD-10-CM

## 2025-03-04 DIAGNOSIS — R10.9 ABDOMINAL CRAMPING: ICD-10-CM

## 2025-03-04 DIAGNOSIS — M54.50 CHRONIC BILATERAL LOW BACK PAIN, UNSPECIFIED WHETHER SCIATICA PRESENT: ICD-10-CM

## 2025-03-04 DIAGNOSIS — Z13.220 SCREENING FOR LIPID DISORDERS: ICD-10-CM

## 2025-03-04 DIAGNOSIS — G89.29 CHRONIC BILATERAL LOW BACK PAIN, UNSPECIFIED WHETHER SCIATICA PRESENT: ICD-10-CM

## 2025-03-04 PROCEDURE — 99214 OFFICE O/P EST MOD 30 MIN: CPT

## 2025-03-04 PROCEDURE — 99386 PREV VISIT NEW AGE 40-64: CPT

## 2025-03-04 RX ORDER — DICYCLOMINE HYDROCHLORIDE 10 MG/1
10 CAPSULE ORAL
Qty: 360 CAPSULE | Refills: 2 | Status: SHIPPED | OUTPATIENT
Start: 2025-03-04

## 2025-03-04 RX ORDER — OMEPRAZOLE 40 MG/1
40 CAPSULE, DELAYED RELEASE ORAL 2 TIMES DAILY
Qty: 180 CAPSULE | Refills: 3 | Status: SHIPPED | OUTPATIENT
Start: 2025-03-04 | End: 2026-02-27

## 2025-03-04 RX ORDER — DICLOFENAC SODIUM 75 MG/1
75 TABLET, DELAYED RELEASE ORAL 2 TIMES DAILY PRN
Qty: 60 TABLET | Refills: 0 | Status: SHIPPED | OUTPATIENT
Start: 2025-03-04

## 2025-03-04 NOTE — PROGRESS NOTES
Adult Annual Physical  Name: Linette Davila      : 1960      MRN: [id number]  Encounter Provider: Jason Pelletier PA-C  Encounter Date: 3/4/2025   Encounter department: [facility]    Assessment & Plan  Annual physical exam         Chronic bilateral low back pain, unspecified whether sciatica present  Worsening for one year  Pt has apt with [facility], 3/31 to discuss non-perative and operative options  Taking diclofenac PRN an would like refills  Exam reveals tenderness to the midline low back lumbar region   Prior MRI lumbar spine reviewed personally  Will Rx Voltaren refills after discussing risk and benefits and instructed patient to only take as needed  Also recommended OTC lidocaine patches given point tenderness over the low back area  Orders:    diclofenac (VOLTAREN) 75 mg EC tablet; Take 1 tablet (75 mg total) by mouth 2 (two) times a day as needed (moderate pain)    Abdominal cramping  Chronic for the last year   Pt gets intermittent abd pain   Denies nausea vomiting, has been doing well taking dicyclomine  Exam is unremarkable, refills provided  Advise follow-up as needed for worsening or nonimprovement, has been seen by GI  Orders:    dicyclomine (BENTYL) 10 mg capsule; Take 1 capsule (10 mg total) by mouth 4 (four) times a day (before meals and at bedtime)    Leukocytosis, unspecified type  Review of recent lab work and subsequent past blood work shows continued and consistent mild to moderate elevations in white blood cell counts tracing as far back as   No differentials were ordered recently, labs placed  Exam unremarkable  Given consistent elevations, will place hematology referral to further evaluation   Orders:    Ambulatory Referral to Hematology / Oncology; Future    CBC and differential; Future    Gastroesophageal reflux disease without esophagitis  Pt has chronic history of reflux  Taking omeprazole 40 mg, patient states she has been on this  medication for years  Good control of symptoms with this medication  Did discuss with patient that we may consider a trial of discontinuing medication given potential for bone density issues with chronic and consistent usage  Will refill, consideration of trial of cessation of medication in the future  Orders:    omeprazole (PriLOSEC) 40 MG capsule; Take 1 capsule (40 mg total) by mouth 2 (two) times a day    JACOBY (generalized anxiety disorder)  Follows with psych        MDD (major depressive disorder), recurrent severe, without psychosis (HCC)  Follows with psych       Encounter for screening mammogram for breast cancer    Orders:    Mammo screening bilateral w 3d and cad; Future    Screening for lipid disorders    Orders:    Lipid panel; Future    Routine health maintenance    Orders:    CBC and differential; Future    Immunizations and preventive care screenings were discussed with patient today. Appropriate education was printed on patient's after visit summary.    Counseling:  Dental Health: discussed importance of regular tooth brushing, flossing, and dental visits.  Exercise: the importance of regular exercise/physical activity was discussed. Recommend exercise 3-5 times per week for at least 30 minutes.       Tobacco Cessation Counseling: Tobacco cessation counseling was provided. The patient is sincerely urged to quit consumption of tobacco. She is ready to quit tobacco. Medication options and side effects of medication discussed. Patient agreed to medication.         History of Present Illness     Adult Annual Physical:  Patient presents for annual physical. Linette Davila is a 64 y.o. female  to establish care and is also due for annual, she would like refills on her medications.     vaccines, care gaps, screenings, routine labs, relevant lab work and imaging, reviewed at this visit. Denies vaccination and assistance with smoking cessation.     **Note: Portions of the record may have been created with  "voice recognition software.  Occasional wrong word or \"sound alike\" substitutions may have occurred due to the inherent limitations of voice recognition software.  Please read the chart carefully and recognize, using context, where substitutions have occurred. Please contact for further clarification, when necessary.  .     Diet and Physical Activity:  - Diet/Nutrition: well balanced diet.  - Exercise: no formal exercise, strength training exercises and vigorous cardiovascular exercise.    General Health:  - Sleep: sleeps well.  - Hearing: normal hearing right ear and normal hearing left ear.  - Vision: no vision problems.  - Dental:. dentures    /GYN Health:  - Follows with GYN: yes.     Review of Systems   Constitutional:  Negative for chills and fever.   HENT:  Negative for ear pain and sore throat.    Eyes:  Negative for pain and visual disturbance.   Respiratory:  Negative for cough and shortness of breath.    Cardiovascular:  Negative for chest pain and palpitations.   Gastrointestinal:  Negative for abdominal pain and vomiting.   Genitourinary:  Negative for dysuria and hematuria.   Musculoskeletal:  Positive for arthralgias and back pain.   Skin:  Negative for color change and rash.   Neurological:  Negative for seizures and syncope.   All other systems reviewed and are negative.    Medical History Reviewed by provider this encounter:     .  Current Outpatient Medications on File Prior to Visit   Medication Sig Dispense Refill    acetaminophen (TYLENOL) 500 mg tablet Take 500 mg by mouth every 6 (six) hours as needed for mild pain      clonazePAM (KlonoPIN) 1 mg tablet Take 1 tablet (1 mg total) by mouth 3 (three) times a day 90 tablet 0    diclofenac (VOLTAREN) 75 mg EC tablet Take 1 tablet (75 mg total) by mouth 2 (two) times a day as needed (moderate pain) 60 tablet 0    dicyclomine (BENTYL) 10 mg capsule Take 1 capsule (10 mg total) by mouth 4 (four) times a day (before meals and at bedtime) 120 " capsule 5    gabapentin (Neurontin) 300 mg capsule Take 1 capsule (300 mg total) by mouth 3 (three) times a day 270 capsule 0    methylPREDNISolone 4 MG tablet therapy pack Use as directed on package 1 each 1    omeprazole (PriLOSEC) 40 MG capsule Take 1 capsule (40 mg total) by mouth 2 (two) times a day 200 capsule 1    QUEtiapine (SEROquel) 100 mg tablet Take 1 tablet (100 mg total) by mouth 3 (three) times a day 90 tablet 0    sertraline (ZOLOFT) 100 mg tablet Take 1 tablet (100 mg total) by mouth daily 90 tablet 0    [DISCONTINUED] naloxone (NARCAN) 4 mg/0.1 mL nasal spray Administer 1 spray into a nostril. If no response after 2-3 minutes, give another dose in the other nostril using a new spray. (Patient not taking: Reported on 2/18/2025) 1 each 1     No current facility-administered medications on file prior to visit.      Social History     Tobacco Use    Smoking status: Every Day     Current packs/day: 1.00     Average packs/day: 1 pack/day for 34.8 years (34.8 ttl pk-yrs)     Types: Cigarettes     Start date: 5/23/1990    Smokeless tobacco: Never   Vaping Use    Vaping status: Never Used   Substance and Sexual Activity    Alcohol use: Not Currently    Drug use: Not Currently     Comment: medical marijuana    Sexual activity: Not Currently     Partners: Female, Male     Birth control/protection: Abstinence, Post-menopausal       Objective   There were no vitals taken for this visit.    Physical Exam  Vitals and nursing note reviewed.   Constitutional:       General: She is not in acute distress.     Appearance: She is well-developed.   HENT:      Head: Normocephalic and atraumatic.      Right Ear: Tympanic membrane normal.      Left Ear: Tympanic membrane normal.      Nose: Nose normal.      Mouth/Throat:      Pharynx: Oropharynx is clear.   Eyes:      Conjunctiva/sclera: Conjunctivae normal.      Pupils: Pupils are equal, round, and reactive to light.   Cardiovascular:      Rate and Rhythm: Normal rate and  regular rhythm.      Heart sounds: No murmur heard.  Pulmonary:      Effort: Pulmonary effort is normal. No respiratory distress.      Breath sounds: Normal breath sounds. No wheezing.   Abdominal:      General: Abdomen is flat.   Musculoskeletal:         General: Tenderness (lumbar spine midline to palpation) present. No swelling.      Cervical back: Neck supple.   Skin:     General: Skin is warm and dry.   Neurological:      Mental Status: She is alert and oriented to person, place, and time.   Psychiatric:         Mood and Affect: Mood normal.

## 2025-03-05 ENCOUNTER — PATIENT MESSAGE (OUTPATIENT)
Dept: FAMILY MEDICINE CLINIC | Facility: CLINIC | Age: 65
End: 2025-03-05

## 2025-03-05 DIAGNOSIS — F41.1 GAD (GENERALIZED ANXIETY DISORDER): ICD-10-CM

## 2025-03-05 NOTE — PATIENT COMMUNICATION
Pt called in to requested additional blood work, per hematologies request. Triage nurse researched through encounters and read back PCPs message in regards to already ordering additional labs. Pt aware and will go have them drawn.

## 2025-03-05 NOTE — TELEPHONE ENCOUNTER
Reason for call:   [x] Refill   [] Prior Auth  [] Other:     Office:   [] PCP/Provider -   [x] Specialty/Provider - PSYC    Medication: clonazePAM (KlonoPIN) 1 mg tablet     Dose/Frequency:  Take 1 tablet (1 mg total) by mouth 3 (three) times a day     Quantity: 90    Pharmacy: [id number] - [place] - [facility] [employer] [facility]     Does the patient have enough for 3 days?   [x] Yes   [] No - Send as HP to POD

## 2025-03-05 NOTE — TELEPHONE ENCOUNTER
Refill cannot be delegated    1 [id number] 02/05/2025 02/05/2025 clonazePAM (Tablet) 90.0 30 1 MG [facility] Commercial [place] 0 / 0 [place]   2 [id number] 01/08/2025 01/08/2025 clonazePAM (Tablet) 90.0 30 1 MG [facility] Commercial Insurance 0 / 0 [place]   1 [id number] 12/20/2024 12/20/2024 traMADol HCL 50 MG TABLET (Tablet) 20.0 10 50 MG 20.0 [facility] Commercial Insurance 0 / 0 [place]   1 [id number] 12/17/2024 12/17/2024 clonazePAM (Tablet) 45.0 15 1 MG [facility] Commercial Insurance 0 / 0 [place]   1 [id number] 12/04/2024 12/04/2024 clonazePAM (Tablet) 45.0 15 1 MG [facility] Commercial Insurance 0 / 0 [place]   1 [id number] 11/06/2024 11/06/2024 clonazePAM (Tablet) 90.0 30 1 MG NA [facility]ABIL [facility] Commercial Insurance 0 / 0 [place]   1 [id number] 10/07/2024 10/07/2024 clonazePAM (Tablet) 90.0 30 1 MG [facility] Commercial Insurance 0 / 0 [place]   1 [id number] 09/06/2024 09/06/2024 clonazePAM (Tablet) 90.0 30 1 MG NA [facility] Commercial Insurance 0 / 0 [place]   1 [id number] 08/06/2024 08/06/2024 clonazePAM (Tablet) 90.0 30 1 MG NA [facility] Commercial Insurance 0 / 0 [place]   1 [id number] 07/05/2024 07/05/2024 clonazePAM (Tablet) 90.0 30 1 MG NA [facility] Commercial Insurance 0 / 0 [place]

## 2025-03-07 RX ORDER — CLONAZEPAM 1 MG/1
1 TABLET ORAL 3 TIMES DAILY
Qty: 90 TABLET | Refills: 0 | Status: SHIPPED | OUTPATIENT
Start: 2025-03-07 | End: 2025-04-06

## 2025-03-14 ENCOUNTER — LAB (OUTPATIENT)
Dept: LAB | Facility: CLINIC | Age: 65
End: 2025-03-14
Payer: COMMERCIAL

## 2025-03-14 DIAGNOSIS — D72.829 LEUKOCYTOSIS, UNSPECIFIED TYPE: ICD-10-CM

## 2025-03-14 DIAGNOSIS — Z00.00 ROUTINE HEALTH MAINTENANCE: ICD-10-CM

## 2025-03-14 DIAGNOSIS — Z13.220 SCREENING FOR LIPID DISORDERS: ICD-10-CM

## 2025-03-14 LAB
BASOPHILS # BLD AUTO: 0.05 THOUSANDS/ÂΜL (ref 0–0.1)
BASOPHILS NFR BLD AUTO: 1 % (ref 0–1)
CHOLEST SERPL-MCNC: 237 MG/DL (ref ?–200)
EOSINOPHIL # BLD AUTO: 0.19 THOUSAND/ÂΜL (ref 0–0.61)
EOSINOPHIL NFR BLD AUTO: 2 % (ref 0–6)
ERYTHROCYTE [DISTWIDTH] IN BLOOD BY AUTOMATED COUNT: 13.8 % (ref 11.6–15.1)
HCT VFR BLD AUTO: 44.2 % (ref 34.8–46.1)
HDLC SERPL-MCNC: 43 MG/DL
HGB BLD-MCNC: 13.9 G/DL (ref 11.5–15.4)
IMM GRANULOCYTES # BLD AUTO: 0.03 THOUSAND/UL (ref 0–0.2)
IMM GRANULOCYTES NFR BLD AUTO: 0 % (ref 0–2)
LDLC SERPL CALC-MCNC: 152 MG/DL (ref 0–100)
LYMPHOCYTES # BLD AUTO: 2.54 THOUSANDS/ÂΜL (ref 0.6–4.47)
LYMPHOCYTES NFR BLD AUTO: 23 % (ref 14–44)
MCH RBC QN AUTO: 28.5 PG (ref 26.8–34.3)
MCHC RBC AUTO-ENTMCNC: 31.4 G/DL (ref 31.4–37.4)
MCV RBC AUTO: 91 FL (ref 82–98)
MONOCYTES # BLD AUTO: 0.93 THOUSAND/ÂΜL (ref 0.17–1.22)
MONOCYTES NFR BLD AUTO: 8 % (ref 4–12)
NEUTROPHILS # BLD AUTO: 7.29 THOUSANDS/ÂΜL (ref 1.85–7.62)
NEUTS SEG NFR BLD AUTO: 66 % (ref 43–75)
NONHDLC SERPL-MCNC: 194 MG/DL
NRBC BLD AUTO-RTO: 0 /100 WBCS
PLATELET # BLD AUTO: 253 THOUSANDS/UL (ref 149–390)
PMV BLD AUTO: 11 FL (ref 8.9–12.7)
RBC # BLD AUTO: 4.88 MILLION/UL (ref 3.81–5.12)
TRIGL SERPL-MCNC: 210 MG/DL (ref ?–150)
WBC # BLD AUTO: 11.03 THOUSAND/UL (ref 4.31–10.16)

## 2025-03-14 PROCEDURE — 80061 LIPID PANEL: CPT

## 2025-03-14 PROCEDURE — 85025 COMPLETE CBC W/AUTO DIFF WBC: CPT

## 2025-03-14 PROCEDURE — 36415 COLL VENOUS BLD VENIPUNCTURE: CPT

## 2025-03-18 DIAGNOSIS — E78.5 DYSLIPIDEMIA: Primary | ICD-10-CM

## 2025-03-18 RX ORDER — ATORVASTATIN CALCIUM 10 MG/1
10 TABLET, FILM COATED ORAL DAILY
Qty: 100 TABLET | Refills: 2 | Status: SHIPPED | OUTPATIENT
Start: 2025-03-18

## 2025-03-19 ENCOUNTER — OFFICE VISIT (OUTPATIENT)
Dept: HEMATOLOGY ONCOLOGY | Facility: CLINIC | Age: 65
End: 2025-03-19
Payer: COMMERCIAL

## 2025-03-19 VITALS
HEIGHT: 68 IN | OXYGEN SATURATION: 94 % | TEMPERATURE: 98.3 F | WEIGHT: 174 LBS | BODY MASS INDEX: 26.37 KG/M2 | RESPIRATION RATE: 18 BRPM | SYSTOLIC BLOOD PRESSURE: 124 MMHG | HEART RATE: 73 BPM | DIASTOLIC BLOOD PRESSURE: 80 MMHG

## 2025-03-19 DIAGNOSIS — D72.829 LEUKOCYTOSIS, UNSPECIFIED TYPE: Primary | ICD-10-CM

## 2025-03-19 PROCEDURE — 99203 OFFICE O/P NEW LOW 30 MIN: CPT | Performed by: PHYSICIAN ASSISTANT

## 2025-03-19 NOTE — PATIENT INSTRUCTIONS
Follow-up with PCP  Return to our office with any persistently rising white blood count, development of anemia or low platelet count and/or if you are experiencing any drenching night sweats, unexplained fevers, significant fatigue affecting your ability to perform daily activities, enlarged lymph nodes/new lumps or bumps or unintentional weight loss

## 2025-03-19 NOTE — PROGRESS NOTES
Name: Linette Davila      : 1960      MRN: [id number]  Encounter Provider: Mary Sanchez PA-C  Encounter Date: 3/19/2025   Encounter department: [facility] [place]  :  Assessment & Plan  Leukocytosis, unspecified type  We discussed the possible etiologies of leukocytosis including primary leukocytosis which entails myeloproliferative disorders and secondary leukocytosis which is more common and seen with infections, inflammation, vasculitis, autoimmune disease, medication and smoking.     Her chronic leukocytosis has been present since at least 2017.  Patient does report history of longstanding leukocytosis prior to this.  CT CAP 6m ago did not demonstrate splenomegaly or pathologic lymphadenopathy. I suspect that persistent elevation is secondary to smoking and/or inflammatory given elevated inflammatory markers secondary to chronic back issues, abdominal pain.     She feels damp at night without any drenching night sweats or other constitutional symptoms.  Patient offered workup for MPN versus continued observation of her symptoms and CBC by PCP.  She is in favor of the latter.     Patient will be referred back to PCP for ongoing observation.  CBC should be performed every 6 to 12 months.  Patient should be referred back to our office with persistently worsening leukocytosis, development of other cytopenias and/or constitutional symptoms    Continue age related cancer screenings  I sent a message to her gastroenterologist to see if EUS is still indicated given prior findings of small bowel mass.     RTC PRN   Orders:    Ambulatory Referral to Hematology / Oncology    I have spent a total time of 24 minutes in caring for this patient on the day of the visit/encounter including Patient and family education, Documenting in the medical record, Reviewing/placing orders in the medical record (including tests, medications, and/or procedures), Obtaining or  reviewing history  , and Communicating with other healthcare professionals .      Return if symptoms worsen or fail to improve.    History of Present Illness   Chief Complaint   Patient presents with    Consult     64-year-old female with history of anxiety disorder, major depressive disorder, anxiety, tobacco use who has been referred by PCP for leukocytosis.    On chart review she has had a persistent leukocytosis dating back to at least 2017 with WBC fluctuating between 11 and 16,000.  Patient reports historically she has had leukocytosis for many years prior to this at least since around 2010 as far she can remember.  She suffers from chronic back pain and for the past several months has been having chronic abdominal pain.  She has tried multiple modalities for her back pain and she is going to see [facility] for further evaluation and management.  She is following with GI for epigastric pain.  She recently had EGD in 2023 that showed small bowel lesion s/p biopsies that were benign.  Also had colon polyps on colonoscopy.  Per last GI office note, planning for EUS however was never performed.  Patient states she was not aware this had been ordered.  She denies any history of autoimmune disease or polyarthralgia.  No fever, unintentional weight loss, significant fatigue, lymphadenopathy.  She does report feeling damp at night frequently for the past 2 months since she has been using her coal stove in the home.  Night sweats are not drenching.   She admits to current tobacco use 1 pack/day for greater than 20 years.    Pertinent Medical History     03/19/25: as below      Review of Systems   Constitutional:  Negative for fatigue, fever and unexpected weight change.        She is having night sweats for past 2 months. She is damp at night, non-drenching night sweats, occurs every night. She has coal burning stove.    Eyes:  Negative for visual disturbance.   Respiratory:  Negative for cough and shortness of  "breath.    Cardiovascular:  Negative for chest pain.   Gastrointestinal:  Negative for nausea and vomiting.        Epigastric pain daily for past several months. Pain is worse in the morning. She sometimes will have diarrhea. No indigestion    Genitourinary:  Negative for vaginal bleeding.   Musculoskeletal:  Positive for back pain (chronic for years).   Skin:  Negative for rash.   Neurological:  Positive for headaches (occasional for past couple of months).   Hematological:  Negative for adenopathy. Does not bruise/bleed easily.   All other systems reviewed and are negative.          Objective   /80 (BP Location: Left arm, Patient Position: Sitting, Cuff Size: Adult)   Pulse 73   Temp 98.3 °F (36.8 °C) (Temporal)   Resp 18   Ht 5' 8\" (1.727 m)   Wt 78.9 kg (174 lb)   SpO2 94%   BMI 26.46 kg/m²     Physical Exam  Vitals reviewed.   HENT:      Head: Normocephalic.   Cardiovascular:      Rate and Rhythm: Normal rate and regular rhythm.      Heart sounds: Normal heart sounds.   Pulmonary:      Effort: Pulmonary effort is normal.      Breath sounds: Normal breath sounds.   Abdominal:      Palpations: Abdomen is soft.      Tenderness: There is abdominal tenderness in the epigastric area.   Musculoskeletal:      Cervical back: Neck supple.   Lymphadenopathy:      Cervical: No cervical adenopathy.      Upper Body:      Right upper body: No supraclavicular or axillary adenopathy.      Left upper body: No supraclavicular or axillary adenopathy.   Skin:     Findings: No rash.   Neurological:      Mental Status: She is alert.         Labs: I have reviewed the following labs:  Results for orders placed or performed in visit on 03/14/25   Lipid panel   Result Value Ref Range    Cholesterol 237 (H) See Comment mg/dL    Triglycerides 210 (H) See Comment mg/dL    HDL, Direct 43 (L) >=50 mg/dL    LDL Calculated 152 (H) 0 - 100 mg/dL    Non-HDL-Chol (CHOL-HDL) 194 mg/dl   CBC and differential   Result Value Ref Range    " WBC 11.03 (H) 4.31 - 10.16 Thousand/uL    RBC 4.88 3.81 - 5.12 Million/uL    Hemoglobin 13.9 11.5 - 15.4 g/dL    Hematocrit 44.2 34.8 - 46.1 %    MCV 91 82 - 98 fL    MCH 28.5 26.8 - 34.3 pg    MCHC 31.4 31.4 - 37.4 g/dL    RDW 13.8 11.6 - 15.1 %    MPV 11.0 8.9 - 12.7 fL    Platelets 253 149 - 390 Thousands/uL    nRBC 0 /100 WBCs    Segmented % 66 43 - 75 %    Immature Grans % 0 0 - 2 %    Lymphocytes % 23 14 - 44 %    Monocytes % 8 4 - 12 %    Eosinophils Relative 2 0 - 6 %    Basophils Relative 1 0 - 1 %    Absolute Neutrophils 7.29 1.85 - 7.62 Thousands/µL    Absolute Immature Grans 0.03 0.00 - 0.20 Thousand/uL    Absolute Lymphocytes 2.54 0.60 - 4.47 Thousands/µL    Absolute Monocytes 0.93 0.17 - 1.22 Thousand/µL    Eosinophils Absolute 0.19 0.00 - 0.61 Thousand/µL    Basophils Absolute 0.05 0.00 - 0.10 Thousands/µL

## 2025-03-24 ENCOUNTER — TELEPHONE (OUTPATIENT)
Age: 65
End: 2025-03-24

## 2025-03-24 DIAGNOSIS — R10.13 EPIGASTRIC PAIN: Primary | ICD-10-CM

## 2025-03-24 NOTE — TELEPHONE ENCOUNTER
Patients GI provider:  Dr. Verdugo    Number to return call: [phone]    Reason for call: Pt called in regard to scheduling an EUS.    Scheduled procedure/appointment date if applicable: 4/1/2025

## 2025-03-25 ENCOUNTER — TELEPHONE (OUTPATIENT)
Dept: GASTROENTEROLOGY | Facility: CLINIC | Age: 65
End: 2025-03-25

## 2025-03-25 NOTE — TELEPHONE ENCOUNTER
----- Message from Demi Verdugo MD sent at 3/24/2025  3:56 PM EDT -----  Hi. Can we please schedule patient for non-urgent EUS for duodenal lesion. Thanks.     -Demi

## 2025-03-25 NOTE — TELEPHONE ENCOUNTER
Referring physician : Lucina    Diagnosis : duodenal lesion    Discussed with patient : YES/NO: yes    Symptoms : abdominal cramping, diarrhea    Labs : Recent CBC and CMP unremarkable except mild leukocytosis    Imaging : CT c/a/p with IV contrast 9/2024: Small fat-containing umbilical hernia. Mild hepatomegaly. Minimal hepatic steatosis. No focal lesions.    Prior endoscopy : EGD 12/2023: Normal esophagus, small type I hiatal hernia, normal stomach, normal duodenal bulb.  Single submucosal lesion measuring 15 mm in the bottom.  Superficial biopsy benign.    Anticoagulation/ Antiplatelet therapy :  none    Assessment :   1.  Duodenal lesion    Plan :   1.  EUS    Attending review :   Reviewed by : Radha Olivia  Comments :     Order placed : YES/NO: yes    Informed scheduling staff : YES/NO: yes

## 2025-04-01 ENCOUNTER — HOSPITAL ENCOUNTER (OUTPATIENT)
Dept: ULTRASOUND IMAGING | Facility: HOSPITAL | Age: 65
Discharge: HOME | End: 2025-04-01
Attending: INTERNAL MEDICINE
Payer: COMMERCIAL

## 2025-04-01 DIAGNOSIS — R10.13 EPIGASTRIC PAIN: ICD-10-CM

## 2025-04-01 PROCEDURE — 76705 ECHO EXAM OF ABDOMEN: CPT

## 2025-04-03 ENCOUNTER — RESULTS FOLLOW-UP (OUTPATIENT)
Dept: GASTROENTEROLOGY | Facility: CLINIC | Age: 65
End: 2025-04-03

## 2025-04-04 DIAGNOSIS — F41.1 GAD (GENERALIZED ANXIETY DISORDER): ICD-10-CM

## 2025-04-04 RX ORDER — CLONAZEPAM 1 MG/1
1 TABLET ORAL 3 TIMES DAILY
Qty: 90 TABLET | Refills: 0 | Status: SHIPPED | OUTPATIENT
Start: 2025-04-04 | End: 2025-05-04

## 2025-04-04 NOTE — TELEPHONE ENCOUNTER
03/07/2025 03/07/2025 clonazePAM (Tablet) 90.0 30 1 MG NA NABIL DEL CID [facility] Commercial Insurance 0 / 0 [place]   1 [id number] 02/05/2025 02/05/2025 clonazePAM (Tablet) 90.0 30 1 MG NA NABIL [facility] Commercial Insurance 0 / 0 [place]   2 [id number] 01/08/2025 01/08/2025 clonazePAM (Tablet) 90.0 30 1 MG NA NABIL [facility] Commercial Insurance 0 / 0 [place]   1 [id number] 12/20/2024 12/20/2024 traMADol HCL 50 MG TABLET (Tablet) 20.0 10 50 MG 20.0 RENETTA ADORE [facility] Commercial Insurance 0 / 0 [place]   1 [id number] 12/17/2024 12/17/2024 clonazePAM (Tablet) 45.0 15 1 MG [facility] Commercial Insurance 0 / 0 [place]   1 [id number] 12/04/2024 12/04/2024 clonazePAM (Tablet) 45.0 15 1 MG NA [facility]STINI [facility]AV[facility] Commercial Insurance 0 / 0 [place]   1 [id number] 11/06/2024 11/06/2024 clonazePAM (Tablet)

## 2025-04-04 NOTE — TELEPHONE ENCOUNTER
Reason for call:   [x] Refill   [] Prior Auth  [] Other:     Office:   [] PCP/Provider -   [x] Specialty/Provider -  Jabier Wilson,  / jerry tomlinson    Medication: clonazePAM (KlonoPIN) 1 mg tablet     Dose/Frequency: Take 1 tablet (1 mg total) by mouth 3 (three) times a day,     Quantity: 90    Pharmacy: [id number] [place], [place] - [address] [place] [address]   Does the patient have enough for 3 days?   [] Yes   [x] No - Send as HP to POD

## 2025-04-11 NOTE — PROGRESS NOTES
Assessment:  1. Chronic midline low back pain without sciatica    2. Degeneration of intervertebral disc of lumbar region with discogenic back pain    3. Vertebrogenic low back pain        Plan:    65-year-old female returns for office with ongoing lower back pain despite multiple interventions.  She does not appear to be a surgical candidate at this time.  Still has ongoing pain with prolonged sitting and centralized in the lumbar region.  Appears to be vertebrogenic in nature.  She does have accompanying Modic changes.  We discussed Intracept procedure and she would like to find someone with more experience with this procedure.  We will try to help her find someone locally.    [place] Prescription Drug Monitoring Program report was reviewed and was appropriate     Complete risks and benefits including bleeding, infection, tissue reaction, nerve injury and allergic reaction were discussed. The approach was demonstrated using models and literature was provided. Verbal and written consent was obtained.    My impressions and treatment recommendations were discussed in detail with the patient who verbalized understanding and had no further questions.  Discharge instructions were provided. I personally saw and examined the patient and I agree with the above discussed plan of care.    No orders of the defined types were placed in this encounter.    No orders of the defined types were placed in this encounter.      History of Present Illness:  Linette Davila is a 65 y.o. female who presents for a follow up office visit in regards to Back Pain (B/L lower back into the buttocks).   The patient’s current symptoms include midline low back pain.     She returns after several visits with Dr. Holland and after telemedicine with another neurosurgical group.  Current pain score is 8 out of 10.  Same location.  Morning evening and night are the worst.  Is constant, dull/aching, throbbing.  Pain is still midline lumbar  region.        I have personally reviewed and/or updated the patient's past medical history, past surgical history, family history, social history, current medications, allergies, and vital signs today.     Review of Systems   Gastrointestinal:  Positive for diarrhea and nausea.       Patient Active Problem List   Diagnosis    JACOBY (generalized anxiety disorder)    Smoking    MDD (major depressive disorder), recurrent severe, without psychosis (HCC)    PROVISIONAL Body dysmorphic disorder    Panic disorder without agoraphobia    Social anxiety disorder    Postmenopausal bleeding    UTI symptoms       Past Medical History:   Diagnosis Date    Anxiety     Brain concussion 2015    Cervical disc disorder 1987    Depression 1987    Low back pain 2020    Pain has become progressively  worse    Lumbosacral disc disease 1987    SVT (supraventricular tachycardia) (HCC)     Varicella 1987       Past Surgical History:   Procedure Laterality Date    CARDIAC ELECTROPHYSIOLOGY PROCEDURE N/A 05/30/2023    Procedure: Cardiac eps/svt ablation;  Surgeon: Jigar Roe MD;  Location: [facility];  Service: Cardiology    HIP SURGERY  2010    JOINT REPLACEMENT  2010    right hip       Family History   Problem Relation Age of Onset    Depression Mother     Anxiety disorder Mother     Colon polyps Mother     Hearing loss Mother     Irritable bowel syndrome Mother     Ulcerative colitis Mother     Alcohol abuse Father     Addiction problem Father     Depression Brother     Anxiety disorder Brother     Colon cancer Maternal Grandmother     Depression Maternal Grandmother     Mental illness Maternal Grandmother     Stomach cancer Maternal Grandmother     Cancer Maternal Grandmother     Psychiatric Illness Maternal Grandmother     Suicide Attempts Maternal Grandmother     Completed Suicide  Maternal Grandfather        Social History     Occupational History    Not on file   Tobacco Use    Smoking status: Every Day     Current  "packs/day: 1.00     Average packs/day: 1 pack/day for 34.9 years (34.9 ttl pk-yrs)     Types: Cigarettes     Start date: 5/23/1990    Smokeless tobacco: Never   Vaping Use    Vaping status: Some Days    Substances: Nicotine, Flavoring   Substance and Sexual Activity    Alcohol use: Not Currently    Drug use: Not Currently     Comment: medical marijuana    Sexual activity: Not Currently     Partners: Female     Birth control/protection: Abstinence       Current Outpatient Medications on File Prior to Visit   Medication Sig    acetaminophen (TYLENOL) 500 mg tablet Take 500 mg by mouth every 6 (six) hours as needed for mild pain    atorvastatin (LIPITOR) 10 mg tablet Take 1 tablet (10 mg total) by mouth daily    clonazePAM (KlonoPIN) 1 mg tablet Take 1 tablet (1 mg total) by mouth 3 (three) times a day    diclofenac (VOLTAREN) 75 mg EC tablet Take 1 tablet (75 mg total) by mouth 2 (two) times a day as needed (moderate pain)    dicyclomine (BENTYL) 10 mg capsule Take 1 capsule (10 mg total) by mouth 4 (four) times a day (before meals and at bedtime)    gabapentin (Neurontin) 300 mg capsule Take 1 capsule (300 mg total) by mouth 3 (three) times a day    omeprazole (PriLOSEC) 40 MG capsule Take 1 capsule (40 mg total) by mouth 2 (two) times a day    QUEtiapine (SEROquel) 100 mg tablet Take 1 tablet (100 mg total) by mouth 3 (three) times a day    sertraline (ZOLOFT) 100 mg tablet Take 1 tablet (100 mg total) by mouth daily     No current facility-administered medications on file prior to visit.       No Known Allergies    Physical Exam:    Ht 5' 8\" (1.727 m)   Wt 77.1 kg (170 lb)   BMI 25.85 kg/m²     Constitutional:normal, well developed, well nourished, alert, in no distress and non-toxic and no overt pain behavior.  Eyes:anicteric  HEENT:grossly intact  Neck:supple, symmetric, trachea midline and no masses   Pulmonary:even and unlabored  Cardiovascular:No edema or pitting edema present  Skin:Normal without rashes or " lesions and well hydrated  Psychiatric:Mood and affect appropriate  Neurologic:Cranial Nerves II-XII grossly intact  Musculoskeletal:normal    Imaging

## 2025-04-14 ENCOUNTER — OFFICE VISIT (OUTPATIENT)
Dept: PAIN MEDICINE | Facility: CLINIC | Age: 65
End: 2025-04-14
Payer: COMMERCIAL

## 2025-04-14 VITALS — BODY MASS INDEX: 25.76 KG/M2 | HEIGHT: 68 IN | WEIGHT: 170 LBS

## 2025-04-14 DIAGNOSIS — M51.360 DEGENERATION OF INTERVERTEBRAL DISC OF LUMBAR REGION WITH DISCOGENIC BACK PAIN: ICD-10-CM

## 2025-04-14 DIAGNOSIS — G89.29 CHRONIC MIDLINE LOW BACK PAIN WITHOUT SCIATICA: Primary | ICD-10-CM

## 2025-04-14 DIAGNOSIS — M54.50 CHRONIC MIDLINE LOW BACK PAIN WITHOUT SCIATICA: Primary | ICD-10-CM

## 2025-04-14 DIAGNOSIS — M54.51 VERTEBROGENIC LOW BACK PAIN: ICD-10-CM

## 2025-04-14 PROCEDURE — 99214 OFFICE O/P EST MOD 30 MIN: CPT | Performed by: STUDENT IN AN ORGANIZED HEALTH CARE EDUCATION/TRAINING PROGRAM

## 2025-04-16 ENCOUNTER — TELEPHONE (OUTPATIENT)
Age: 65
End: 2025-04-16

## 2025-04-16 NOTE — TELEPHONE ENCOUNTER
Patients GI provider:  Dr. Verdugo    Number to return call: [phone]    Reason for call: Pt calling as none of the medication  prescribed such as omeprazole or bentyl aren't effective . She is still experiencing abdominal pain . Pt would like to know if  recommendes another medication or what should she do ? As she is scheduled for her Ultrasound but it still in pain     Scheduled procedure/appointment date if applicable: Apt/procedure 4/22/2025

## 2025-04-17 DIAGNOSIS — F33.1 MODERATE EPISODE OF RECURRENT MAJOR DEPRESSIVE DISORDER (HCC): ICD-10-CM

## 2025-04-17 RX ORDER — QUETIAPINE FUMARATE 100 MG/1
100 TABLET, FILM COATED ORAL 2 TIMES DAILY
Start: 2025-04-17

## 2025-04-17 RX ORDER — TRAZODONE HYDROCHLORIDE 50 MG/1
50 TABLET ORAL
Qty: 30 TABLET | Refills: 1 | Status: SHIPPED | OUTPATIENT
Start: 2025-04-17

## 2025-04-17 NOTE — TELEPHONE ENCOUNTER
"I spoke directly with patient, she never had the Gastric Emptying study done that Dr Verdugo ordered on her last visit.   I asked it there was a particular reason, she mentioned she has had \"too many tests\".   She will be calling Gastro call center to check which office has first available appointment with Dr Verdugo  "

## 2025-04-17 NOTE — TELEPHONE ENCOUNTER
Please tell him sorry that she is in pain.  She should schedule follow-up in the office to discuss her symptoms further and we can consider further testing.

## 2025-04-21 DIAGNOSIS — F33.1 MODERATE EPISODE OF RECURRENT MAJOR DEPRESSIVE DISORDER (HCC): ICD-10-CM

## 2025-04-22 ENCOUNTER — ANESTHESIA (OUTPATIENT)
Dept: GASTROENTEROLOGY | Facility: HOSPITAL | Age: 65
End: 2025-04-22
Payer: COMMERCIAL

## 2025-04-22 ENCOUNTER — HOSPITAL ENCOUNTER (OUTPATIENT)
Dept: GASTROENTEROLOGY | Facility: HOSPITAL | Age: 65
Setting detail: OUTPATIENT SURGERY
Discharge: HOME | End: 2025-04-22
Attending: INTERNAL MEDICINE
Payer: COMMERCIAL

## 2025-04-22 ENCOUNTER — ANESTHESIA EVENT (OUTPATIENT)
Dept: GASTROENTEROLOGY | Facility: HOSPITAL | Age: 65
End: 2025-04-22
Payer: COMMERCIAL

## 2025-04-22 VITALS
BODY MASS INDEX: 25.76 KG/M2 | WEIGHT: 170 LBS | RESPIRATION RATE: 18 BRPM | SYSTOLIC BLOOD PRESSURE: 110 MMHG | TEMPERATURE: 97.1 F | HEART RATE: 68 BPM | OXYGEN SATURATION: 95 % | DIASTOLIC BLOOD PRESSURE: 55 MMHG | HEIGHT: 68 IN

## 2025-04-22 DIAGNOSIS — K31.9 SUBEPITHELIAL LESION OF DUODENUM: ICD-10-CM

## 2025-04-22 PROCEDURE — 43259 EGD US EXAM DUODENUM/JEJUNUM: CPT | Performed by: INTERNAL MEDICINE

## 2025-04-22 RX ORDER — SODIUM CHLORIDE 9 MG/ML
INJECTION, SOLUTION INTRAVENOUS CONTINUOUS PRN
Status: DISCONTINUED | OUTPATIENT
Start: 2025-04-22 | End: 2025-04-22

## 2025-04-22 RX ORDER — PROPOFOL 10 MG/ML
INJECTION, EMULSION INTRAVENOUS AS NEEDED
Status: DISCONTINUED | OUTPATIENT
Start: 2025-04-22 | End: 2025-04-22

## 2025-04-22 RX ORDER — LIDOCAINE HYDROCHLORIDE 20 MG/ML
INJECTION, SOLUTION EPIDURAL; INFILTRATION; INTRACAUDAL; PERINEURAL AS NEEDED
Status: DISCONTINUED | OUTPATIENT
Start: 2025-04-22 | End: 2025-04-22

## 2025-04-22 RX ORDER — FENTANYL CITRATE 50 UG/ML
INJECTION, SOLUTION INTRAMUSCULAR; INTRAVENOUS AS NEEDED
Status: DISCONTINUED | OUTPATIENT
Start: 2025-04-22 | End: 2025-04-22

## 2025-04-22 RX ORDER — QUETIAPINE FUMARATE 100 MG/1
100 TABLET, FILM COATED ORAL 3 TIMES DAILY
Qty: 90 TABLET | Refills: 0 | OUTPATIENT
Start: 2025-04-22

## 2025-04-22 RX ORDER — GLYCOPYRROLATE 0.2 MG/ML
INJECTION INTRAMUSCULAR; INTRAVENOUS AS NEEDED
Status: DISCONTINUED | OUTPATIENT
Start: 2025-04-22 | End: 2025-04-22

## 2025-04-22 RX ADMIN — LIDOCAINE HYDROCHLORIDE 100 MG: 20 INJECTION, SOLUTION EPIDURAL; INFILTRATION; INTRACAUDAL at 14:45

## 2025-04-22 RX ADMIN — PROPOFOL 80 MG: 10 INJECTION, EMULSION INTRAVENOUS at 14:45

## 2025-04-22 RX ADMIN — SODIUM CHLORIDE: 0.9 INJECTION, SOLUTION INTRAVENOUS at 14:35

## 2025-04-22 RX ADMIN — PHENYLEPHRINE HYDROCHLORIDE 100 MCG: 10 INJECTION INTRAVENOUS at 14:52

## 2025-04-22 RX ADMIN — PHENYLEPHRINE HYDROCHLORIDE 200 MCG: 10 INJECTION INTRAVENOUS at 14:59

## 2025-04-22 RX ADMIN — FENTANYL CITRATE 50 MCG: 50 INJECTION INTRAMUSCULAR; INTRAVENOUS at 14:45

## 2025-04-22 RX ADMIN — GLYCOPYRROLATE 0.1 MG: 0.2 INJECTION, SOLUTION INTRAMUSCULAR; INTRAVENOUS at 14:42

## 2025-04-22 RX ADMIN — PHENYLEPHRINE HYDROCHLORIDE 200 MCG: 10 INJECTION INTRAVENOUS at 15:06

## 2025-04-22 RX ADMIN — PROPOFOL 150 MCG/KG/MIN: 10 INJECTION, EMULSION INTRAVENOUS at 14:46

## 2025-04-22 NOTE — H&P
History and Physical - [facility]  Linette Davila 65 y.o. female MRN: [id number]    HPI: Linette Davila is a 65 y.o. year old female who presents with duodenal nodule.       Review of Systems    Historical Information   Past Medical History:   Diagnosis Date    Anxiety     Brain concussion 2015    Cervical disc disorder 1987    Depression 1987    Low back pain 2020    Pain has become progressively  worse    Lumbosacral disc disease 1987    SVT (supraventricular tachycardia) (HCC)     Varicella 1987     Past Surgical History:   Procedure Laterality Date    CARDIAC ELECTROPHYSIOLOGY PROCEDURE N/A 05/30/2023    Procedure: Cardiac eps/svt ablation;  Surgeon: Jigar Roe MD;  Location:  [facility];  Service: Cardiology    HIP SURGERY  2010    JOINT REPLACEMENT  2010    right hip     Social History   Social History     Substance and Sexual Activity   Alcohol Use Not Currently     Social History     Substance and Sexual Activity   Drug Use Not Currently    Comment: medical marijuana     Social History     Tobacco Use   Smoking Status Every Day    Current packs/day: 1.00    Average packs/day: 1 pack/day for 34.9 years (34.9 ttl pk-yrs)    Types: Cigarettes    Start date: 5/23/1990   Smokeless Tobacco Never     Family History   Problem Relation Age of Onset    Depression Mother     Anxiety disorder Mother     Colon polyps Mother     Hearing loss Mother     Irritable bowel syndrome Mother     Ulcerative colitis Mother     Alcohol abuse Father     Addiction problem Father     Depression Brother     Anxiety disorder Brother     Colon cancer Maternal Grandmother     Depression Maternal Grandmother     Mental illness Maternal Grandmother     Stomach cancer Maternal Grandmother     Cancer Maternal Grandmother     Psychiatric Illness Maternal Grandmother     Suicide Attempts Maternal Grandmother     Completed Suicide  Maternal Grandfather        Meds/Allergies     Not in a hospital  "admission.    No Known Allergies    Objective     /55   Pulse 65   Temp 98.5 °F (36.9 °C) (Tympanic)   Resp 18   Ht 5' 8\" (1.727 m)   Wt 77.1 kg (170 lb)   SpO2 93%   BMI 25.85 kg/m²       PHYSICAL EXAM    Gen: NAD  CV: RRR  CHEST: Clear  ABD: soft, NT/ND  EXT: no edema  Neuro: AAO      ASSESSMENT/PLAN:  This is a 65 y.o. year old female here for eus for duodenal nodule.     PLAN:   Procedure: eus      "

## 2025-04-22 NOTE — ANESTHESIA POSTPROCEDURE EVALUATION
Post-Op Assessment Note    CV Status:  Stable  Pain Score: 0    Pain management: adequate       Mental Status:  Awake, sleepy and arousable   Hydration Status:  Euvolemic   PONV Controlled:  Controlled   Airway Patency:  Patent     Post Op Vitals Reviewed: Yes    No anethesia notable event occurred.    Staff: CRNA           Last Filed PACU Vitals:  Vitals Value Taken Time   Temp 97.1 °F (36.2 °C) 04/22/25 1514   Pulse 63 04/22/25 1514   BP 90/52 04/22/25 1514   Resp 18 04/22/25 1514   SpO2 97 % 04/22/25 1514       Modified Lia:     Vitals Value Taken Time   Activity 2 04/22/25 1514   Respiration 2 04/22/25 1514   Circulation 2 04/22/25 1514   Consciousness 1 04/22/25 1514   Oxygen Saturation 2 04/22/25 1514     Modified Lia Score: 9

## 2025-04-22 NOTE — ANESTHESIA PREPROCEDURE EVALUATION
Procedure:  ENDOSCOPIC ULTRASOUND (UPPER)    Relevant Problems   NEURO/PSYCH   (+) JACOBY (generalized anxiety disorder)   (+) MDD (major depressive disorder), recurrent severe, without psychosis (HCC)   (+) Panic disorder without agoraphobia   (+) Social anxiety disorder        Physical Exam    Airway    Mallampati score: II  TM Distance: >3 FB  Neck ROM: full     Dental   No notable dental hx     Cardiovascular      Pulmonary      Other Findings  post-pubertal.      Anesthesia Plan  ASA Score- 2     Anesthesia Type- IV sedation with anesthesia with ASA Monitors.         Additional Monitors:     Airway Plan:            Plan Factors-Exercise tolerance (METS): >4 METS.    Chart reviewed.    Patient summary reviewed.    Patient is a current smoker.  Patient smoked on day of surgery.    Obstructive sleep apnea risk education given perioperatively.        Induction- intravenous.    Postoperative Plan-     Perioperative Resuscitation Plan - Level 1 - Full Code.       Informed Consent- Anesthetic plan and risks discussed with patient.  I personally reviewed this patient with the CRNA. Discussed and agreed on the Anesthesia Plan with the CRNA..      NPO Status:  Vitals Value Taken Time   Date of last liquid 04/22/25 04/22/25 1358   Time of last liquid 0600 04/22/25 1358   Date of last solid 04/21/25 04/22/25 1358   Time of last solid 2200 04/22/25 1358

## 2025-05-02 DIAGNOSIS — F41.1 GAD (GENERALIZED ANXIETY DISORDER): ICD-10-CM

## 2025-05-02 RX ORDER — CLONAZEPAM 1 MG/1
1 TABLET ORAL 3 TIMES DAILY
Qty: 90 TABLET | Refills: 0 | Status: SHIPPED | OUTPATIENT
Start: 2025-05-02 | End: 2025-06-01

## 2025-05-02 NOTE — TELEPHONE ENCOUNTER
Refill must be reviewed and completed by the office or provider. The refill is unable to be approved or denied by the medication management team.  Medication cannot be delegated.      [id number] 04/08/2025 04/04/2025 04/04/2025 clonazePAM (Tablet) 90.0 30 1 MG NA NABIL DEL CID [facility] Commercial Insurance 0 / 0 [place]   1 [id number] 03/09/2025 03/07/2025 03/07/2025 clonazePAM (Tablet) 90.0 30 1 MG [facility] [employer] [facility] 0 / 0 [place]   1 [id number] 02/08/2025 02/05/2025 02/05/2025 clonazePAM (Tablet) 90.0 30 1 MG NA NABIL DEL CID [facility] [employer] [facility] 0 / 0 [place]   2 [id number] 01/09/2025 01/08/2025 01/08/2025 clonazePAM (Tablet) 90.0 30 1 MG NA NABIL DEL CID

## 2025-05-02 NOTE — TELEPHONE ENCOUNTER
Reason for call:   [x] Refill   [] Prior Auth  [] Other:     Office:   [] PCP/Provider -   [x] Specialty/Provider -     Medication:   clonazePAM (KlonoPIN) 1 mg       Dose/Frequency:  Take 1 tablet (1 mg total) by mouth 3 (three) times a day     Quantity: 90    Pharmacy: [id number] - [place], [place] - [address] [place] [address] Pharmacy   Does the patient have enough for 3 days?   [x] Yes   [] No - Send as HP to POD    Mail Away Pharmacy   Does the patient have enough for 10 days?   [] Yes   [] No - Send as HP to POD

## 2025-05-13 ENCOUNTER — TELEMEDICINE (OUTPATIENT)
Dept: PSYCHIATRY | Facility: CLINIC | Age: 65
End: 2025-05-13
Payer: COMMERCIAL

## 2025-05-13 DIAGNOSIS — F41.1 GAD (GENERALIZED ANXIETY DISORDER): Primary | ICD-10-CM

## 2025-05-13 DIAGNOSIS — F33.1 MODERATE EPISODE OF RECURRENT MAJOR DEPRESSIVE DISORDER (HCC): ICD-10-CM

## 2025-05-13 PROBLEM — N95.0 POSTMENOPAUSAL BLEEDING: Status: RESOLVED | Noted: 2024-04-30 | Resolved: 2025-05-13

## 2025-05-13 PROCEDURE — 99214 OFFICE O/P EST MOD 30 MIN: CPT

## 2025-05-13 RX ORDER — GABAPENTIN 300 MG/1
300 CAPSULE ORAL 3 TIMES DAILY
Qty: 270 CAPSULE | Refills: 0 | Status: SHIPPED | OUTPATIENT
Start: 2025-05-13

## 2025-05-13 RX ORDER — QUETIAPINE FUMARATE 100 MG/1
100 TABLET, FILM COATED ORAL 3 TIMES DAILY
Qty: 270 TABLET | Refills: 0 | Status: SHIPPED | OUTPATIENT
Start: 2025-05-13

## 2025-05-13 RX ORDER — SERTRALINE HYDROCHLORIDE 100 MG/1
100 TABLET, FILM COATED ORAL DAILY
Qty: 90 TABLET | Refills: 0 | Status: SHIPPED | OUTPATIENT
Start: 2025-05-13

## 2025-05-13 NOTE — ASSESSMENT & PLAN NOTE
-Continue Gabapentin 300mg TID for anxiety, pt failed buspar in past   PARQ for neurontin including depression/suicidality, allergic reactions (SJS, angioedema, rhabdomyolysis, eosinophilia, anaphylaxis), dizziness and somnolence, diarrhea, xerostomia, headaches, drug interactions and others.       -Continue Seroquel 100 mg TID for anxiety, insomnia, tried Trazodone but did not work   -Klonopin 1 mg 3 times daily for severe anxiety and panic- on this medication for over 20 years, using appropriately, provider recommends decreasing usage when possible.  Discussed with patient the risks of sedation, respiratory depression, impairment of ability to drive and potential for abuse and addiction related to treatment with benzodiazepine medications. The patient understands risk of treatment with benzodiazepine medications, agrees to not drive if feels impaired and agrees to take medications as prescribed. Patient was also informed of risks of being on or starting opioid medications due to drug interactions and potential for serious respiratory depression and death        Orders:  •  sertraline (ZOLOFT) 100 mg tablet; Take 1 tablet (100 mg total) by mouth daily  •  gabapentin (Neurontin) 300 mg capsule; Take 1 capsule (300 mg total) by mouth 3 (three) times a day

## 2025-05-13 NOTE — PSYCH
MEDICATION MANAGEMENT NOTE    Name: Linette Davila      : 1960      MRN: [id number]  Encounter Provider: WILL Oconnell  Encounter Date: 2025   Encounter department: [facility]    Insurance: Payor: [employer] / Plan: [employer] / Product Type: [facility] /      Reason for Visit: No chief complaint on file.  :  Assessment & Plan  JACOBY (generalized anxiety disorder)     -Continue Gabapentin 300mg TID for anxiety, pt failed buspar in past   PARQ for neurontin including depression/suicidality, allergic reactions (SJS, angioedema, rhabdomyolysis, eosinophilia, anaphylaxis), dizziness and somnolence, diarrhea, xerostomia, headaches, drug interactions and others.       -Continue Seroquel 100 mg TID for anxiety, insomnia, tried Trazodone but did not work   -Klonopin 1 mg 3 times daily for severe anxiety and panic- on this medication for over 20 years, using appropriately, provider recommends decreasing usage when possible.  Discussed with patient the risks of sedation, respiratory depression, impairment of ability to drive and potential for abuse and addiction related to treatment with benzodiazepine medications. The patient understands risk of treatment with benzodiazepine medications, agrees to not drive if feels impaired and agrees to take medications as prescribed. Patient was also informed of risks of being on or starting opioid medications due to drug interactions and potential for serious respiratory depression and death        Orders:  •  sertraline (ZOLOFT) 100 mg tablet; Take 1 tablet (100 mg total) by mouth daily  •  gabapentin (Neurontin) 300 mg capsule; Take 1 capsule (300 mg total) by mouth 3 (three) times a day    Moderate episode of recurrent major depressive disorder (HCC)   -Continue Zoloft 100mg daily   PARQ completed including serotonin syndrome, SIADH, worsening depression, suicidality, induction of flaquito, GI  upset, headaches, activation, sexual side effects, sedation, potential drug interactions, and others.      -Continue Gabapentin 300mg TID for anxiety, pt failed buspar in past   PARQ for neurontin including depression/suicidality, allergic reactions (SJS, angioedema, rhabdomyolysis, eosinophilia, anaphylaxis), dizziness and somnolence, diarrhea, xerostomia, headaches, drug interactions and others.    Orders:  •  QUEtiapine (SEROquel) 100 mg tablet; Take 1 tablet (100 mg total) by mouth 3 (three) times a day        Treatment Recommendations:    Educated about diagnosis and treatment modalities. Verbalizes understanding and agreement with the treatment plan.  Discussed self monitoring of symptoms, and symptom monitoring tools.  Discussed medications and if treatment adjustment was needed or desired.  Aware of 24 hour and weekend coverage for urgent situations accessed by calling [facility] main practice number  I am scheduling this patient out for greater than 3 months: No    Medications Risks/Benefits:      Risks, Benefits And Possible Side Effects Of Medications:    Risks, benefits, and possible side effects of medications explained to Linette and she (or legal representative) verbalizes understanding and agreement for treatment.    Controlled Medication Discussion:     Linette has been filling controlled prescriptions on time as prescribed according to [place] Prescription Drug Monitoring Program.  Discussed with Linette the risks of sedation, respiratory depression, impairment of ability to drive and potential for abuse and addiction related to treatment with benzodiazepine medications. She understands risk of treatment with benzodiazepine medications, agrees to not drive if feels impaired and agrees to take medications as prescribed.  Linette is using medication appropriately.      History of Present Illness     CC: Linette presents today for follow up on JACOBY, MDD       Linette follows up  for MDD, JACOBY with panic.  Describes a very stressful situation over the past month where her horse had a potential life-threatening condition, the vet had to come on multiple occasions to the house, reports her horse is doing better at this time but she continues to worry.  Describes how stressful and anxiety provoking the situation is, horses are her main passion in life.  Supportive counseling and reassurance provided today.  She continues to report severe debilitating anxiety 10/10 in severity which is chronic in nature.  She continues to be unable to tolerate reductions in Klonopin and continues to use appropriately with no adverse effects reported.  She previously asked to decrease her Seroquel dosage and try trazodone at bedtime, trazodone was not effective and she returned to previous Seroquel 300 mg daily which was sent back into her pharmacy today.  She denies any adverse effects from the Seroquel.  Continues to report chronic GI issues which may be from her chronic severe anxiety.  Continue to encourage coping skills, therapy, partial program if needed to treat anxiety, patient did complete partial program in the past.  Patient does have an adequate support system and her  was very helpful during the past month.  PHQ 20, severe depression related to circumstances.  She denies any thoughts of SI or HI.  Denies needing any higher level of care at this time.  She appears at baseline during today's encounter.    Med Compliance: yes    Since our last visit, overall symptoms have been unchanged.       HPI ROS:     Medication Side Effects: denies  Depression: 5 /10 (10 worst)  Anxiety: 10 /10 (10 worst)  Safety concerns (SI, HI, others): denies si and hi  Sleep: 6-7 hrs  Energy: fair  Appetite: low  Weight Change: denies    Linette denies any side effects from medications unless noted above.    Review Of Systems: A review of systems is obtained and is negative except for the pertinent positives listed in  HPI/Subjective above.      Current Rating Scores:     Current PHQ-9   PHQ-2/9 Depression Screening    Little interest or pleasure in doing things: 2 - more than half the days  Feeling down, depressed, or hopeless: 3 - nearly every day  Trouble falling or staying asleep, or sleeping too much: 3 - nearly every day  Feeling tired or having little energy: 3 - nearly every day  Poor appetite or overeatin - more than half the days  Feeling bad about yourself - or that you are a failure or have let yourself or your family down: 3 - nearly every day  Trouble concentrating on things, such as reading the newspaper or watching television: 2 - more than half the days  Moving or speaking so slowly that other people could have noticed. Or the opposite - being so fidgety or restless that you have been moving around a lot more than usual: 2 - more than half the days  Thoughts that you would be better off dead, or of hurting yourself in some way: 0 - not at all  PHQ-9 Score: 20  PHQ-9 Interpretation: Severe depression         Areas of Improvement: reviewed in HPI/Subjective Section and reviewed in Assessment and Plan Section      Past Medical History:   Diagnosis Date   • Anxiety    • Brain concussion    • Cervical disc disorder    • Depression    • Low back pain     Pain has become progressively  worse   • Lumbosacral disc disease    • SVT (supraventricular tachycardia) (HCC)    • Varicella      Past Surgical History:   Procedure Laterality Date   • CARDIAC ELECTROPHYSIOLOGY PROCEDURE N/A 2023    Procedure: Cardiac eps/svt ablation;  Surgeon: Jigar Roe MD;  Location: [facility];  Service: Cardiology   • HIP SURGERY     • JOINT REPLACEMENT      right hip     Allergies: No Known Allergies    Current Outpatient Medications   Medication Instructions   • acetaminophen (TYLENOL) 500 mg, Every 6 hours PRN   • atorvastatin (LIPITOR) 10 mg, Oral, Daily   • clonazePAM (KLONOPIN) 1 mg,  Oral, 3 times daily   • diclofenac (VOLTAREN) 75 mg, Oral, 2 times daily PRN   • dicyclomine (BENTYL) 20 mg, Oral, Every 6 hours   • gabapentin (NEURONTIN) 300 mg, Oral, 3 times daily   • omeprazole (PRILOSEC) 40 mg, Oral, 2 times daily   • QUEtiapine (SEROQUEL) 100 mg, Oral, 3 times daily   • sertraline (ZOLOFT) 100 mg, Oral, Daily        Substance Abuse History:    Tobacco, Alcohol and Drug Use History     Tobacco Use   • Smoking status: Every Day     Current packs/day: 1.00     Average packs/day: 1 pack/day for 35.0 years (35.0 ttl pk-yrs)     Types: Cigarettes     Start date: 5/23/1990   • Smokeless tobacco: Never   Vaping Use   • Vaping status: Some Days   • Substances: Nicotine, Flavoring   Substance Use Topics   • Alcohol use: Not Currently   • Drug use: Not Currently     Comment: medical marijuana          Social History:    Social History     Socioeconomic History   • Marital status: /Civil Union     Spouse name: Not on file   • Number of children: Not on file   • Years of education: Not on file   • Highest education level: Not on file   Occupational History   • Not on file   Other Topics Concern   • Not on file   Social History Narrative   • Not on file        Family Psychiatric History:     Family History   Problem Relation Age of Onset   • Depression Mother    • Anxiety disorder Mother    • Colon polyps Mother    • Hearing loss Mother    • Irritable bowel syndrome Mother    • Ulcerative colitis Mother    • Alcohol abuse Father    • Addiction problem Father    • Depression Brother    • Anxiety disorder Brother    • Colon cancer Maternal Grandmother    • Depression Maternal Grandmother    • Mental illness Maternal Grandmother    • Stomach cancer Maternal Grandmother    • Cancer Maternal Grandmother    • Psychiatric Illness Maternal Grandmother    • Suicide Attempts Maternal Grandmother    • Completed Suicide  Maternal Grandfather        Medical History Reviewed by provider this encounter:  Tobacco   Allergies  Meds  Problems  Med Hx  Surg Hx  Fam Hx          Objective   There were no vitals taken for this visit.     Mental Status Evaluation:    Appearance age appropriate, casually dressed, dressed appropriately   Behavior appears anxious   Speech normal rate, normal volume, normal pitch, spontaneous   Mood depressed, anxious   Affect normal range and intensity, appropriate   Thought Processes organized, goal directed   Thought Content no overt delusions   Perceptual Disturbances: no auditory hallucinations, no visual hallucinations   Abnormal Thoughts  Risk Potential Suicidal ideation - None  Homicidal ideation - None  Potential for aggression - No   Orientation    Memory recent and remote memory grossly intact   Consciousness alert and awake   Attention Span Concentration Span attention span and concentration appear shorter than expected for age   Intellect appears to be of average intelligence   Insight intact   Judgement intact   Muscle Strength and  Gait normal muscle strength and normal muscle tone   Motor activity no abnormal movements   Language no difficulty naming common objects, no difficulty repeating a phrase, no difficulty writing a sentence   Fund of Knowledge adequate knowledge of current events  adequate fund of knowledge regarding past history  adequate fund of knowledge regarding vocabulary        Laboratory Results: I have personally reviewed all pertinent laboratory/tests results    Recent Labs (last 2 months):   Lab on 03/14/2025   Component Date Value   • Cholesterol 03/14/2025 237 (H)    • Triglycerides 03/14/2025 210 (H)    • HDL, Direct 03/14/2025 43 (L)    • LDL Calculated 03/14/2025 152 (H)    • Non-HDL-Chol (CHOL-HDL) 03/14/2025 194    • WBC 03/14/2025 11.03 (H)    • RBC 03/14/2025 4.88    • Hemoglobin 03/14/2025 13.9    • Hematocrit 03/14/2025 44.2    • MCV 03/14/2025 91    • MCH 03/14/2025 28.5    • MCHC 03/14/2025 31.4    • RDW 03/14/2025 13.8    • MPV 03/14/2025 11.0    •  Platelets 03/14/2025 253    • nRBC 03/14/2025 0    • Segmented % 03/14/2025 66    • Immature Grans % 03/14/2025 0    • Lymphocytes % 03/14/2025 23    • Monocytes % 03/14/2025 8    • Eosinophils Relative 03/14/2025 2    • Basophils Relative 03/14/2025 1    • Absolute Neutrophils 03/14/2025 7.29    • Absolute Immature Grans 03/14/2025 0.03    • Absolute Lymphocytes 03/14/2025 2.54    • Absolute Monocytes 03/14/2025 0.93    • Eosinophils Absolute 03/14/2025 0.19    • Basophils Absolute 03/14/2025 0.05        Suicide/Homicide Risk Assessment:    Risk of Harm to Self:  Protective Factors: no current suicidal ideation, able to make plans for the future, able to manage anger well, access to mental health treatment, compliant with medications, compliant with mental health treatment, connection to community, having a desire to live, having pets, medical compliance, resiliency, responsibilities and duties to others, stable living environment, sense of determination  Based on today's assessment, Linette presents the following risk of harm to self: minimal    Risk of Harm to Others:  Based on today's assessment, Linette presents the following risk of harm to others: none    The following interventions are recommended: Continue medication management. No other intervention changes indicated at this time.    Psychotherapy Provided:     Individual psychotherapy provided: Yes    Medications, treatment progress and treatment plan reviewed with Linette.  Medication changes discussed with Linette.  Medication education provided to Linette.    Treatment Plan:    Completed and signed during the session: Not applicable - Treatment Plan not due at this session.    Goals: Progress towards Treatment Plan goals - Yes, progressing, as evidenced by subjective findings in HPI/Subjective Section and in Assessment and Plan Section    Depression Follow-up Plan Completed: No    Note Share:    This note was shared with patient.    Administrative  "Statements   Administrative Statements   Encounter provider WILL Oconnell    The Patient is located at Home and in the following state in which I hold an active license [place].    The patient was identified by name and date of birth. Linette Davila was informed that this is a telemedicine visit and that the visit is being conducted through the Epic Embedded platform. She agrees to proceed..  My office door was closed. No one else was in the room.  She acknowledged consent and understanding of privacy and security of the video platform. The patient has agreed to participate and understands they can discontinue the visit at any time.    I have spent a total time of 15 minutes in caring for this patient on the day of the visit/encounter including Risks and benefits of tx options, not including the time spent for establishing the audio/video connection.    Visit Time  Visit Start Time: 225  Visit Stop Time: 240  Total Visit Duration: 15 minutes    Portions of the record may have been created with voice recognition software. Occasional wrong word or \"sound a like\" substitutions may have occurred due to the inherent limitations of voice recognition software. Read the chart carefully and recognize, using context, where substitutions have occurred.    WILL Oconnell 05/16/25  "

## 2025-05-15 ENCOUNTER — TELEMEDICINE (OUTPATIENT)
Age: 65
End: 2025-05-15
Payer: COMMERCIAL

## 2025-05-15 DIAGNOSIS — F41.1 GAD (GENERALIZED ANXIETY DISORDER): ICD-10-CM

## 2025-05-15 DIAGNOSIS — K58.0 IRRITABLE BOWEL SYNDROME WITH DIARRHEA: Primary | ICD-10-CM

## 2025-05-15 DIAGNOSIS — R10.9 ABDOMINAL CRAMPING: ICD-10-CM

## 2025-05-15 PROCEDURE — 99213 OFFICE O/P EST LOW 20 MIN: CPT | Performed by: INTERNAL MEDICINE

## 2025-05-15 RX ORDER — DICYCLOMINE HCL 20 MG
20 TABLET ORAL EVERY 6 HOURS
Qty: 120 TABLET | Refills: 3 | Status: SHIPPED | OUTPATIENT
Start: 2025-05-15

## 2025-05-15 NOTE — PROGRESS NOTES
Virtual Regular VisitName: Linette Davila      : 1960      MRN: [id number]  Encounter Provider: Demi Verdugo MD  Encounter Date: 5/15/2025   Encounter department: [facility] [place]  :  Assessment & Plan  Irritable bowel syndrome with diarrhea  Patient most certainly has IBS.  She is reports significant anxiety that is being evaluated by psychiatrist.  She realizes and admits that her GI symptoms worsened significantly when her anxiety worsens.    I educated patient about low fiber diet.  She will look into this diet.  Increase Bentyl to 20 mg every 6 hours as needed  Start peppermint oil (IBgard)  Patient would like to hold off on Xifaxan trial but can consider in the future  Patient would like to hold off on transvaginal ultrasound  Hold off on TCA given multiple psychiatric medications         JACOBY (generalized anxiety disorder)         Abdominal cramping    Orders:    dicyclomine (BENTYL) 20 mg tablet; Take 1 tablet (20 mg total) by mouth every 6 (six) hours    JACOBY (generalized anxiety disorder)         Abdominal cramping               History of Present Illness     Patient reports lower abdominal cramping that occurs intermittently mainly with stress.  Regular bowel movements once per day however intermittently loose.  Symptoms worsened with stress.  Denies any upper GI symptoms including epigastric pain nausea vomiting.  No significant early satiety.  No GERD dysphagia odynophagia.      Review of Systems    Objective   There were no vitals taken for this visit.    Physical Exam      Physical Exam:   GENERAL: NAD  HEENT:  NC/AT, PERRL, EOMI, MMM, no scleral icterus  CARDIAC:  RRR, +S1/S2, no S3/S4 heard, no m/g/r  PULMONARY:  CTA B/L, no wheezing/rales/rhonci, non-labored breathing  ABDOMEN:  Soft, NT/ND, +BS, no rebound/guarding/rigidity  Extremities:  2+ Pulses in DP/PT. No edema, cyanosis, or clubbing  NEUROLOGIC:  Alert/oriented x3. No motor or sensory  deficits  SKIN:  No rashes or erythema    Administrative Statements   Encounter provider Demi Verdugo MD    The Patient is located at Home and in the following state in which I hold an active license [place].    The patient was identified by name and date of birth. Linette Davila was informed that this is a telemedicine visit and that the visit is being conducted through the Epic Embedded platform. She agrees to proceed..  My office door was closed. No one else was in the room.  She acknowledged consent and understanding of privacy and security of the video platform. The patient has agreed to participate and understands they can discontinue the visit at any time.    I have spent a total time of 30 minutes in caring for this patient on the day of the visit/encounter including Diagnostic results, Prognosis, Risks and benefits of tx options, Instructions for management, and Patient and family education, not including the time spent for establishing the audio/video connection.

## 2025-06-01 ENCOUNTER — OFFICE VISIT (OUTPATIENT)
Dept: URGENT CARE | Facility: CLINIC | Age: 65
End: 2025-06-01
Payer: COMMERCIAL

## 2025-06-01 VITALS
HEART RATE: 63 BPM | SYSTOLIC BLOOD PRESSURE: 120 MMHG | DIASTOLIC BLOOD PRESSURE: 60 MMHG | TEMPERATURE: 98.7 F | RESPIRATION RATE: 18 BRPM | OXYGEN SATURATION: 95 %

## 2025-06-01 DIAGNOSIS — S01.81XA LACERATION OF SKIN OF FACE, INITIAL ENCOUNTER: ICD-10-CM

## 2025-06-01 DIAGNOSIS — S01.81XA FOREHEAD LACERATION, INITIAL ENCOUNTER: Primary | ICD-10-CM

## 2025-06-01 PROCEDURE — 90715 TDAP VACCINE 7 YRS/> IM: CPT

## 2025-06-01 PROCEDURE — 99213 OFFICE O/P EST LOW 20 MIN: CPT

## 2025-06-01 PROCEDURE — 12013 RPR F/E/E/N/L/M 2.6-5.0 CM: CPT

## 2025-06-01 RX ORDER — LIDOCAINE HYDROCHLORIDE 10 MG/ML
5 INJECTION, SOLUTION EPIDURAL; INFILTRATION; INTRACAUDAL; PERINEURAL ONCE
Status: COMPLETED | OUTPATIENT
Start: 2025-06-01 | End: 2025-06-01

## 2025-06-01 RX ORDER — LIDOCAINE HYDROCHLORIDE 10 MG/ML
1 INJECTION, SOLUTION EPIDURAL; INFILTRATION; INTRACAUDAL; PERINEURAL ONCE
Status: CANCELLED | OUTPATIENT
Start: 2025-06-01 | End: 2025-06-01

## 2025-06-01 RX ADMIN — LIDOCAINE HYDROCHLORIDE 5 ML: 10 INJECTION, SOLUTION EPIDURAL; INFILTRATION; INTRACAUDAL; PERINEURAL at 10:51

## 2025-06-01 NOTE — PATIENT INSTRUCTIONS
Return in 5-7 days for suture removal to forehead.  The eye laceration the steri-strip and glue will fall off on its own  Try not to get direct water on the site  Any signs of infection follow up for antibiotic consideration and suture removal.

## 2025-06-01 NOTE — PROGRESS NOTES
[facility]    NAME: Linette Davila is a 65 y.o. female  : 1960    MRN: [id number]  DATE: 2025  TIME: 2:01 PM    Assessment and Plan   Forehead laceration, initial encounter [S01.81XA]  1. Forehead laceration, initial encounter  TDAP VACCINE GREATER THAN OR EQUAL TO 8YO IM    lidocaine (PF) (XYLOCAINE-MPF) 1 % injection 5 mL    Laceration repair    Laceration repair      2. Laceration of skin of face, initial encounter  Laceration repair    Laceration repair        5 sutures place to forehead  Closed steri-strip and wound glue to eye laceration  Lip left to heal on its own <2 cm, no gaping with movement.   Tadp given  Follow up with primary care in 3-5 days.  Go to ER if symptoms get worse.     Patient Instructions     Return in 5-7 days for suture removal to forehead.  The eye laceration the steri-strip and glue will fall off on its own  Try not to get direct water on the site  Any signs of infection follow up for antibiotic consideration and suture removal.      Chief Complaint     Chief Complaint   Patient presents with    Head Laceration     Pt fell this morning getting out of bed and hit head on night table. Sustained lac to forehead, lip and right eye. Bleeding noted. No LOC, no dizziness or vertigo prior to or after fall.          History of Present Illness       Presents following head laceration this morning.  Reports that she was getting out of bed when she fell onto the nightstand.  She has a laceration to the forehead left and right eye.  There is bleeding noted.  Denies LOC loss of consciousnes dizziness eye changes nausea vomiting vertigo before after fall.  Denies chest pain or shortness of breath.s, reports that she feels in her normal state of health.   is in room who also clarifies she has been acting normally.  Mild muscular pain to the bilateral shoulders and neck area not primary concern for patient at this time.        Review of Systems   Review of Systems    Constitutional:  Negative for fever.   Eyes:  Negative for visual disturbance.   Respiratory:  Negative for shortness of breath.    Cardiovascular:  Negative for chest pain.   Skin:  Positive for wound.   Neurological:  Negative for dizziness, syncope and light-headedness.         Current Medications     Current Medications[1]    Current Allergies     Allergies as of 06/01/2025    (No Known Allergies)            The following portions of the patient's history were reviewed and updated as appropriate: allergies, current medications, past family history, past medical history, past social history, past surgical history and problem list.     Past Medical History[2]    Past Surgical History[3]    Family History[4]      Medications have been verified.        Objective   /60   Pulse 63   Temp 98.7 °F (37.1 °C)   Resp 18   SpO2 95%        Physical Exam     Physical Exam  Vitals reviewed.   Constitutional:       Appearance: Normal appearance.     Cardiovascular:      Rate and Rhythm: Normal rate and regular rhythm.      Pulses: Normal pulses.      Heart sounds: Normal heart sounds. No murmur heard.  Pulmonary:      Effort: Pulmonary effort is normal. No respiratory distress.      Breath sounds: Normal breath sounds.     Skin:     General: Skin is warm and dry.      Capillary Refill: Capillary refill takes less than 2 seconds.      Comments: Right forehead with 2.75 cm laceration with gaping present.  Minimal bleeding present.  Laceration just lateral to the right eye area with a U-shaped laceration with a flap in the middle.  Very mild gaping present and minimal depth.  But does appear to have some ecchymosis present.  Right lower lip laceration 1.5 cm patient is moving back up and down there is no gaping noted.  All of direct pressure of the hands spreading the laceration on the lip is gaping present.  Mild bleeding to each site.  No foreign bodies appreciated any lacerations.     Neurological:      General: No  focal deficit present.      Mental Status: She is alert and oriented to person, place, and time.     Psychiatric:         Mood and Affect: Mood normal.         Behavior: Behavior normal.           Universal Protocol:  Consent: Verbal consent obtained  Consent given by: patient  Timeout called at: 6/1/2025 10:48 AM.  Patient understanding: patient states understanding of the procedure being performed  Patient identity confirmed: verbally with patient  Laceration repair    Date/Time: 6/1/2025 8:30 AM    Performed by: WILL Bianchi  Authorized by: WILL Bianchi  Body area: head/neck  Location details: forehead  Laceration length: 2.8 cm  Tendon involvement: none  Nerve involvement: none  Vascular damage: no  Anesthesia: local infiltration    Anesthesia:  Local Anesthetic: lidocaine 1% without epinephrine      Procedure Details:  Preparation: Patient was prepped and draped in the usual sterile fashion.  Irrigation solution: saline  Irrigation method: syringe  Amount of cleaning: standard  Debridement: none  Degree of undermining: none  Skin closure: Ethilon (5-0)  Number of sutures: 5  Approximation: close  Approximation difficulty: simple  Dressing: antibiotic ointment  Patient tolerance: patient tolerated the procedure well with no immediate complications        Universal Protocol:  Consent: Verbal consent obtained  Consent given by: patient  Timeout called at: 6/1/2025 10:49 AM.  Patient understanding: patient states understanding of the procedure being performed  Patient identity confirmed: verbally with patient  Laceration repair    Date/Time: 6/1/2025 8:30 AM    Performed by: WILL Bianchi  Authorized by: WILL Bianchi  Body area: head/neck  Location details: right cheek  Laceration length: 1 cm  Foreign bodies: no foreign bodies  Tendon involvement: none  Nerve involvement: none      Procedure Details:  Irrigation solution: tap water and saline  Irrigation  method: syringe  Amount of cleaning: standard  Debridement: none  Degree of undermining: none  Skin closure: Steri-Strips and glue  Approximation difficulty: simple  Patient tolerance: patient tolerated the procedure well with no immediate complications  Comments: Wound Brought together and closed with wound glue and Steri-Strips good approximation of the wound.  Gauze was placed over the eye during procedure no wound glue noted to get into the eye at this time.                          [1]   Current Outpatient Medications:     acetaminophen (TYLENOL) 500 mg tablet, Take 500 mg by mouth every 6 (six) hours as needed for mild pain, Disp: , Rfl:     atorvastatin (LIPITOR) 10 mg tablet, Take 1 tablet (10 mg total) by mouth daily, Disp: 100 tablet, Rfl: 2    clonazePAM (KlonoPIN) 1 mg tablet, Take 1 tablet (1 mg total) by mouth 3 (three) times a day, Disp: 90 tablet, Rfl: 0    diclofenac (VOLTAREN) 75 mg EC tablet, Take 1 tablet (75 mg total) by mouth 2 (two) times a day as needed (moderate pain), Disp: 60 tablet, Rfl: 0    dicyclomine (BENTYL) 20 mg tablet, Take 1 tablet (20 mg total) by mouth every 6 (six) hours, Disp: 120 tablet, Rfl: 3    gabapentin (Neurontin) 300 mg capsule, Take 1 capsule (300 mg total) by mouth 3 (three) times a day, Disp: 270 capsule, Rfl: 0    omeprazole (PriLOSEC) 40 MG capsule, Take 1 capsule (40 mg total) by mouth 2 (two) times a day, Disp: 180 capsule, Rfl: 3    QUEtiapine (SEROquel) 100 mg tablet, Take 1 tablet (100 mg total) by mouth 3 (three) times a day, Disp: 270 tablet, Rfl: 0    sertraline (ZOLOFT) 100 mg tablet, Take 1 tablet (100 mg total) by mouth daily, Disp: 90 tablet, Rfl: 0  No current facility-administered medications for this visit.  [2]   Past Medical History:  Diagnosis Date    Anxiety     Brain concussion 2015    Cervical disc disorder 1987    Depression 1987    Low back pain 2020    Pain has become progressively  worse    Lumbosacral disc disease 1987    SVT  (supraventricular tachycardia) (HCC)     Varicella 1987   [3]   Past Surgical History:  Procedure Laterality Date    CARDIAC ELECTROPHYSIOLOGY PROCEDURE N/A 05/30/2023    Procedure: Cardiac eps/svt ablation;  Surgeon: Jigar Roe MD;  Location:  CARDIAC CATH LAB;  Service: Cardiology    HIP SURGERY  2010    JOINT REPLACEMENT  2010    right hip   [4]   Family History  Problem Relation Name Age of Onset    Depression Mother Khloe     Anxiety disorder Mother Khloe     Colon polyps Mother Khloe     Hearing loss Mother Khloe     Irritable bowel syndrome Mother Khloe     Ulcerative colitis Mother Khloe     Alcohol abuse Father Grover     Addiction problem Father Grover     Depression Brother Flip     Anxiety disorder Brother Flip     Colon cancer Maternal Grandmother steven     Depression Maternal Grandmother steven     Mental illness Maternal Grandmother steven     Stomach cancer Maternal Grandmother steven     Cancer Maternal Grandmother steven     Psychiatric Illness Maternal Grandmother steven     Suicide Attempts Maternal Grandmother steven     Completed Suicide  Maternal Grandfather castillo

## 2025-06-02 DIAGNOSIS — F41.1 GAD (GENERALIZED ANXIETY DISORDER): ICD-10-CM

## 2025-06-02 NOTE — TELEPHONE ENCOUNTER
Medication Refill Request     Name of Medication clonazePAM (KlonoPIN) 1 mg tablet  Dose/Frequency     Take 1 tablet (1 mg total) by mouth 3 (three) times a day     Quantity 90  Verified pharmacy   [x]  Verified ordering Provider   [x]  Does patient have enough for the next 3 days? Yes [] No [x]  Does patient have a follow-up appointment scheduled? Yes [x] No []   If so when is appointment: 8/21/25

## 2025-06-02 NOTE — TELEPHONE ENCOUNTER
05/06/2025 05/02/2025 05/02/2025 clonazePAM (Tablet) 90.0 30 1 [facility] [place] [facility] [place] / 0 [place]   1 [id number] 04/08/2025 04/04/2025 04/04/2025 clonazePAM (Tablet) 90.0 30 1 MG NA NABIL DEL CID [facility] Commercial Insurance 0 / 0 [place]   1 [id number] 03/09/2025 03/07/2025 03/07/2025 clonazePAM (Tablet) 90.0 30 1 MG NA NABIL DEL CID [facility] Commercial Insurance 0 / 0 [place]   1 [id number] 02/08/2025 02/05/2025 02/05/2025 clonazePAM (Tablet) 90.0 30 1 MG NA NABIL DEL CID [facility] Commercial Insurance 0 / 0 [place]   2 [id number] 01/09/2025 01/08/2025 01/08/2025 clonazePAM (Tablet) 90.0 30 1 MG NA NABIL DEL CID [facility] Commercial Insurance 0 / 0 [place]   1 [id number] 12/21/2024 12/20/2024 12/20/2024 traMADol HCL 50 MG TABLET (Tablet) 20.0 10 50 MG 20.0 RENETTA ADORE [facility] [place] 0 / 0 [place]   1 [id number] 12/21/2024 12/17/2024 12/17/2024 clonazePAM (Tablet) 45.0 15 1 MG LILIANA DEL CID [facility] Commercial Insurance 0 / 0 [place]   1 [id number] 12/09/2024 12/04/2024 12/04/2024 clonazePAM (Tablet) 45.0 15 1 MG [facility]IANA [facility] [place] 0 / 0 [place]   1 [id number] 11/08/2024 11/06/2024 11/06/2024 clonazePAM (Tablet)

## 2025-06-03 RX ORDER — CLONAZEPAM 1 MG/1
1 TABLET ORAL 3 TIMES DAILY
Qty: 90 TABLET | Refills: 0 | Status: SHIPPED | OUTPATIENT
Start: 2025-06-03 | End: 2025-07-03

## 2025-06-09 ENCOUNTER — OFFICE VISIT (OUTPATIENT)
Dept: FAMILY MEDICINE CLINIC | Facility: CLINIC | Age: 65
End: 2025-06-09
Payer: COMMERCIAL

## 2025-06-09 VITALS
DIASTOLIC BLOOD PRESSURE: 68 MMHG | SYSTOLIC BLOOD PRESSURE: 106 MMHG | HEIGHT: 68 IN | HEART RATE: 100 BPM | OXYGEN SATURATION: 96 % | TEMPERATURE: 98.5 F | BODY MASS INDEX: 25.46 KG/M2 | WEIGHT: 168 LBS

## 2025-06-09 DIAGNOSIS — S01.81XD FOREHEAD LACERATION, SUBSEQUENT ENCOUNTER: Primary | ICD-10-CM

## 2025-06-09 DIAGNOSIS — M54.50 CHRONIC BILATERAL LOW BACK PAIN, UNSPECIFIED WHETHER SCIATICA PRESENT: ICD-10-CM

## 2025-06-09 DIAGNOSIS — G89.29 CHRONIC BILATERAL LOW BACK PAIN, UNSPECIFIED WHETHER SCIATICA PRESENT: ICD-10-CM

## 2025-06-09 DIAGNOSIS — Z48.02 ENCOUNTER FOR REMOVAL OF SUTURES: ICD-10-CM

## 2025-06-09 PROCEDURE — 99213 OFFICE O/P EST LOW 20 MIN: CPT

## 2025-06-09 RX ORDER — DICLOFENAC SODIUM 75 MG/1
75 TABLET, DELAYED RELEASE ORAL 2 TIMES DAILY PRN
Qty: 60 TABLET | Refills: 1 | Status: SHIPPED | OUTPATIENT
Start: 2025-06-09

## 2025-06-09 NOTE — PROGRESS NOTES
"Name: Linette Davila      : 1960      MRN: [id number]  Encounter Provider: Jason Pelletier PA-C  Encounter Date: 2025   Encounter department: [facility]    Assessment & Plan  Forehead laceration, subsequent encounter  Pt suffered forehead lac when getting out of bed , fell onto the nightstand.   Presented to   w laceration to the forehead left and right eye area  5 sutures placed  Presenting today for suture removal   No drainage or signs of infection  Dressing removed from the right ocular area lateral aspect, no active evidence of infection but continued healing wound is present, advised warm soap and water rinse       Encounter for removal of sutures              History of Present Illness     Linette Davila is a 65 y.o. female  presenting for  f/u suture removal.         **Note: Portions of the record may have been created with voice recognition software.  Occasional wrong word or \"sound alike\" substitutions may have occurred due to the inherent limitations of voice recognition software.  Please read the chart carefully and recognize, using context, where substitutions have occurred. Please contact for further clarification, when necessary.       Review of Systems   Constitutional:  Negative for chills and fever.   HENT:  Negative for ear pain and sore throat.    Eyes:  Negative for pain and visual disturbance.   Respiratory:  Negative for cough and shortness of breath.    Cardiovascular:  Negative for chest pain and palpitations.   Gastrointestinal:  Negative for abdominal pain and vomiting.   Genitourinary:  Negative for dysuria and hematuria.   Musculoskeletal:  Negative for arthralgias and back pain.   Skin:  Positive for wound. Negative for color change and rash.   Neurological:  Negative for seizures and syncope.   All other systems reviewed and are negative.    Past Medical History[1]  Past Surgical History[2]  Family History[3]  Social " History[4]  Medications[5]  No Known Allergies  Immunization History   Administered Date(s) Administered    INFLUENZA 01/15/2020, 10/18/2020    Tdap 06/01/2025     Objective   There were no vitals taken for this visit.    Physical Exam  Vitals and nursing note reviewed.   Constitutional:       General: She is not in acute distress.     Appearance: She is well-developed.   HENT:      Head: Normocephalic and atraumatic.     Eyes:      Conjunctiva/sclera: Conjunctivae normal.       Cardiovascular:      Rate and Rhythm: Normal rate and regular rhythm.      Heart sounds: No murmur heard.  Pulmonary:      Effort: Pulmonary effort is normal. No respiratory distress.      Breath sounds: Normal breath sounds.   Abdominal:      Palpations: Abdomen is soft.      Tenderness: There is no abdominal tenderness.     Musculoskeletal:         General: No swelling.      Cervical back: Neck supple.     Skin:     General: Skin is warm and dry.      Findings: Lesion (laceration to rt forehead with good healing, laceration to rt lateral occular area without eye involvement, as well as lip laceration. all without evidence of active infection) present.     Neurological:      Mental Status: She is alert and oriented to person, place, and time.     Psychiatric:         Mood and Affect: Mood normal.       Suture removal    Date/Time: 6/9/2025 1:00 PM    Performed by: Jason Pelletier PA-C  Authorized by: Jason Pelletier PA-C    Universal Protocol:  procedure performed by consultantConsent: Verbal consent obtained. Written consent not obtained  Risks and benefits: risks, benefits and alternatives were discussed  Patient understanding: patient states understanding of the procedure being performed  Patient consent: the patient's understanding of the procedure matches consent given  Patient identity confirmed: verbally with patient      Patient location:  Clinic  Location:     Location:  Head/neck    Head/neck location:  Forehead  Procedure  details:     Tools used:  Suture removal kit    Wound appearance:  No sign(s) of infection, nonpurulent, nontender and good wound healing    Number of sutures removed:  5  Post-procedure details:     Post-removal:  No dressing applied    Patient tolerance of procedure:  Tolerated well, no immediate complications  Comments:      Follow-up as needed for drainage or signs of infection              [1]   Past Medical History:  Diagnosis Date    Anxiety     Brain concussion 2015    Cervical disc disorder 1987    Depression 1987    Low back pain 2020    Pain has become progressively  worse    Lumbosacral disc disease 1987    SVT (supraventricular tachycardia) (HCC)     Varicella 1987   [2]   Past Surgical History:  Procedure Laterality Date    CARDIAC ELECTROPHYSIOLOGY PROCEDURE N/A 05/30/2023    Procedure: Cardiac eps/svt ablation;  Surgeon: Jigar Roe MD;  Location: [facility];  Service: Cardiology    HIP SURGERY  2010    JOINT REPLACEMENT  2010    right hip   [3]   Family History  Problem Relation Name Age of Onset    Depression Mother Khloe     Anxiety disorder Mother Khloe     Colon polyps Mother Khloe     Hearing loss Mother Khloe     Irritable bowel syndrome Mother Khloe     Ulcerative colitis Mother Khloe     Alcohol abuse Father Grover     Addiction problem Father Grover     Depression Brother Flip     Anxiety disorder Brother Flip     Colon cancer Maternal Grandmother steven     Depression Maternal Grandmother steven     Mental illness Maternal Grandmother steven     Stomach cancer Maternal Grandmother steven     Cancer Maternal Grandmother steven     Psychiatric Illness Maternal Grandmother steven     Suicide Attempts Maternal Grandmother steven     Completed Suicide  Maternal Grandfather castillo    [4]   Social History  Tobacco Use    Smoking status: Every Day     Current packs/day: 1.00     Average packs/day: 1 pack/day for 35.0 years (35.0 ttl pk-yrs)     Types: Cigarettes     Start date: 5/23/1990     Passive  exposure: Current    Smokeless tobacco: Never   Vaping Use    Vaping status: Some Days    Substances: Nicotine, Flavoring   Substance and Sexual Activity    Alcohol use: Not Currently    Drug use: Not Currently     Comment: medical marijuana    Sexual activity: Not Currently     Partners: Female     Birth control/protection: Abstinence   [5]   Current Outpatient Medications on File Prior to Visit   Medication Sig    acetaminophen (TYLENOL) 500 mg tablet Take 500 mg by mouth every 6 (six) hours as needed for mild pain    atorvastatin (LIPITOR) 10 mg tablet Take 1 tablet (10 mg total) by mouth daily    clonazePAM (KlonoPIN) 1 mg tablet Take 1 tablet (1 mg total) by mouth 3 (three) times a day    diclofenac (VOLTAREN) 75 mg EC tablet Take 1 tablet (75 mg total) by mouth 2 (two) times a day as needed (moderate pain)    dicyclomine (BENTYL) 20 mg tablet Take 1 tablet (20 mg total) by mouth every 6 (six) hours    gabapentin (Neurontin) 300 mg capsule Take 1 capsule (300 mg total) by mouth 3 (three) times a day    omeprazole (PriLOSEC) 40 MG capsule Take 1 capsule (40 mg total) by mouth 2 (two) times a day    QUEtiapine (SEROquel) 100 mg tablet Take 1 tablet (100 mg total) by mouth 3 (three) times a day    sertraline (ZOLOFT) 100 mg tablet Take 1 tablet (100 mg total) by mouth daily

## 2025-06-30 DIAGNOSIS — F41.1 GAD (GENERALIZED ANXIETY DISORDER): ICD-10-CM

## 2025-06-30 NOTE — TELEPHONE ENCOUNTER
Reason for call:   [x] Refill   [] Prior Auth  [] Other:     Office:   [] PCP/Provider -   [x] Specialty/Provider - Psychiatry     Medication: clonazePAM (KlonoPIN) 1 mg tablet     Dose/Frequency: 1 mg, Oral, 3 times daily     Quantity: 90    Pharmacy: [id number] - [place], [place] - [address] [place] [address] Pharmacy   Does the patient have enough for 3 days?   [x] Yes   [] No - Send as HP to POD    Mail Away Pharmacy   Does the patient have enough for 10 days?   [] Yes   [] No - Send as HP to POD

## 2025-07-01 NOTE — TELEPHONE ENCOUNTER
Refill cannot be delegated    1 [id number] 06/04/2025 06/03/2025 06/03/2025 clonazePAM (Tablet) 90.0 30 1 MG NA NABIL [facility] [place] Insurance 0 / 0 [place]   1 [id number] 05/06/2025 05/02/2025 05/02/2025 clonazePAM (Tablet) 90.0 30 1 MG NA NABIL [facility] Commercial Insurance 0 / 0 [place]   1 [id number] 04/08/2025 04/04/2025 04/04/2025 clonazePAM (Tablet) 90.0 30 1 MG NA NABIL [facility] Commercial Insurance 0 / 0 [place]   1 [id number] 03/09/2025 03/07/2025 03/07/2025 clonazePAM (Tablet) 90.0 30 1 MG LILIANA [facility] Commercial Insurance 0 / 0 [place]   1 [id number] 02/08/2025 02/05/2025 02/05/2025 clonazePAM (Tablet) 90.0 30 1 MG NA NABIL [facility] Commercial Insurance 0 / 0 [place]   2 [id number] 01/09/2025 01/08/2025 01/08/2025 clonazePAM (Tablet) 90.0 30 1 MG [facility]IANA [facility] Commercial Insurance 0 / 0 [place]   1 [id number] 12/21/2024 12/20/2024 12/20/2024 traMADol HCL 50 MG TABLET (Tablet) 20.0 10 50 MG 20.0 RENETTA [facility] Commercial Insurance 0 / 0 [place]   1 [id number] 12/21/2024 12/17/2024 12/17/2024 clonazePAM (Tablet) 45.0 15 1 MG NA NABIL [facility] Commercial Insurance 0 / 0 [place]   1 [id number] 12/09/2024 12/04/2024 12/04/2024 clonazePAM (Tablet) 45.0 15 1 MG NA [facility] Commercial Insurance 0 / 0 [place]   1 [id number] 11/08/2024 11/06/2024 11/06/2024 clonazePAM (Tablet) 90.0 30 1 MG NA NABIL [facility] Commercial Insurance 0 / 0 [place]

## 2025-07-02 RX ORDER — CLONAZEPAM 1 MG/1
1 TABLET ORAL 3 TIMES DAILY
Qty: 90 TABLET | Refills: 0 | Status: SHIPPED | OUTPATIENT
Start: 2025-07-02 | End: 2025-08-01

## 2025-07-30 DIAGNOSIS — F41.1 GAD (GENERALIZED ANXIETY DISORDER): ICD-10-CM

## 2025-07-31 RX ORDER — CLONAZEPAM 1 MG/1
1 TABLET ORAL 3 TIMES DAILY
Qty: 90 TABLET | Refills: 0 | Status: SHIPPED | OUTPATIENT
Start: 2025-07-31 | End: 2025-08-30

## 2025-08-19 ENCOUNTER — TELEPHONE (OUTPATIENT)
Dept: FAMILY MEDICINE CLINIC | Facility: CLINIC | Age: 65
End: 2025-08-19

## (undated) DEVICE — Device: Brand: WEBSTER CS

## (undated) DEVICE — PINNACLE INTRODUCER SHEATH: Brand: PINNACLE

## (undated) DEVICE — Device: Brand: REFERENCE PATCH CARTO 3

## (undated) DEVICE — CATH EP 5FR QUAD SUPREME CRD

## (undated) DEVICE — GUIDE SHEATH SRO 8.5 FR